# Patient Record
Sex: FEMALE | Race: WHITE | NOT HISPANIC OR LATINO | Employment: OTHER | URBAN - METROPOLITAN AREA
[De-identification: names, ages, dates, MRNs, and addresses within clinical notes are randomized per-mention and may not be internally consistent; named-entity substitution may affect disease eponyms.]

---

## 2017-11-27 ENCOUNTER — CONVERSION ENCOUNTER (OUTPATIENT)
Dept: MAMMOGRAPHY | Facility: HOSPITAL | Age: 42
End: 2017-11-27

## 2018-02-12 ENCOUNTER — OFFICE VISIT CONVERTED (OUTPATIENT)
Dept: FAMILY MEDICINE CLINIC | Facility: CLINIC | Age: 43
End: 2018-02-12
Attending: NURSE PRACTITIONER

## 2018-03-20 ENCOUNTER — OFFICE VISIT CONVERTED (OUTPATIENT)
Dept: FAMILY MEDICINE CLINIC | Facility: CLINIC | Age: 43
End: 2018-03-20
Attending: NURSE PRACTITIONER

## 2018-10-23 ENCOUNTER — OFFICE VISIT CONVERTED (OUTPATIENT)
Dept: FAMILY MEDICINE CLINIC | Facility: CLINIC | Age: 43
End: 2018-10-23
Attending: NURSE PRACTITIONER

## 2018-10-23 ENCOUNTER — CONVERSION ENCOUNTER (OUTPATIENT)
Dept: FAMILY MEDICINE CLINIC | Facility: CLINIC | Age: 43
End: 2018-10-23

## 2018-11-28 ENCOUNTER — CONVERSION ENCOUNTER (OUTPATIENT)
Dept: FAMILY MEDICINE CLINIC | Facility: CLINIC | Age: 43
End: 2018-11-28

## 2018-11-28 ENCOUNTER — OFFICE VISIT CONVERTED (OUTPATIENT)
Dept: FAMILY MEDICINE CLINIC | Facility: CLINIC | Age: 43
End: 2018-11-28
Attending: NURSE PRACTITIONER

## 2019-01-09 ENCOUNTER — CONVERSION ENCOUNTER (OUTPATIENT)
Dept: FAMILY MEDICINE CLINIC | Facility: CLINIC | Age: 44
End: 2019-01-09

## 2019-01-09 ENCOUNTER — OFFICE VISIT CONVERTED (OUTPATIENT)
Dept: FAMILY MEDICINE CLINIC | Facility: CLINIC | Age: 44
End: 2019-01-09
Attending: NURSE PRACTITIONER

## 2019-03-27 ENCOUNTER — OFFICE VISIT CONVERTED (OUTPATIENT)
Dept: FAMILY MEDICINE CLINIC | Facility: CLINIC | Age: 44
End: 2019-03-27
Attending: NURSE PRACTITIONER

## 2019-03-27 ENCOUNTER — CONVERSION ENCOUNTER (OUTPATIENT)
Dept: FAMILY MEDICINE CLINIC | Facility: CLINIC | Age: 44
End: 2019-03-27

## 2019-04-30 ENCOUNTER — OFFICE VISIT CONVERTED (OUTPATIENT)
Dept: FAMILY MEDICINE CLINIC | Facility: CLINIC | Age: 44
End: 2019-04-30
Attending: NURSE PRACTITIONER

## 2019-06-10 ENCOUNTER — OFFICE VISIT CONVERTED (OUTPATIENT)
Dept: FAMILY MEDICINE CLINIC | Facility: CLINIC | Age: 44
End: 2019-06-10
Attending: NURSE PRACTITIONER

## 2019-07-31 ENCOUNTER — OFFICE VISIT CONVERTED (OUTPATIENT)
Dept: FAMILY MEDICINE CLINIC | Facility: CLINIC | Age: 44
End: 2019-07-31
Attending: NURSE PRACTITIONER

## 2019-07-31 ENCOUNTER — HOSPITAL ENCOUNTER (OUTPATIENT)
Dept: ULTRASOUND IMAGING | Facility: HOSPITAL | Age: 44
Discharge: HOME | End: 2019-07-31
Attending: NURSE PRACTITIONER

## 2019-07-31 LAB
ALBUMIN SERPL-MCNC: 4.8 G/DL (ref 3.5–5)
ALBUMIN/GLOB SERPL: 1.5 {RATIO} (ref 1.4–2.6)
ALP SERPL-CCNC: 62 U/L (ref 42–98)
ALT SERPL-CCNC: 13 U/L (ref 10–40)
AMYLASE SERPL-CCNC: 52 U/L (ref 30–110)
ANION GAP SERPL CALC-SCNC: 17 MMOL/L (ref 8–19)
AST SERPL-CCNC: 18 U/L (ref 15–50)
BASOPHILS # BLD AUTO: 0.08 10*3/UL (ref 0–0.2)
BASOPHILS NFR BLD AUTO: 0.9 % (ref 0–3)
BILIRUB SERPL-MCNC: 0.43 MG/DL (ref 0.2–1.3)
BUN SERPL-MCNC: 8 MG/DL (ref 5–25)
BUN/CREAT SERPL: 9 {RATIO} (ref 6–20)
CALCIUM SERPL-MCNC: 9.7 MG/DL (ref 8.7–10.4)
CHLORIDE SERPL-SCNC: 104 MMOL/L (ref 99–111)
CONV ABS IMM GRAN: 0.03 10*3/UL (ref 0–0.2)
CONV CO2: 25 MMOL/L (ref 22–32)
CONV IMMATURE GRAN: 0.3 % (ref 0–1.8)
CONV TOTAL PROTEIN: 7.9 G/DL (ref 6.3–8.2)
CREAT UR-MCNC: 0.91 MG/DL (ref 0.5–0.9)
DEPRECATED RDW RBC AUTO: 47.1 FL (ref 36.4–46.3)
EOSINOPHIL # BLD AUTO: 0.32 10*3/UL (ref 0–0.7)
EOSINOPHIL # BLD AUTO: 3.5 % (ref 0–7)
ERYTHROCYTE [DISTWIDTH] IN BLOOD BY AUTOMATED COUNT: 14.2 % (ref 11.7–14.4)
GFR SERPLBLD BASED ON 1.73 SQ M-ARVRAT: >60 ML/MIN/{1.73_M2}
GLOBULIN UR ELPH-MCNC: 3.1 G/DL (ref 2–3.5)
GLUCOSE SERPL-MCNC: 83 MG/DL (ref 65–99)
HBA1C MFR BLD: 13.7 G/DL (ref 12–16)
HCT VFR BLD AUTO: 40.6 % (ref 37–47)
LIPASE SERPL-CCNC: 23 U/L (ref 5–51)
LYMPHOCYTES # BLD AUTO: 2.79 10*3/UL (ref 1–5)
MCH RBC QN AUTO: 30.4 PG (ref 27–31)
MCHC RBC AUTO-ENTMCNC: 33.7 G/DL (ref 33–37)
MCV RBC AUTO: 90.2 FL (ref 81–99)
MONOCYTES # BLD AUTO: 0.52 10*3/UL (ref 0.2–1.2)
MONOCYTES NFR BLD AUTO: 5.7 % (ref 3–10)
NEUTROPHILS # BLD AUTO: 5.43 10*3/UL (ref 2–8)
NEUTROPHILS NFR BLD AUTO: 59.2 % (ref 30–85)
NRBC CBCN: 0 % (ref 0–0.7)
OSMOLALITY SERPL CALC.SUM OF ELEC: 293 MOSM/KG (ref 273–304)
PLATELET # BLD AUTO: 355 10*3/UL (ref 130–400)
PMV BLD AUTO: 10.3 FL (ref 9.4–12.3)
POTASSIUM SERPL-SCNC: 3.4 MMOL/L (ref 3.5–5.3)
RBC # BLD AUTO: 4.5 10*6/UL (ref 4.2–5.4)
SODIUM SERPL-SCNC: 143 MMOL/L (ref 135–147)
VARIANT LYMPHS NFR BLD MANUAL: 30.4 % (ref 20–45)
WBC # BLD AUTO: 9.17 10*3/UL (ref 4.8–10.8)

## 2019-08-01 LAB
CONV HEPATITIS B SURFACE AG W CONFIRMATION RE: NEGATIVE
HAV IGM SERPL QL IA: NEGATIVE
HBV CORE IGM SERPL QL IA: NEGATIVE
HCV AB SER DONR QL: 0.2 S/CO RATIO (ref 0–0.9)

## 2019-09-18 ENCOUNTER — OFFICE VISIT CONVERTED (OUTPATIENT)
Dept: FAMILY MEDICINE CLINIC | Facility: CLINIC | Age: 44
End: 2019-09-18
Attending: NURSE PRACTITIONER

## 2019-10-25 ENCOUNTER — OFFICE VISIT CONVERTED (OUTPATIENT)
Dept: FAMILY MEDICINE CLINIC | Facility: CLINIC | Age: 44
End: 2019-10-25
Attending: NURSE PRACTITIONER

## 2019-11-27 ENCOUNTER — OFFICE VISIT CONVERTED (OUTPATIENT)
Dept: FAMILY MEDICINE CLINIC | Facility: CLINIC | Age: 44
End: 2019-11-27
Attending: NURSE PRACTITIONER

## 2019-12-04 ENCOUNTER — HOSPITAL ENCOUNTER (OUTPATIENT)
Dept: MAMMOGRAPHY | Facility: HOSPITAL | Age: 44
Discharge: HOME | End: 2019-12-04
Attending: OBSTETRICS & GYNECOLOGY

## 2020-01-10 ENCOUNTER — OFFICE VISIT CONVERTED (OUTPATIENT)
Dept: FAMILY MEDICINE CLINIC | Facility: CLINIC | Age: 45
End: 2020-01-10
Attending: NURSE PRACTITIONER

## 2020-01-16 ENCOUNTER — HOSPITAL ENCOUNTER (OUTPATIENT)
Dept: GENERAL RADIOLOGY | Facility: HOSPITAL | Age: 45
Discharge: HOME | End: 2020-01-16
Attending: NURSE PRACTITIONER

## 2020-03-18 ENCOUNTER — CONVERSION ENCOUNTER (OUTPATIENT)
Dept: FAMILY MEDICINE CLINIC | Facility: CLINIC | Age: 45
End: 2020-03-18

## 2020-03-18 ENCOUNTER — OFFICE VISIT CONVERTED (OUTPATIENT)
Dept: FAMILY MEDICINE CLINIC | Facility: CLINIC | Age: 45
End: 2020-03-18
Attending: NURSE PRACTITIONER

## 2020-03-19 ENCOUNTER — HOSPITAL ENCOUNTER (OUTPATIENT)
Dept: CT IMAGING | Facility: HOSPITAL | Age: 45
Discharge: HOME | End: 2020-03-19
Attending: NURSE PRACTITIONER

## 2020-04-24 ENCOUNTER — TELEMEDICINE CONVERTED (OUTPATIENT)
Dept: FAMILY MEDICINE CLINIC | Facility: CLINIC | Age: 45
End: 2020-04-24
Attending: NURSE PRACTITIONER

## 2020-09-16 ENCOUNTER — OFFICE VISIT CONVERTED (OUTPATIENT)
Dept: FAMILY MEDICINE CLINIC | Facility: CLINIC | Age: 45
End: 2020-09-16
Attending: NURSE PRACTITIONER

## 2020-11-05 ENCOUNTER — OFFICE VISIT CONVERTED (OUTPATIENT)
Dept: FAMILY MEDICINE CLINIC | Facility: CLINIC | Age: 45
End: 2020-11-05
Attending: NURSE PRACTITIONER

## 2020-12-07 ENCOUNTER — OFFICE VISIT CONVERTED (OUTPATIENT)
Dept: GASTROENTEROLOGY | Facility: CLINIC | Age: 45
End: 2020-12-07
Attending: NURSE PRACTITIONER

## 2020-12-14 ENCOUNTER — CONVERSION ENCOUNTER (OUTPATIENT)
Dept: FAMILY MEDICINE CLINIC | Facility: CLINIC | Age: 45
End: 2020-12-14

## 2020-12-14 ENCOUNTER — OFFICE VISIT CONVERTED (OUTPATIENT)
Dept: FAMILY MEDICINE CLINIC | Facility: CLINIC | Age: 45
End: 2020-12-14
Attending: NURSE PRACTITIONER

## 2021-01-15 ENCOUNTER — HOSPITAL ENCOUNTER (OUTPATIENT)
Dept: LAB | Facility: HOSPITAL | Age: 46
Discharge: HOME | End: 2021-01-15
Attending: NURSE PRACTITIONER

## 2021-01-15 ENCOUNTER — OFFICE VISIT CONVERTED (OUTPATIENT)
Dept: FAMILY MEDICINE CLINIC | Facility: CLINIC | Age: 46
End: 2021-01-15
Attending: NURSE PRACTITIONER

## 2021-01-15 ENCOUNTER — CONVERSION ENCOUNTER (OUTPATIENT)
Dept: FAMILY MEDICINE CLINIC | Facility: CLINIC | Age: 46
End: 2021-01-15

## 2021-01-15 LAB
ALBUMIN SERPL-MCNC: 4.4 G/DL (ref 3.5–5)
ALBUMIN/GLOB SERPL: 1.5 {RATIO} (ref 1.4–2.6)
ALP SERPL-CCNC: 60 U/L (ref 42–98)
ALT SERPL-CCNC: 16 U/L (ref 10–40)
ANION GAP SERPL CALC-SCNC: 16 MMOL/L (ref 8–19)
AST SERPL-CCNC: 21 U/L (ref 15–50)
BASOPHILS # BLD AUTO: 0.06 10*3/UL (ref 0–0.2)
BASOPHILS NFR BLD AUTO: 0.8 % (ref 0–3)
BILIRUB SERPL-MCNC: 0.33 MG/DL (ref 0.2–1.3)
BUN SERPL-MCNC: 11 MG/DL (ref 5–25)
BUN/CREAT SERPL: 10 {RATIO} (ref 6–20)
CALCIUM SERPL-MCNC: 9.6 MG/DL (ref 8.7–10.4)
CHLORIDE SERPL-SCNC: 104 MMOL/L (ref 99–111)
CHOLEST SERPL-MCNC: 164 MG/DL (ref 107–200)
CHOLEST/HDLC SERPL: 2.9 {RATIO} (ref 3–6)
CONV ABS IMM GRAN: 0.03 10*3/UL (ref 0–0.2)
CONV CO2: 23 MMOL/L (ref 22–32)
CONV IMMATURE GRAN: 0.4 % (ref 0–1.8)
CONV TOTAL PROTEIN: 7.3 G/DL (ref 6.3–8.2)
CREAT UR-MCNC: 1.05 MG/DL (ref 0.5–0.9)
DEPRECATED RDW RBC AUTO: 43.6 FL (ref 36.4–46.3)
EOSINOPHIL # BLD AUTO: 0.27 10*3/UL (ref 0–0.7)
EOSINOPHIL # BLD AUTO: 3.8 % (ref 0–7)
ERYTHROCYTE [DISTWIDTH] IN BLOOD BY AUTOMATED COUNT: 13.2 % (ref 11.7–14.4)
GFR SERPLBLD BASED ON 1.73 SQ M-ARVRAT: >60 ML/MIN/{1.73_M2}
GLOBULIN UR ELPH-MCNC: 2.9 G/DL (ref 2–3.5)
GLUCOSE SERPL-MCNC: 102 MG/DL (ref 65–99)
HCT VFR BLD AUTO: 40.4 % (ref 37–47)
HDLC SERPL-MCNC: 56 MG/DL (ref 40–60)
HGB BLD-MCNC: 13.3 G/DL (ref 12–16)
LDLC SERPL CALC-MCNC: 90 MG/DL (ref 70–100)
LYMPHOCYTES # BLD AUTO: 2.19 10*3/UL (ref 1–5)
LYMPHOCYTES NFR BLD AUTO: 30.4 % (ref 20–45)
MCH RBC QN AUTO: 29.7 PG (ref 27–31)
MCHC RBC AUTO-ENTMCNC: 32.9 G/DL (ref 33–37)
MCV RBC AUTO: 90.2 FL (ref 81–99)
MONOCYTES # BLD AUTO: 0.54 10*3/UL (ref 0.2–1.2)
MONOCYTES NFR BLD AUTO: 7.5 % (ref 3–10)
NEUTROPHILS # BLD AUTO: 4.11 10*3/UL (ref 2–8)
NEUTROPHILS NFR BLD AUTO: 57.1 % (ref 30–85)
NRBC CBCN: 0 % (ref 0–0.7)
OSMOLALITY SERPL CALC.SUM OF ELEC: 288 MOSM/KG (ref 273–304)
PLATELET # BLD AUTO: 379 10*3/UL (ref 130–400)
PMV BLD AUTO: 10.5 FL (ref 9.4–12.3)
POTASSIUM SERPL-SCNC: 3.5 MMOL/L (ref 3.5–5.3)
RBC # BLD AUTO: 4.48 10*6/UL (ref 4.2–5.4)
SODIUM SERPL-SCNC: 139 MMOL/L (ref 135–147)
TRIGL SERPL-MCNC: 90 MG/DL (ref 40–150)
VLDLC SERPL-MCNC: 18 MG/DL (ref 5–37)
WBC # BLD AUTO: 7.2 10*3/UL (ref 4.8–10.8)

## 2021-02-19 ENCOUNTER — TELEMEDICINE CONVERTED (OUTPATIENT)
Dept: FAMILY MEDICINE CLINIC | Facility: CLINIC | Age: 46
End: 2021-02-19
Attending: NURSE PRACTITIONER

## 2021-04-16 ENCOUNTER — CONVERSION ENCOUNTER (OUTPATIENT)
Dept: FAMILY MEDICINE CLINIC | Facility: CLINIC | Age: 46
End: 2021-04-16

## 2021-04-16 ENCOUNTER — OFFICE VISIT CONVERTED (OUTPATIENT)
Dept: FAMILY MEDICINE CLINIC | Facility: CLINIC | Age: 46
End: 2021-04-16
Attending: NURSE PRACTITIONER

## 2021-04-16 ENCOUNTER — HOSPITAL ENCOUNTER (OUTPATIENT)
Dept: FAMILY MEDICINE CLINIC | Facility: CLINIC | Age: 46
Discharge: HOME | End: 2021-04-16
Attending: NURSE PRACTITIONER

## 2021-04-16 ENCOUNTER — HOSPITAL ENCOUNTER (OUTPATIENT)
Dept: GENERAL RADIOLOGY | Facility: HOSPITAL | Age: 46
Discharge: HOME | End: 2021-04-16
Attending: NURSE PRACTITIONER

## 2021-04-16 LAB
BILIRUB UR QL STRIP: NEGATIVE
COLOR UR: YELLOW
CONV CLARITY OF URINE: NORMAL
CONV PROTEIN IN URINE BY AUTOMATED TEST STRIP: NEGATIVE
CONV UROBILINOGEN IN URINE BY AUTOMATED TEST STRIP: NORMAL
GLUCOSE UR QL: NEGATIVE
HGB UR QL STRIP: NORMAL
KETONES UR QL STRIP: NEGATIVE
LEUKOCYTE ESTERASE UR QL STRIP: NEGATIVE
NITRITE UR QL STRIP: NEGATIVE
PH UR STRIP.AUTO: 6 [PH]
SP GR UR: 1.02

## 2021-04-18 LAB — BACTERIA UR CULT: NORMAL

## 2021-05-10 NOTE — H&P
"   History and Physical      Patient Name: Angelika Shukla   Patient ID: [id number]   Sex: Female   YOB: 1975    Primary Care Provider: Jesse HOWELL   Referring Provider: Jesse HOWELL    Visit Date: December 7, 2020    Provider: LYNDA Madera   Location: [facility]   Location Address: [address] [place] [address]  [id number]   Location Phone: [phone]          Chief Complaint  · \"Abdominal pain\"      History Of Present Illness  The patient is a 45 year old /White female, who presents on referral from Jesse HOWELL, for a gastroenterology evaluation for abdominal pain.   The pain is improved with antibiotics and is worse with nothing. The pain is not reproducible with palpation over the area.   The patient admits to constipation. When she is constipated, the stools are hard and difficult to pass. The patient will have one bowel movement every 5 days and pain does not occur with their bowel movements.      The patient had an ER visit last week for abdominal pain.  CT abdomen/pelvis 12/01/2020 revealed acute diverticulitis in the distal colon, centered at the junction of the distal descending and proximal sigmoid colon.  No colonic obstruction, diverticular abscess, and pneumoperitoneum seen.  She was given a prescription of Cipro and Flagyl.  She reports that she does not feel she is recovering as well as she did earlier this year with a bout of diverticulitis.  This is her second episode of diverticulitis this year.  She reports that meat tends to trigger her diverticulitis.  She reports she had a colonoscopy in 2010 by Dr. Moon in which an ulcer in the rectum was discovered.  She was referred to Dr. Scherer, who treated her for Crohn's.  She was taking Apriso 4 tablets a day for 3 years.  She reports she is not taking any medications for inflammatory bowel disease since then.  She is taking Amitiza 24 mcg twice daily and Linzess 290 mcg " "daily for constipation.  With those medications, she will have a bowel movement 2 times per day.  Without those medications, she will have a bowel movement every 4 to 5 days.  She last saw Dr. Scherer approximately in April via telehealth.  She is taking probiotic and occasionally takes Benefiber.  There is no known family history of colon cancer in her family.  The patient complains of thrush, and the patient has white patches all over her tongue.    Labs 12/09/2020: Hemoglobin 13.8, hematocrit 40.8, platelets 364, ALT 15, AST 18, alk phos 61, total bili 0.24, creatinine 1, GFR greater than 60           Past Medical History  Allergies; Asthma; Bowel Disease; Broken Bones; Crohn's Disease; Diverticulitis; Labral tear of shoulder, degenerative, left, initial encounter; Medial meniscus tear, right, subsequent encounter; Migraines; Pain: Knee; Patellar instability of right knee; Screening Mammogram; Shoulder Impingement; Shoulder pain, left; Stomach Disorder; Tendonitis: Shoulder; Ulcer         Past Surgical History  Colonoscopy; Hysterectomy; Knee repair; Reconstruction of both breasts with insertion of breast implants; shoulder repair         Medication List  Amitiza 24 mcg oral capsule; buspirone 15 mg oral tablet; NEXIUM 40MG CAPS 40 Capsule; Novofine 32 32 gauge x 1/4\" miscellaneous needle; Paxil 20 mg oral tablet; Saxenda 3 mg/0.5 mL (18 mg/3 mL) subcutaneous pen injector; Suprep Bowel Prep Kit 17.5-3.13-1.6 gram oral recon soln; Topamax 50 mg oral tablet         Allergy List  All Mycins; erythromycin; Norco       Allergies Reconciled  Family Medical History  Breast Neoplasm, Malignant; Stroke; Heart Disease; Chronic Obstructive Pulmonary Disease         Social History  Alcohol (Never); Claustophobic (Unknown); .; lives with children; 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); Teacher; 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); Working         Immunizations  Name Date Admin   Influenza Refused   Influenza Refused   Influenza Refused " "  Influenza Refused         Review of Systems  · Constitutional  o Denies  o : chills, fever  · Eyes  o Denies  o : blurred vision, changes in vision  · Cardiovascular  o Denies  o : chest pain  · Respiratory  o Denies  o : shortness of breath  · Gastrointestinal  o Denies  o : nausea, vomiting  · Genitourinary  o Denies  o : dysuria, blood in urine  · Integument  o Denies  o : rash  · Neurologic  o Denies  o : tingling or numbness  · Musculoskeletal  o Denies  o : joint pain  · Endocrine  o Denies  o : weight gain, weight loss  · Psychiatric  o Denies  o : anxiety, depression      Vitals  Date Time BP Position Site L\R Cuff Size HR RR TEMP (F) WT  HT  BMI kg/m2 BSA m2 O2 Sat FR L/min FiO2 HC       12/07/2020 09:41 /83 Sitting    75 - R 16  187lbs 0oz 5'  5\" 31.12 1.97 99 %            Physical Examination  · Constitutional  o Appearance  o : Well-nourished, well developed, alert, in no acute distress, alert and oriented X 3.  · Eyes  o Vision  o :   § Visual Fields  § : move symmetrical in all directions  o Sclerae  o : sclerae anicteric  o Pupils and Irises  o : pupils equal and symetrical  · Neck  o Inspection/Palpation  o : Trachea is midline, no adenopathy  o Thyroid  o : Thyroid is not enlarged  · Respiratory  o Respiratory Effort  o : Breathing is unlabored.  o Inspection of Chest  o : normal appearance, no retractions  o Auscultation of Lungs  o : Chest is clear to auscultation bilaterally  · Cardiovascular  o Heart  o :   § Auscultation of Heart  § : no murmurs, rubs, or gallops  · Gastrointestinal  o Abdominal Examination  o : Abdomen is soft, nontender to palpation, with normal active bowel sounds, no appreciable hepatosplenomegaly.  · Genitourinary  o Digital Rectal Examination  o : Deferred  · Skin and Subcutaneous Tissue  o General Inspection  o : Skin is without focal lesions. Skin turgor is normal.  · Psychiatric  o Mood and Affect  o : Mood and affect are appropriate to " circumstances.          Assessment  · Constipation     564.00/K59.00  · Acute diverticulitis     562.11/K57.92  · Thrush, oral     112.0/B37.0      Plan  · Orders  o Flexible Colonoscopy -Possible risks/complications, benefits, and alternatives to surgical or invasive procedure have been explained to patient and/or legal gaurdian. -Patient has been evaluated and can tolerate anethesia and/or sedation. Risk, benefits, and alternatives to anethesia and/or sedation have been explained to patient and/or legal gaurdian. (95903) - 562.11/K57.92, 564.00/K59.00 - 12/07/2020  · Medications  o fluconazole 150 mg oral tablet   SIG: take 1 tablet (150 mg) by oral route once   DISP: (1) Tablet with 0 refills  Prescribed on 12/07/2020     o Medications have been Reconciled  o Transition of Care or Provider Policy  · Instructions  o 45-year-old female presenting today for the evaluation of acute diverticulitis. She recently had a flare of acute diverticulitis this month. This is her second flare of diverticulitis this year. She also reports that she has constipation and has to take Amitiza 24 mcg twice daily and Linzess 290 mcg daily. I have recommended that she do over-the-counter fiber supplement. She reports she also has a history of Crohn's for which she had previously taking Apriso. The patient does have thrush, so I sent in a prescription of Diflucan 150 mg x 1 dose. I have recommended that she undergo a colonoscopy. The patient is agreeable to the plan.  o Electronically Identified Patient Education Materials Provided Electronically            Electronically Signed by: LYNDA Madera -Author on December 7, 2020 10:36:31 AM

## 2021-05-12 NOTE — PROGRESS NOTES
Progress Note      Patient Name: Angelika Lemus   Patient ID: [id number]   Sex: Female   YOB: 1975    Primary Care Provider: Jesse HOWELL   Referring Provider: Jesse HOWELL    Visit Date: April 24, 2020    Provider: LYNDA Benavidez   Location: [facility]   Location Address: [address] [place] [address], [address]  [id number]   Location Phone: [phone]          Chief Complaint     The patient is being seen by video for an ER f/u diverticulitis flair       History Of Present Illness  Video Conferencing Visit  Angelika Lemus is a 45 year old /White female who is presenting for evaluation via video conferencing. Verbal consent obtained before beginning visit.   The following staff were present during this visit: Layton MONTIEL, LYNDA Benavidez   Angelika Lemus is a 45 year old /White female who presents for evaluation and treatment of:      Patient is doing much better today than she was Monday.  However she still is nauseated quite a bit but her pain is much much better.  And she is sleeping quite a bit more.  She does have a phone call into her gastroenterologist in [place].  However with the COVID he is not been in the office.  Does not plan to be in the office until next Tuesday.  Discussed with her that the Flagyl may be causing her to have the upset stomach.       Past Medical History  Disease Name Date Onset Notes   Allergies --  --    Asthma --  --    Bowel Disease --  --    Broken Bones --  --    Crohn's Disease --  --    Diverticulitis --  --    Labral tear of shoulder, degenerative, left, initial encounter 10/19/2015 --    Medial meniscus tear, right, subsequent encounter 05/17/2016 --    Migraines --  --    Pain: Knee --  --    Patellar instability of right knee 07/29/2016 --    Screening Mammogram 12/04/2019 11/27/17,    Shoulder Impingement 11/19/2014 --    Shoulder pain, left 09/16/2015 --    Stomach Disorder --  --     Tendonitis: Shoulder 09/24/2014 --    Ulcer --  --          Past Surgical History  Procedure Name Date Notes   Colonoscopy 2012 --    Hysterectomy --  --    Knee repair --  --    Reconstruction of both breasts with insertion of breast implants --  --    shoulder repair --  --          Medication List  Name Date Started Instructions   Amitiza 24 mcg oral capsule  take 1 capsule (24 mcg) by oral route 2 times per day with food and water for 30 days   buspirone 15 mg oral tablet  take 1 tablet by oral route 3 times a day   Cipro 500 mg oral tablet 03/18/2020 take 1 tablet (500 mg) by oral route 2 times per day for 10 days   cyclobenzaprine 10 mg oral tablet 08/02/2019 take 0.5-1 tablet by oral route 2 times a day for 30 days   estradiol 1 mg oral tablet 09/18/2019 take 1 tablet (1 mg) by oral route once daily for 30 days   Fioricet -40 mg oral capsule 11/27/2019 take 2 capsules by oral route every 4 hours as needed not to exceed 6 capsules per 24hrs for 30 days   Flagyl 500 mg oral tablet 03/18/2020 take 1 tablet (500 mg) by oral route 2 times per day for 10 days   Flomax 0.4 mg oral capsule 03/19/2020 take 1 capsule (0.4 mg) by oral route once daily 1/2 hour following the same meal each day for 14 days   gabapentin 300 mg oral capsule 01/10/2020 take 1 capsule by oral route daily for 30 days   ketorolac 10 mg oral tablet 03/18/2020 take 1 tablet (10 mg) by oral route every 6 hours as needed not to exceed 40 mg in 24hrs   Linzess 290 mcg oral capsule  take 1 capsule (290 mcg) by oral route once daily on an empty stomach at least 30 minutes before 1st meal of the day for 30 days   Lunesta 3 mg oral tablet  take 1 tablet (3 mg) by oral route once daily at bedtime for 30 days   Myrbetriq 50 mg oral tablet extended release 24 hr 04/08/2020 take 1 tablet (50 mg) by oral route once daily swallowing whole with water. Do not crush, chew and/or divide. for 30 days   Nexium 40 mg oral capsule,delayed release(DR/EC)  "09/18/2019 take 1 capsule (40 mg) by oral route once daily for 30 days   Novofine 32 32 gauge x 1/4\" miscellaneous needle 04/08/2020 use as directed for 30 days   Paxil 20 mg oral tablet  take 1 tablet (20 mg) by oral route once daily   Probiotic 100 billion cell oral capsule  take 1 capsule by oral route daily for 30 days   Saxenda 3 mg/0.5 mL (18 mg/3 mL) subcutaneous pen injector 04/08/2020 inject 3 mg by subcutaneous route once daily in the abdomen, thigh, or upper arm for 30 days   Topamax 50 mg oral tablet 09/18/2019 take 1 tablet (50 mg) by oral route 2 times per day for 30 days         Allergy List  Allergen Name Date Reaction Notes   All Mycins --  --  --    erythromycin --  --  --    [place] 4/2016 HIVES, ITCHING --          Family Medical History  Disease Name Relative/Age Notes   Breast Neoplasm, Malignant Grandmother (maternal)/   --    Stroke Father/  Grandfather (maternal)/  Grandmother (maternal)/   --    Heart Disease Grandmother (maternal)/   --    Chronic Obstructive Pulmonary Disease Mother/   --          Social History  Finding Status Start/Stop Quantity Notes   Alcohol Never --/-- --  --    Alcohol Use Never --/-- --  does not drink   Claustophobic Unknown --/-- --  yes   . 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 Never --/-- --  never smoker   Working --  --/-- --  --          Immunizations  NameDate Admin Mfg Trade Name Lot Number Route Inj VIS Given VIS Publication   InfluenzaRefused 03/18/2020 NE Not Entered  NE NE     Comments:    InfluenzaRefused 04/30/2019 NE Not Entered  NE NE     Comments:          Review of Systems  · Constitutional  o Denies  o : fever, fatigue, weight loss, weight gain  · Cardiovascular  o Denies  o : lower extremity edema, claudication, chest pressure, palpitations  · Respiratory  o Denies  o : shortness of breath, wheezing, cough, hemoptysis, dyspnea on " exertion  · Gastrointestinal  o Denies  o : nausea, vomiting, diarrhea, constipation, abdominal pain      Physical Examination  · Constitutional  o Appearance  o : well-nourished, well developed, alert, in no acute distress  · Eyes  o Conjunctivae  o : conjunctivae normal  o Sclerae  o : sclerae white  o Pupils and Irises  o : pupils equal, round, and reactive to light and accommodation bilaterally  o Eyelids/Ocular Adnexae  o : eyelid appearance normal, no exudates present, eye moisture level normal  · Respiratory  o Respiratory Effort  o : breathing unlabored  o Inspection of Chest  o : normal appearance, no retractions  · Musculoskeletal  o General  o : No joint swelling or deformity noted. Muscle tone, strength and development grossly normal.  · Skin and Subcutaneous Tissue  o General Inspection  o : no rashes or lesions present, no areas of discoloration on face and hands.  · Neurologic  o Mental Status Examination  o : judgement, insight intact, modd and affect appropriate  o Motor Examination  o : strength grossly intact in upper extremities          Assessment  · Diverticulitis     562.11/K57.92       Will hold morning dose of Flagyl on Sunday to see if her nausea and generalized ill feeling gets better.       Plan  · Orders  o ACO-39: Current medications updated and reviewed () - - 04/24/2020  o ACO-14: Influenza immunization was not administered for reasons documented () - - 04/24/2020  · Medications  o Medications have been Reconciled  o Transition of Care or Provider Policy  · Instructions  o Patient was educated/instructed on their diagnosis, treatment and medications prior to discharge from the clinic today.  o Minutes spent with patient including greater than 50% in Education/Counseling/Care Coordination.  o Time spent with the patient was minutes, more than 50% face to face.  · Disposition  o Call or Return if symptoms worsen or persist.            Electronically Signed by: Jesse Mukherjee  APRN -Author on April 24, 2020 03:12:28 PM

## 2021-05-13 NOTE — PROGRESS NOTES
Progress Note      Patient Name: Angelika Lemus   Patient ID: [id number]   Sex: Female   YOB: 1975    Primary Care Provider: Jesse HOWELL   Referring Provider: Jesse HOWELL    Visit Date: November 5, 2020    Provider: LYNDA Benavidez   Location: [facility]   Location Address: [address] [place] [address], [address] [place] [address]  [id number]   Location Phone: [phone]          Chief Complaint     The patient is here for a migraine.       History Of Present Illness  Angelika Lemus is a 45 year old /White female who presents for evaluation and treatment of:      For migraine.  The butalbital is not working any longer.  She has had this headache for 3 days.  But only has been taking her Topamax 50 mg a day versus a twice a day that she should be.       Past Medical History  Disease Name Date Onset Notes   Allergies --  --    Asthma --  --    Bowel Disease --  --    Broken Bones --  --    Crohn's Disease --  --    Diverticulitis --  --    Labral tear of shoulder, degenerative, left, initial encounter 10/19/2015 --    Medial meniscus tear, right, subsequent encounter 05/17/2016 --    Migraines --  --    Pain: Knee --  --    Patellar instability of right knee 07/29/2016 --    Screening Mammogram 12/04/2019 11/27/17,    Shoulder Impingement 11/19/2014 --    Shoulder pain, left 09/16/2015 --    Stomach Disorder --  --    Tendonitis: Shoulder 09/24/2014 --    Ulcer --  --          Past Surgical History  Procedure Name Date Notes   Colonoscopy 2012 --    Hysterectomy --  --    Knee repair --  --    Reconstruction of both breasts with insertion of breast implants --  --    shoulder repair --  --          Medication List  Name Date Started Instructions   Amitiza 24 mcg oral capsule  take 1 capsule (24 mcg) by oral route 2 times per day with food and water for 30 days   buspirone 15 mg oral tablet  take 1 tablet by oral route 3 times a day   Cipro 500 mg oral tablet  "03/18/2020 take 1 tablet (500 mg) by oral route 2 times per day for 10 days   cyclobenzaprine 10 mg oral tablet 08/02/2019 take 0.5-1 tablet by oral route 2 times a day for 30 days   estradiol 1 mg oral tablet 09/18/2019 take 1 tablet (1 mg) by oral route once daily for 30 days   Fioricet -40 mg oral capsule 11/27/2019 take 2 capsules by oral route every 4 hours as needed not to exceed 6 capsules per 24hrs for 30 days   Flagyl 500 mg oral tablet 03/18/2020 take 1 tablet (500 mg) by oral route 2 times per day for 10 days   Flomax 0.4 mg oral capsule 03/19/2020 take 1 capsule (0.4 mg) by oral route once daily 1/2 hour following the same meal each day for 14 days   gabapentin 300 mg oral capsule 01/10/2020 take 1 capsule by oral route daily for 30 days   Linzess 290 mcg oral capsule  take 1 capsule (290 mcg) by oral route once daily on an empty stomach at least 30 minutes before 1st meal of the day for 30 days   lisinopril 5 mg oral tablet 10/12/2020 take 1 tablet (5 mg) by oral route once daily for 30 days   Lunesta 3 mg oral tablet  take 1 tablet (3 mg) by oral route once daily at bedtime for 30 days   Medrol (Gadiel) 4 mg oral tablets,dose pack 09/16/2020 take by oral route as directed per package instructions   Myrbetriq 50 mg oral tablet extended release 24 hr 04/08/2020 take 1 tablet (50 mg) by oral route once daily swallowing whole with water. Do not crush, chew and/or divide. for 30 days   NEXIUM 40MG CAPS 40 Capsule 09/30/2020 TAKE ONE CAPSULE BY MOUTH ONCE DAILY   Novofine 32 32 gauge x 1/4\" miscellaneous needle 04/08/2020 use as directed for 30 days   Paxil 20 mg oral tablet  take 1 tablet (20 mg) by oral route once daily   Probiotic 100 billion cell oral capsule  take 1 capsule by oral route daily for 30 days   Saxenda 3 mg/0.5 mL (18 mg/3 mL) subcutaneous pen injector 04/08/2020 inject 3 mg by subcutaneous route once daily in the abdomen, thigh, or upper arm for 30 days   Topamax 50 mg oral tablet " "09/18/2019 take 1 tablet (50 mg) by oral route 2 times per day for 30 days   tramadol 50 mg oral tablet 09/16/2020 take 1 tablet (50 mg) by oral route every 4-6 hours as needed for 30 days         Allergy List  Allergen Name Date Reaction Notes   All Mycins --  --  --    erythromycin --  --  --    [place] 4/2016 HIVES, ITCHING --          Family Medical History  Disease Name Relative/Age Notes   Breast Neoplasm, Malignant Grandmother (maternal)/   --    Stroke Father/  Grandfather (maternal)/  Grandmother (maternal)/   --    Heart Disease Grandmother (maternal)/   --    Chronic Obstructive Pulmonary Disease Mother/   --          Social History  Finding Status Start/Stop Quantity Notes   Alcohol Never --/-- --  --    Alcohol Use Never --/-- --  does not drink   Claustophobic Unknown --/-- --  yes   . 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 Never --/-- --  never smoker   Working --  --/-- --  --          Immunizations  NameDate Admin Mfg Trade Name Lot Number Route Inj VIS Given VIS Publication   InfluenzaRefused 11/05/2020 NE Not Entered  NE NE     Comments:          Review of Systems  · Constitutional  o Denies  o : fever, fatigue, weight loss, weight gain  · HENT  o Admits  o : headaches  · Cardiovascular  o Denies  o : lower extremity edema, claudication, chest pressure, palpitations  · Respiratory  o Denies  o : shortness of breath, wheezing, cough, hemoptysis, dyspnea on exertion  · Gastrointestinal  o Denies  o : nausea, vomiting, diarrhea, constipation, abdominal pain      Vitals  Date Time BP Position Site L\R Cuff Size HR RR TEMP (F) WT  HT  BMI kg/m2 BSA m2 O2 Sat FR L/min FiO2 HC       11/05/2020 01:51 /70 Sitting    91 - R 16 97 193lbs 16oz 5'  5\" 32.28 2.01 98 %  21%          Physical Examination  · Constitutional  o Appearance  o : well-nourished, well developed, alert, in no acute " distress  · Eyes  o Conjunctivae  o : conjunctivae normal  o Sclerae  o : sclerae white  o Pupils and Irises  o : pupils equal, round, and reactive to light and accommodation bilaterally  o Corneas  o : tear film normal, no lesions present  o Eyelids/Ocular Adnexae  o : eyelid appearance normal, no exudates present, eye moisture level normal  · Respiratory  o Respiratory Effort  o : breathing unlabored  o Inspection of Chest  o : normal appearance, no retractions  o Auscultation of Lungs  o : normal breath sounds throughout  · Cardiovascular  o Heart  o :   § Auscultation of Heart  § : regular rate and rhythm without murmur, PMI normal  o Peripheral Vascular System  o :   § Extremities  § : no cyanosis, clubbing or edema; less than 2 second refill noted  · Musculoskeletal  o General  o : No joint swelling or deformity noted. Muscle tone, strength and development grossly normal.  · Skin and Subcutaneous Tissue  o General Inspection  o : no rashes or lesions present, no areas of discoloration  · Neurologic  o Mental Status Examination  o : judgement, insight intact, modd and affect appropriate  o Motor Examination  o : strength grossly intact in all four extremities  o Gait and Station  o : normal gait, able to stand without difficulty          Assessment  · Headache     784.0/R51       We will try Zyrtec and see if that helps.  Also will send in Imitrex and stop the Fioricet and see if that helps her headaches.       Plan  · Orders  o ACO-39: Current medications updated and reviewed (1159F, ) - - 11/05/2020  o ACO-14: Influenza immunization was not administered for reasons documented [facility] () - - 11/05/2020  · Medications  o Nurtec ODT 75 mg oral tablet,disintegrating   SIG: place 1 tablet (75 mg) under the tongue and allow to dissolve by sublingual route once as needed for migraine; max 1 dose/24 hrs for 30 days   DISP: (8) Tablet with 0 refills  Prescribed on 11/05/2020     o phentermine 37.5 mg oral tablet    SIG: take 1 tablet (37.5 mg) by oral route once daily before breakfast for 30 days   DISP: (30) Tablet with 0 refills  Prescribed on 11/05/2020     o Fioricet -40 mg oral capsule   SIG: take 2 capsules by oral route every 4 hours as needed not to exceed 6 capsules per 24hrs for 30 days   DISP: (60) capsules with 0 refills  Discontinued on 11/05/2020     o Medications have been Reconciled  o Transition of Care or Provider Policy  · Instructions  o Patient was educated/instructed on their diagnosis, treatment and medications prior to discharge from the clinic today.            Electronically Signed by: LYNDA Benavidez -Author on November 5, 2020 02:50:15 PM
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
## 2021-05-13 NOTE — PROGRESS NOTES
Progress Note      Patient Name: Angelika Shukla   Patient ID: [id number]   Sex: Female   YOB: 1975    Primary Care Provider: Jesse HOWELL   Referring Provider: Jesse HOWELL    Visit Date: December 14, 2020    Provider: LYNDA Benavidez   Location: [facility]   Location Address: [address] [place] [address], [address] [place] [address]  [id number]   Location Phone: [phone]          Chief Complaint     The patient is here for a f/u of migraines.       History Of Present Illness  Angelika Shukla is a 45 year old /White female who presents for evaluation and treatment of:      Follow-up migraines.  Zyrtec is working really well for her.  And if she still has a lingering headache after that 1 Fioricet will help.  She is only having about 6 headaches a month now.  So Topamax is also helping with prevention.    Had an episode of diverticulitis.  ER.  Is now doing better with that.    BCG she is lost 10 pounds since last months.  And was to continue on her phentermine.       Past Medical History  Disease Name Date Onset Notes   Allergies --  --    Asthma --  --    Bowel Disease --  --    Broken Bones --  --    Crohn's Disease --  --    Diverticulitis --  --    Labral tear of shoulder, degenerative, left, initial encounter 10/19/2015 --    Medial meniscus tear, right, subsequent encounter 05/17/2016 --    Migraines --  --    Pain: Knee --  --    Patellar instability of right knee 07/29/2016 --    Screening Mammogram 12/04/2019 11/27/17,    Shoulder Impingement 11/19/2014 --    Shoulder pain, left 09/16/2015 --    Stomach Disorder --  --    Tendonitis: Shoulder 09/24/2014 --    Ulcer --  --          Past Surgical History  Procedure Name Date Notes   Colonoscopy 2012 --    Hysterectomy --  --    Knee repair --  --    Reconstruction of both breasts with insertion of breast implants --  --    shoulder repair --  --          Medication List  Name Date Started Instructions  "  Amitiza 24 mcg oral capsule  take 1 capsule (24 mcg) by oral route 2 times per day with food and water for 30 days   buspirone 15 mg oral tablet  take 1 tablet by oral route 3 times a day   butalbital-acetaminophen-caff -40 mg oral tablet  take 1 - 2 tablets by oral route every 4 hours as needed not to exceed 6 tablets per 24hrs for 30 days   fluconazole 150 mg oral tablet 12/07/2020 take 1 tablet (150 mg) by oral route once   NEXIUM 40MG CAPS 40 Capsule 09/30/2020 TAKE ONE CAPSULE BY MOUTH ONCE DAILY   Novofine 32 32 gauge x 1/4\" miscellaneous needle 04/08/2020 use as directed for 30 days   Nurtec ODT 75 mg oral tablet,disintegrating 12/14/2020 take 1 tablet place on top of tongue, allow to dissolve then swallow once as needed for migraine; max 1 dose/24 hrs   Paxil 20 mg oral tablet  take 1 tablet (20 mg) by oral route once daily   Saxenda 3 mg/0.5 mL (18 mg/3 mL) subcutaneous pen injector 04/08/2020 inject 3 mg by subcutaneous route once daily in the abdomen, thigh, or upper arm for 30 days   Suprep Bowel Prep Kit 17.5-3.13-1.6 gram oral recon soln 12/07/2020 take as directed for 1 day   Topamax 50 mg oral tablet 12/14/2020 take 1 tablet (50 mg) by oral route 2 times per day for 30 days         Allergy List  Allergen Name Date Reaction Notes   All Mycins --  --  --    erythromycin --  --  --    [place] 4/2016 HIVES, ITCHING --        Allergies Reconciled  Family Medical History  Disease Name Relative/Age Notes   Breast Neoplasm, Malignant Grandmother (maternal)/   --    Stroke Father/  Grandfather (maternal)/  Grandmother (maternal)/   --    Heart Disease Grandmother (maternal)/   --    Chronic Obstructive Pulmonary Disease Mother/   --          Social History  Finding Status Start/Stop Quantity Notes   Alcohol Never --/-- --  --    Claustophobic Unknown --/-- --  yes   . 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 --  --/-- --  --  " "  Tobacco Never --/-- --  never smoker   Working --  --/-- --  --          Immunizations  NameDate Admin Mfg Trade Name Lot Number Route Inj VIS Given VIS Publication   InfluenzaRefused 11/05/2020 NE Not Entered  NE NE     Comments:          Review of Systems  · Constitutional  o Denies  o : fever, fatigue, weight loss, weight gain  · Cardiovascular  o Denies  o : lower extremity edema, claudication, chest pressure, palpitations  · Respiratory  o Denies  o : shortness of breath, wheezing, cough, hemoptysis, dyspnea on exertion  · Gastrointestinal  o Denies  o : nausea, vomiting, diarrhea, constipation, abdominal pain      Vitals  Date Time BP Position Site L\R Cuff Size HR RR TEMP (F) WT  HT  BMI kg/m2 BSA m2 O2 Sat FR L/min FiO2 HC       12/14/2020 10:05 /80 Sitting    97 - R 16 97 184lbs 0oz 5'  5\" 30.62 1.96 98 %  21%          Physical Examination  · Constitutional  o Appearance  o : well-nourished, well developed, alert, in no acute distress  · Eyes  o Conjunctivae  o : conjunctivae normal  o Sclerae  o : sclerae white  o Pupils and Irises  o : pupils equal, round, and reactive to light and accommodation bilaterally  o Corneas  o : tear film normal, no lesions present  o Eyelids/Ocular Adnexae  o : eyelid appearance normal, no exudates present, eye moisture level normal  · Ears, Nose, Mouth and Throat  o Ears  o : external ear auricle normal, otic canal normal, TM with no reddness, effusion, retraction  o Nose  o : external normal, nasal mucosa normal, turbinates normal  o Oral Cavity  o : tongue no lesion, oral mucosa normal  o Throat  o : no erythemia, exudate or lesions  · Neck  o Inspection/Palpation  o : normal appearance, no masses or tenderness, trachea midline, no enlarged cervical or supraclavicular lymphnodes palpated  o Thyroid  o : gland size normal, nontender, no nodules or masses present on palpation, thyroid motion normal during swallowing  · Respiratory  o Respiratory Effort  o : breathing " unlabored  o Inspection of Chest  o : normal appearance, no retractions  o Auscultation of Lungs  o : normal breath sounds throughout  · Cardiovascular  o Heart  o :   § Auscultation of Heart  § : regular rate and rhythm without murmur, PMI normal  o Peripheral Vascular System  o :   § Carotid Arteries  § : normal pulses bilaterally, no bruits present  § Pedal Pulses  § : pulses 2 bilaterally  § Extremities  § : no cyanosis, clubbing or edema; less than 2 second refill noted  · Musculoskeletal  o General  o : No joint swelling or deformity noted. Muscle tone, strength and development grossly normal.  · Skin and Subcutaneous Tissue  o General Inspection  o : no rashes or lesions present, no areas of discoloration  · Neurologic  o Mental Status Examination  o : judgement, insight intact, modd and affect appropriate  o Motor Examination  o : strength grossly intact in all four extremities  o Gait and Station  o : normal gait, able to stand without difficulty          Results  · In-Office Procedures  o Lab procedure  § IOP - Urine Drug Screen In-House [facility] (88031)   § Amphetamines Ur Ql: Positive   § Barbiturates Ur Ql: Negative   § Buprenorphine+Nor Ur Ql Scn: Negative   § Benzodiaz Ur Ql: Negative   § Cocaine Ur Ql: Negative   § Methadone Ur Ql: Negative   § Methamphet Ur Ql: Negative   § MDMA Ur Ql Scn: Negative   § Opiates Ur Ql: Negative   § Oxycodone Ur Ql: Negative   § PCP Ur Ql: Negative   § THC Ur Ql: Negative   § Temp in Range?: Within/Acceptable   § Control Seen?: Yes       Assessment  · Screening for lipid disorders     V77.91/Z13.220  · Anxiety disorder     300.00/F41.9  · Headache     784.0/R51  · Obesity     278.00/E66.9  · Other long term (current) drug therapy     V58.69/Z79.899  · Diverticulitis     562.11/K57.92  · BMI 35.0-35.9,adult     V85.35/Z68.35      Plan  · Orders  o Lipid Panel [facility] (13457) - V77.91/Z13.220 - 01/14/2021  o CBC with Auto Diff [facility] (26875) - V58.69/Z79.899 - 01/14/2021  o CMP [facility]  (93737) - V58.69/Z79.899 - 01/14/2021  o ACO-39: Current medications updated and reviewed (1159F, ) - - 12/14/2020  o ACO-14: Influenza immunization was not administered for reasons documented [facility] () - - 12/14/2020  · Medications  o phentermine 37.5 mg oral tablet   SIG: take 1 tablet (37.5 mg) by oral route once daily before breakfast for 30 days   DISP: (30) Tablet with 0 refills  Prescribed on 12/14/2020     o Topamax 50 mg oral tablet   SIG: take 1 tablet (50 mg) by oral route 2 times per day for 30 days   DISP: (60) Tablet with 6 refills  Refilled on 12/14/2020     o Medications have been Reconciled  o Transition of Care or Provider Policy  · Instructions  o Patient was educated/instructed on their diagnosis, treatment and medications prior to discharge from the clinic today.  o Flu vaccine declined.            Electronically Signed by: LYNDA Benavidez -Author on December 16, 2020 07:36:34 AM

## 2021-05-14 VITALS
BODY MASS INDEX: 30.16 KG/M2 | TEMPERATURE: 96.6 F | RESPIRATION RATE: 16 BRPM | OXYGEN SATURATION: 98 % | HEIGHT: 65 IN | WEIGHT: 181 LBS | DIASTOLIC BLOOD PRESSURE: 72 MMHG | SYSTOLIC BLOOD PRESSURE: 124 MMHG | HEART RATE: 84 BPM

## 2021-05-14 VITALS
BODY MASS INDEX: 30.66 KG/M2 | WEIGHT: 184 LBS | HEART RATE: 97 BPM | OXYGEN SATURATION: 98 % | DIASTOLIC BLOOD PRESSURE: 80 MMHG | RESPIRATION RATE: 16 BRPM | HEIGHT: 65 IN | SYSTOLIC BLOOD PRESSURE: 110 MMHG | TEMPERATURE: 97 F

## 2021-05-14 VITALS
OXYGEN SATURATION: 100 % | RESPIRATION RATE: 16 BRPM | WEIGHT: 187 LBS | DIASTOLIC BLOOD PRESSURE: 90 MMHG | HEIGHT: 65 IN | HEART RATE: 71 BPM | TEMPERATURE: 97.3 F | BODY MASS INDEX: 31.16 KG/M2 | SYSTOLIC BLOOD PRESSURE: 140 MMHG

## 2021-05-14 VITALS
SYSTOLIC BLOOD PRESSURE: 121 MMHG | BODY MASS INDEX: 30.88 KG/M2 | HEIGHT: 65 IN | TEMPERATURE: 97.4 F | HEART RATE: 70 BPM | WEIGHT: 185.31 LBS | DIASTOLIC BLOOD PRESSURE: 71 MMHG

## 2021-05-14 VITALS
OXYGEN SATURATION: 99 % | WEIGHT: 187 LBS | BODY MASS INDEX: 31.16 KG/M2 | DIASTOLIC BLOOD PRESSURE: 83 MMHG | RESPIRATION RATE: 16 BRPM | HEIGHT: 65 IN | SYSTOLIC BLOOD PRESSURE: 147 MMHG | HEART RATE: 75 BPM

## 2021-05-14 VITALS
SYSTOLIC BLOOD PRESSURE: 124 MMHG | OXYGEN SATURATION: 98 % | HEIGHT: 65 IN | DIASTOLIC BLOOD PRESSURE: 70 MMHG | BODY MASS INDEX: 32.32 KG/M2 | WEIGHT: 194 LBS | RESPIRATION RATE: 16 BRPM | TEMPERATURE: 97 F | HEART RATE: 91 BPM

## 2021-05-14 NOTE — PROGRESS NOTES
"   Progress Note      Patient Name: Angelika Shukla   Patient ID: [id number]   Sex: Female   YOB: 1975    Primary Care Provider: Jesse HOWELL   Referring Provider: Jesse HOWELL    Visit Date: April 16, 2021    Provider: LYNDA Benavidez   Location: [facility]   Location Address: [address] [place] [address], [address] [place] [address]  [id number]   Location Phone: [phone]          Chief Complaint  · chrohns disease flare up with bloating and pain  · pain with urination      History Of Present Illness  Angelika Shukla is a 46 year old /White female who presents for evaluation and treatment of:      Patient with pain with urination having some lower abdominal pain and a flare of Crohn's.  She states that she already had her cocktail of antibiotics at home.  But she will need nystatin to help with the thrush that she will get for me on the antibiotics.      Also had a cough and upper respiratory drainage for a month.   came home and was sick last about 3 days and he got better but she is just never gotten better.    Headaches: She needs a refill of her Topamax.       Past Medical History  Allergies; Asthma; Bowel Disease; Broken Bones; Crohn's Disease; Diverticulitis; Labral tear of shoulder, degenerative, left, initial encounter; Medial meniscus tear, right, subsequent encounter; Migraines; Pain: Knee; Patellar instability of right knee; Screening Mammogram; Shoulder Impingement; Shoulder pain, left; Stomach Disorder; Tendonitis: Shoulder; Ulcer         Past Surgical History  Colonoscopy; Hysterectomy; Knee repair; Reconstruction of both breasts with insertion of breast implants; shoulder repair         Medication List  Amitiza 24 mcg oral capsule; buspirone 15 mg oral tablet; butalbital-acetaminophen-caff -40 mg oral tablet; Imitrex 100 mg oral tablet; NEXIUM 40MG CAPS 40 Capsule; Novofine 32 32 gauge x 1/4\" miscellaneous needle; Paxil 20 mg oral " "tablet; phentermine 37.5 mg oral tablet; Suprep Bowel Prep Kit 17.5-3.13-1.6 gram oral recon soln; Topamax 50 mg oral tablet         Allergy List  All Mycins; erythromycin; Norco       Allergies Reconciled  Family Medical History  Breast Neoplasm, Malignant; Stroke; Heart Disease; Chronic Obstructive Pulmonary Disease         Social History  Alcohol (Never); Claustophobic (Unknown); .; lives with children; 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); Teacher; 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); Working         Immunizations  Name Date Admin   Influenza Refused   Influenza Refused   Influenza Refused   Influenza Refused         Review of Systems  · Constitutional  o Denies  o : fatigue, night sweats  · Eyes  o Denies  o : double vision, blurred vision  · HENT  o Denies  o : vertigo, recent head injury  · Breasts  o Denies  o : abnormal changes in breast size, additional breast symptoms except as noted in the HPI  · Cardiovascular  o Denies  o : chest pain, irregular heart beats  · Respiratory  o Denies  o : shortness of breath, productive cough  · Gastrointestinal  o Denies  o : nausea, vomiting  · Genitourinary  o Denies  o : dysuria, urinary retention  · Integument  o Denies  o : hair growth change, new skin lesions  · Neurologic  o Denies  o : altered mental status, seizures  · Musculoskeletal  o Denies  o : joint swelling, limitation of motion  · Endocrine  o Denies  o : cold intolerance, heat intolerance  · Heme-Lymph  o Denies  o : petechiae, lymph node enlargement or tenderness  · Allergic-Immunologic  o Denies  o : frequent illnesses      Vitals  Date Time BP Position Site L\R Cuff Size HR RR TEMP (F) WT  HT  BMI kg/m2 BSA m2 O2 Sat FR L/min FiO2        04/16/2021 11:21 /71 Sitting    70 - R  97.4 185lbs 5oz 5'  5\" 30.84 1.96             Physical Examination  · Constitutional  o Appearance  o : well-nourished, well developed, alert, in no acute distress  · Eyes  o Conjunctivae  o : conjunctivae normal  o Sclerae  o : sclerae " white  o Pupils and Irises  o : pupils equal, round, and reactive to light and accommodation bilaterally  o Corneas  o : tear film normal, no lesions present  o Eyelids/Ocular Adnexae  o : eyelid appearance normal, no exudates present, eye moisture level normal  · Ears, Nose, Mouth and Throat  o Ears  o : external ear auricle normal, otic canal normal, TM with no reddness, effusion, retraction  o Nose  o : external normal, nasal mucosa normal, turbinates normal  o Oral Cavity  o : tongue no lesion, oral mucosa normal  o Throat  o : no erythemia, exudate or lesions  · Neck  o Inspection/Palpation  o : normal appearance, no masses or tenderness, trachea midline, no enlarged cervical or supraclavicular lymphnodes palpated  o Thyroid  o : gland size normal, nontender, no nodules or masses present on palpation, thyroid motion normal during swallowing  · Respiratory  o Respiratory Effort  o : breathing unlabored  o Inspection of Chest  o : normal appearance, no retractions  o Auscultation of Lungs  o : normal breath sounds throughout  · Cardiovascular  o Heart  o :   § Auscultation of Heart  § : regular rate and rhythm without murmur, PMI normal  o Peripheral Vascular System  o :   § Extremities  § : no cyanosis, clubbing or edema; less than 2 second refill noted  · Gastrointestinal  o Abdominal Examination  o : diffuse abdominal tenderness to palpation present, normal bowel sounds  · Musculoskeletal  o General  o : No joint swelling or deformity noted. Muscle tone, strength and development grossly normal.  · Skin and Subcutaneous Tissue  o General Inspection  o : no rashes or lesions present, no areas of discoloration  · Neurologic  o Mental Status Examination  o : judgement, insight intact, modd and affect appropriate  o Motor Examination  o : strength grossly intact in all four extremities  o Gait and Station  o : normal gait, able to stand without difficulty          Results  · In-Office Procedures  o Lab  procedure  § IOP - Urinalysis without Microscopy (Clinitek) [facility] ([id number])   § Color Ur: Yellow   § Clarity Ur: Slightly cloudy   § Glucose Ur Ql Strip: Negative   § Bilirub Ur Ql Strip: Negative   § Ketones Ur Ql Strip: Negative   § Sp Gr Ur Qn: 1.025   § Hgb Ur Ql Strip: Trace-Intact   § pH Ur-LsCnc: 6.0   § Prot Ur Ql Strip: Negative   § Urobilinogen Ur Strip-mCnc: 0.2 E.U./dL   § Nitrite Ur Ql Strip: Negative   § WBC Est Ur Ql Strip: Negative       Assessment  · Cough     786.2/R05  · Hematuria     599.70/R31.9      Plan  · Orders  o Chest xray 2 views [facility] ([id number]) - 786.2/R05 - 04/16/2021  o Urine culture ([id number], [id number]) - 599.70/R31.9 - 04/16/2021  o ACO-14: Influenza immunization was not administered for reasons documented [facility] () - - 04/16/2021  o ACO-39: Current medications updated and reviewed (, 1159F) - - 04/16/2021  · Medications  o nystatin 100,000 unit/mL oral suspension   SIG: take 5 milliliters (500,000 unit) by oral route 4 times per day for 25 days   DISP: (500) Milliliter with 0 refills  Prescribed on 04/16/2021     o fluconazole 150 mg oral tablet   SIG: take 1 tablet (150 mg) by oral route once   DISP: (1) Tablet with 0 refills  Discontinued on 04/16/2021     o phentermine 37.5 mg oral tablet   SIG: take 1 tablet (37.5 mg) by oral route once daily before breakfast for 30 days   DISP: (30) Tablet with 0 refills  Discontinued on 04/16/2021     o Medications have been Reconciled  o Transition of Care or Provider Policy  · Instructions  o Patient was educated/instructed on their diagnosis, treatment and medications prior to discharge from the clinic today.  o Minutes spent with patient including greater than 50% in Education/Counseling/Care Coordination.  o Time spent with the patient was minutes, more than 50% face to face.  · Disposition  o Call or Return if symptoms worsen or persist.            Electronically Signed by: LYNDA Benavidez -Author on April 16, 2021 12:56:14 PM

## 2021-05-14 NOTE — PROGRESS NOTES
Progress Note      Patient Name: Angelika Shukla   Patient ID: [id number]   Sex: Female   YOB: 1975    Primary Care Provider: Jesse HOWELL   Referring Provider: Jesse HOWELL    Visit Date: January 15, 2021    Provider: LYNDA Benavidez   [place] [facility] [place] Address[place] [address] [place] [address], [address] [place] [address]  [id number]   Location Phone: [phone]          Chief Complaint     The patient is here for a one month f/u of weight management.       History Of Present Illness  Angelika Shukla is a 45 year old /White female who presents for evaluation and treatment of:      weight management.  Has lost 3 pounds in the last month.  Not having chest pain or shortness of breath.  Understands risk of primary pulmonary hypertension.    States has given up soft drinks and sweet tea.  Had eaten a lot through [employer].       Past Medical History  Disease Name Date Onset Notes   Allergies --  --    Asthma --  --    Bowel Disease --  --    Broken Bones --  --    Crohn's Disease --  --    Diverticulitis --  --    Labral tear of shoulder, degenerative, left, initial encounter 10/19/2015 --    Medial meniscus tear, right, subsequent encounter 05/17/2016 --    Migraines --  --    Pain: Knee --  --    Patellar instability of right knee 07/29/2016 --    Screening Mammogram 12/04/2019 11/27/17,    Shoulder Impingement 11/19/2014 --    Shoulder pain, left 09/16/2015 --    Stomach Disorder --  --    Tendonitis: Shoulder 09/24/2014 --    Ulcer --  --          Past Surgical History  Procedure Name Date Notes   Colonoscopy 2012 --    Hysterectomy --  --    Knee repair --  --    Reconstruction of both breasts with insertion of breast implants --  --    shoulder repair --  --          Medication List  Name Date Started Instructions   Amitiza 24 mcg oral capsule  take 1 capsule (24 mcg) by oral route 2 times per day with food and water for 30 days   buspirone 15 mg  "oral tablet  take 1 tablet by oral route 3 times a day   butalbital-acetaminophen-caff -40 mg oral tablet  take 1 - 2 tablets by oral route every 4 hours as needed not to exceed 6 tablets per 24hrs for 30 days   fluconazole 150 mg oral tablet 12/07/2020 take 1 tablet (150 mg) by oral route once   NEXIUM 40MG CAPS 40 Capsule 09/30/2020 TAKE ONE CAPSULE BY MOUTH ONCE DAILY   Novofine 32 32 gauge x 1/4\" miscellaneous needle 04/08/2020 use as directed for 30 days   Nurtec ODT 75 mg oral tablet,disintegrating 12/14/2020 take 1 tablet place on top of tongue, allow to dissolve then swallow once as needed for migraine; max 1 dose/24 hrs   Paxil 20 mg oral tablet  take 1 tablet (20 mg) by oral route once daily   phentermine 37.5 mg oral tablet 01/15/2021 take 1 tablet (37.5 mg) by oral route once daily before breakfast for 30 days   Suprep Bowel Prep Kit 17.5-3.13-1.6 gram oral recon soln 12/07/2020 take as directed for 1 day   Topamax 50 mg oral tablet 12/14/2020 take 1 tablet (50 mg) by oral route 2 times per day for 30 days         Allergy List  Allergen Name Date Reaction Notes   All Mycins --  --  --    erythromycin --  --  --    [place] 4/2016 HIVES, ITCHING --        Allergies Reconciled  Family Medical History  Disease Name Relative/Age Notes   Breast Neoplasm, Malignant Grandmother (maternal)/   --    Stroke Father/  Grandfather (maternal)/  Grandmother (maternal)/   --    Heart Disease Grandmother (maternal)/   --    Chronic Obstructive Pulmonary Disease Mother/   --          Social History  Finding Status Start/Stop Quantity Notes   Alcohol Never --/-- --  --    Claustophobic Unknown --/-- --  yes   . 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 Never --/-- --  never smoker   Working --  --/-- --  --          Immunizations  NameDate Admin Mfg Trade Name Lot Number Route Inj VIS Given VIS Publication " "  InfluenzaRefused 11/05/2020 NE Not Entered  NE NE     Comments:          Review of Systems  · Constitutional  o Admits  o : weight loss  o Denies  o : fever, fatigue, weight gain  · Cardiovascular  o Denies  o : lower extremity edema, claudication, chest pressure, palpitations  · Respiratory  o Denies  o : shortness of breath, wheezing, cough, hemoptysis, dyspnea on exertion  · Gastrointestinal  o Denies  o : nausea, vomiting, diarrhea, constipation, abdominal pain      Vitals  Date Time BP Position Site L\R Cuff Size HR RR TEMP (F) WT  HT  BMI kg/m2 BSA m2 O2 Sat FR L/min FiO2 HC       01/15/2021 10:10 /72 Sitting    84 - R 16 96.6 181lbs 0oz 5'  5\" 30.12 1.94 98 %  21%          Physical Examination  · Constitutional  o Appearance  o : well-nourished, well developed, alert, in no acute distress  · Eyes  o Conjunctivae  o : conjunctivae normal  o Sclerae  o : sclerae white  o Pupils and Irises  o : pupils equal, round, and reactive to light and accommodation bilaterally  o Corneas  o : tear film normal, no lesions present  o Eyelids/Ocular Adnexae  o : eyelid appearance normal, no exudates present, eye moisture level normal  · Respiratory  o Respiratory Effort  o : breathing unlabored  o Inspection of Chest  o : normal appearance, no retractions  o Auscultation of Lungs  o : normal breath sounds throughout  · Cardiovascular  o Heart  o :   § Auscultation of Heart  § : regular rate and rhythm without murmur, PMI normal  o Peripheral Vascular System  o :   § Extremities  § : no cyanosis, clubbing or edema; less than 2 second refill noted  · Musculoskeletal  o General  o : No joint swelling or deformity noted. Muscle tone, strength and development grossly normal.  · Skin and Subcutaneous Tissue  o General Inspection  o : no rashes or lesions present, no areas of discoloration  · Neurologic  o Mental Status Examination  o : judgement, insight intact, modd and affect appropriate  o Motor Examination  o : strength " grossly intact in all four extremities  o Gait and Station  o : normal gait, able to stand without difficulty          Assessment  · Obesity     278.00/E66.9  · BMI 30.0-30.9,adult     V85.30/Z68.30      Plan  · Orders  o ACO-14: Influenza immunization was not administered for reasons documented [facility] () - - 01/15/2021  o ACO-39: Current medications updated and reviewed (1158F, ) - - 01/15/2021  · Medications  o phentermine 37.5 mg oral tablet   SIG: take 1 tablet (37.5 mg) by oral route once daily before breakfast for 30 days   DISP: (30) Tablet with 0 refills  Refilled on 01/15/2021     o Medications have been Reconciled  o Transition of Care or Provider Policy  · Instructions  o Patient was educated/instructed on their diagnosis, treatment and medications prior to discharge from the clinic today.  o Minutes spent with patient including greater than 50% in Education/Counseling/Care Coordination.  o Time spent with the patient was minutes, more than 50% face to face.  · Disposition  o F/U in 1 month            Electronically Signed by: LYNDA Benavidez -Author on January 15, 2021 11:34:17 AM

## 2021-05-14 NOTE — PROGRESS NOTES
Progress Note      Patient Name: Angelika Shukla   Patient ID: [id number]   Sex: Female   YOB: 1975    Primary Care Provider: Jesse HOWELL   Referring Provider: Jesse HOWELL    Visit Date: February 19, 2021    Provider: LYNDA Benavidez   Location: [facility]   Location Address: [address] [place] [address], [address] [place] [address]  [id number]   Location Phone: [phone]          Chief Complaint  · weight management      History Of Present Illness  Angelika Shukla is a 45 year old /White female who presents for evaluation and treatment of:   Video Conferencing Visit  Angelika Shukla is a 45 year old /White female who is presenting for evaluation via video conferencing via Zoom. Verbal consent obtained before beginning visit.   The following staff were present during this visit: Layton MONTIEL, Jesse HOWELL      Patient with 1 month follow-up.  She weighed on her sister scale that was 1-76 yesterday.  Having issues with her phentermine she is not having chest pain or shortness of air.  Would like to continue it.  Difficult getting out due to the snow and the weather.    Headaches: Patient is having issues with insurance wanting her to try something generic.  Nurtec worked well.  But will need to try Imitrex and the Maxalt first before can get that one approved long-term       Past Medical History  Disease Name Date Onset Notes   Allergies --  --    Asthma --  --    Bowel Disease --  --    Broken Bones --  --    Crohn's Disease --  --    Diverticulitis --  --    Labral tear of shoulder, degenerative, left, initial encounter 10/19/2015 --    Medial meniscus tear, right, subsequent encounter 05/17/2016 --    Migraines --  --    Pain: Knee --  --    Patellar instability of right knee 07/29/2016 --    Screening Mammogram 12/04/2019 11/27/17,    Shoulder Impingement 11/19/2014 --    Shoulder pain, left 09/16/2015 --    Stomach Disorder --  --   "  Tendonitis: Shoulder 09/24/2014 --    Ulcer --  --          Past Surgical History  Procedure Name Date Notes   Colonoscopy 2012 --    Hysterectomy --  --    Knee repair --  --    Reconstruction of both breasts with insertion of breast implants --  --    shoulder repair --  --          Medication List  Name Date Started Instructions   Amitiza 24 mcg oral capsule  take 1 capsule (24 mcg) by oral route 2 times per day with food and water for 30 days   buspirone 15 mg oral tablet  take 1 tablet by oral route 3 times a day   butalbital-acetaminophen-caff -40 mg oral tablet  take 1 - 2 tablets by oral route every 4 hours as needed not to exceed 6 tablets per 24hrs for 30 days   fluconazole 150 mg oral tablet 12/07/2020 take 1 tablet (150 mg) by oral route once   NEXIUM 40MG CAPS 40 Capsule 09/30/2020 TAKE ONE CAPSULE BY MOUTH ONCE DAILY   Novofine 32 32 gauge x 1/4\" miscellaneous needle 04/08/2020 use as directed for 30 days   Nurtec ODT 75 mg oral tablet,disintegrating 12/14/2020 take 1 tablet place on top of tongue, allow to dissolve then swallow once as needed for migraine; max 1 dose/24 hrs   Paxil 20 mg oral tablet  take 1 tablet (20 mg) by oral route once daily   phentermine 37.5 mg oral tablet 01/15/2021 take 1 tablet (37.5 mg) by oral route once daily before breakfast for 30 days   Suprep Bowel Prep Kit 17.5-3.13-1.6 gram oral recon soln 12/07/2020 take as directed for 1 day   Topamax 50 mg oral tablet 12/14/2020 take 1 tablet (50 mg) by oral route 2 times per day for 30 days         Allergy List  Allergen Name Date Reaction Notes   All Mycins --  --  --    erythromycin --  --  --    [place] 4/2016 HIVES, ITCHING --          Family Medical History  Disease Name Relative/Age Notes   Breast Neoplasm, Malignant Grandmother (maternal)/   --    Stroke Father/  Grandfather (maternal)/  Grandmother (maternal)/   --    Heart Disease Grandmother (maternal)/   --    Chronic Obstructive Pulmonary Disease Mother/   --  "         Social History  Finding Status Start/Stop Quantity Notes   Alcohol Never --/-- --  --    Claustophobic Unknown --/-- --  yes   . 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 Never --/-- --  never smoker   Working --  --/-- --  --          Immunizations  NameDate Admin Mfg Trade Name Lot Number Route Inj VIS Given VIS Publication   InfluenzaRefused 11/05/2020 NE Not Entered  NE NE     Comments:          Review of Systems  · Constitutional  o Admits  o : weight loss  o Denies  o : fever, fatigue, weight gain  · HENT  o Admits  o : headaches  · Cardiovascular  o Denies  o : lower extremity edema, claudication, chest pressure, palpitations  · Respiratory  o Denies  o : shortness of breath, wheezing, cough, hemoptysis, dyspnea on exertion  · Gastrointestinal  o Denies  o : nausea, vomiting, diarrhea, constipation, abdominal pain      Physical Examination  · Constitutional  o Appearance  o : well-nourished, well developed, alert, in no acute distress  · Eyes  o Conjunctivae  o : conjunctivae normal  o Sclerae  o : sclerae white  o Pupils and Irises  o : pupils equal, round, and reactive to light and accommodation bilaterally  o Corneas  o : tear film normal, no lesions present  o Eyelids/Ocular Adnexae  o : eyelid appearance normal, no exudates present, eye moisture level normal  · Respiratory  o Respiratory Effort  o : breathing unlabored  o Inspection of Chest  o : normal appearance, no retractions  · Musculoskeletal  o General  o : No joint swelling or deformity noted. Muscle tone, strength and development grossly normal.  · Skin and Subcutaneous Tissue  o General Inspection  o : no rashes or lesions present, no areas of discoloration to face  · Neurologic  o Mental Status Examination  o : judgement, insight intact, modd and affect appropriate  o Gait and Station  o : normal gait, able to stand without  difficulty          Assessment  · Visit for screening mammogram     V76.12/Z12.31  · Headache     784.0/R51  · Obesity     278.00/E66.9  · BMI 29.0-29.9,adult     V85.25/Z68.29      Plan  · Orders  o Screening Mammography; Bilateral 3D (92456, , 41230) - V76.12/Z12.31 - 02/19/2021  o [facility] Report ([facility]) - - 02/19/2021  o ACO-14: Influenza immunization was not administered for reasons documented [facility] () - - 02/19/2021  o ACO-39: Current medications updated and reviewed (, 1159F) - - 02/19/2021  · Medications  o Imitrex 100 mg oral tablet   SIG: take 1 tablet by oral route 1X repeatin 2 hours if needed no more than 2 in 24   DISP: (9) Tablet with 5 refills  Prescribed on 02/19/2021     o phentermine 37.5 mg oral tablet   SIG: take 1 tablet (37.5 mg) by oral route once daily before breakfast for 30 days   DISP: (30) Tablet with 0 refills  Refilled on 02/19/2021     o Medications have been Reconciled  o Transition of Care or Provider Policy  · Instructions  o Patient was educated/instructed on their diagnosis, treatment and medications prior to discharge from the clinic today.  o Minutes spent with patient including greater than 50% in Education/Counseling/Care Coordination.  o Time spent with the patient was minutes, more than 50% face to face.  · Disposition  o F/U in 1 month            Electronically Signed by: LYNDA Benavidez -Author on February 19, 2021 11:34:49 AM

## 2021-05-15 VITALS
HEART RATE: 66 BPM | RESPIRATION RATE: 16 BRPM | HEIGHT: 65 IN | DIASTOLIC BLOOD PRESSURE: 75 MMHG | WEIGHT: 168.31 LBS | SYSTOLIC BLOOD PRESSURE: 118 MMHG | OXYGEN SATURATION: 99 % | TEMPERATURE: 97 F | BODY MASS INDEX: 28.04 KG/M2

## 2021-05-15 VITALS
DIASTOLIC BLOOD PRESSURE: 80 MMHG | HEART RATE: 67 BPM | OXYGEN SATURATION: 98 % | RESPIRATION RATE: 16 BRPM | BODY MASS INDEX: 29.49 KG/M2 | WEIGHT: 177 LBS | TEMPERATURE: 97.5 F | HEIGHT: 65 IN | SYSTOLIC BLOOD PRESSURE: 124 MMHG

## 2021-05-15 VITALS
TEMPERATURE: 97.1 F | HEIGHT: 65 IN | WEIGHT: 167 LBS | SYSTOLIC BLOOD PRESSURE: 125 MMHG | OXYGEN SATURATION: 100 % | DIASTOLIC BLOOD PRESSURE: 72 MMHG | RESPIRATION RATE: 16 BRPM | HEART RATE: 81 BPM | BODY MASS INDEX: 27.82 KG/M2

## 2021-05-15 VITALS
OXYGEN SATURATION: 98 % | HEIGHT: 65 IN | SYSTOLIC BLOOD PRESSURE: 124 MMHG | HEART RATE: 75 BPM | RESPIRATION RATE: 16 BRPM | TEMPERATURE: 97.1 F | BODY MASS INDEX: 28.49 KG/M2 | DIASTOLIC BLOOD PRESSURE: 88 MMHG | WEIGHT: 171 LBS

## 2021-05-15 VITALS
TEMPERATURE: 97.8 F | OXYGEN SATURATION: 98 % | HEIGHT: 65 IN | SYSTOLIC BLOOD PRESSURE: 135 MMHG | HEART RATE: 79 BPM | WEIGHT: 165 LBS | BODY MASS INDEX: 27.49 KG/M2 | DIASTOLIC BLOOD PRESSURE: 84 MMHG | RESPIRATION RATE: 16 BRPM

## 2021-05-15 VITALS
HEIGHT: 65 IN | SYSTOLIC BLOOD PRESSURE: 122 MMHG | BODY MASS INDEX: 29.32 KG/M2 | RESPIRATION RATE: 16 BRPM | DIASTOLIC BLOOD PRESSURE: 81 MMHG | TEMPERATURE: 97.7 F | WEIGHT: 176 LBS | OXYGEN SATURATION: 99 % | HEART RATE: 68 BPM

## 2021-05-15 VITALS
BODY MASS INDEX: 29.16 KG/M2 | HEIGHT: 65 IN | TEMPERATURE: 97.6 F | SYSTOLIC BLOOD PRESSURE: 125 MMHG | DIASTOLIC BLOOD PRESSURE: 89 MMHG | WEIGHT: 175 LBS | HEART RATE: 58 BPM | RESPIRATION RATE: 18 BRPM | OXYGEN SATURATION: 100 %

## 2021-05-15 VITALS
BODY MASS INDEX: 28.66 KG/M2 | SYSTOLIC BLOOD PRESSURE: 133 MMHG | WEIGHT: 172 LBS | OXYGEN SATURATION: 100 % | HEIGHT: 65 IN | HEART RATE: 82 BPM | TEMPERATURE: 97.2 F | DIASTOLIC BLOOD PRESSURE: 89 MMHG | RESPIRATION RATE: 16 BRPM

## 2021-05-15 VITALS
SYSTOLIC BLOOD PRESSURE: 119 MMHG | OXYGEN SATURATION: 100 % | BODY MASS INDEX: 30.66 KG/M2 | DIASTOLIC BLOOD PRESSURE: 70 MMHG | HEIGHT: 65 IN | RESPIRATION RATE: 16 BRPM | WEIGHT: 184 LBS | TEMPERATURE: 96.5 F | HEART RATE: 96 BPM

## 2021-05-16 VITALS
HEART RATE: 84 BPM | BODY MASS INDEX: 30.51 KG/M2 | TEMPERATURE: 97.4 F | OXYGEN SATURATION: 96 % | WEIGHT: 183.12 LBS | HEIGHT: 65 IN | RESPIRATION RATE: 16 BRPM | DIASTOLIC BLOOD PRESSURE: 72 MMHG | SYSTOLIC BLOOD PRESSURE: 112 MMHG

## 2021-05-16 VITALS
TEMPERATURE: 97.6 F | BODY MASS INDEX: 30.16 KG/M2 | OXYGEN SATURATION: 100 % | DIASTOLIC BLOOD PRESSURE: 82 MMHG | WEIGHT: 181 LBS | HEIGHT: 65 IN | RESPIRATION RATE: 16 BRPM | HEART RATE: 84 BPM | SYSTOLIC BLOOD PRESSURE: 123 MMHG

## 2021-05-16 VITALS
HEART RATE: 81 BPM | OXYGEN SATURATION: 98 % | WEIGHT: 183 LBS | SYSTOLIC BLOOD PRESSURE: 118 MMHG | TEMPERATURE: 96.8 F | RESPIRATION RATE: 16 BRPM | HEIGHT: 65 IN | BODY MASS INDEX: 30.49 KG/M2 | DIASTOLIC BLOOD PRESSURE: 80 MMHG

## 2021-05-16 VITALS
WEIGHT: 190.37 LBS | SYSTOLIC BLOOD PRESSURE: 119 MMHG | BODY MASS INDEX: 31.72 KG/M2 | TEMPERATURE: 97.6 F | RESPIRATION RATE: 16 BRPM | HEART RATE: 88 BPM | HEIGHT: 65 IN | DIASTOLIC BLOOD PRESSURE: 80 MMHG | OXYGEN SATURATION: 98 %

## 2021-05-16 VITALS
OXYGEN SATURATION: 99 % | DIASTOLIC BLOOD PRESSURE: 83 MMHG | BODY MASS INDEX: 30.82 KG/M2 | WEIGHT: 185 LBS | TEMPERATURE: 96.8 F | SYSTOLIC BLOOD PRESSURE: 134 MMHG | HEART RATE: 74 BPM | HEIGHT: 65 IN | RESPIRATION RATE: 16 BRPM

## 2021-05-22 ENCOUNTER — TRANSCRIBE ORDERS (OUTPATIENT)
Dept: ADMINISTRATIVE | Facility: HOSPITAL | Age: 46
End: 2021-05-22

## 2021-05-22 DIAGNOSIS — Z12.39 SCREENING BREAST EXAMINATION: Primary | ICD-10-CM

## 2021-06-11 ENCOUNTER — APPOINTMENT (OUTPATIENT)
Dept: MAMMOGRAPHY | Facility: HOSPITAL | Age: 46
End: 2021-06-11

## 2021-06-18 ENCOUNTER — APPOINTMENT (OUTPATIENT)
Dept: MAMMOGRAPHY | Facility: HOSPITAL | Age: 46
End: 2021-06-18

## 2021-07-06 ENCOUNTER — HOSPITAL ENCOUNTER (OUTPATIENT)
Dept: MAMMOGRAPHY | Facility: HOSPITAL | Age: 46
Discharge: HOME | End: 2021-07-06
Admitting: NURSE PRACTITIONER

## 2021-07-06 DIAGNOSIS — Z12.39 SCREENING BREAST EXAMINATION: ICD-10-CM

## 2021-07-06 DIAGNOSIS — Z12.39 ENCOUNTER FOR SCREENING FOR MALIGNANT NEOPLASM OF BREAST, UNSPECIFIED SCREENING MODALITY: Primary | ICD-10-CM

## 2021-07-06 PROCEDURE — 77067 SCR MAMMO BI INCL CAD: CPT

## 2021-07-06 PROCEDURE — 77067 SCR MAMMO BI INCL CAD: CPT | Performed by: RADIOLOGY

## 2021-07-06 PROCEDURE — 77063 BREAST TOMOSYNTHESIS BI: CPT

## 2021-07-06 PROCEDURE — 77063 BREAST TOMOSYNTHESIS BI: CPT | Performed by: RADIOLOGY

## 2021-07-09 ENCOUNTER — HOSPITAL ENCOUNTER (OUTPATIENT)
Dept: GENERAL RADIOLOGY | Facility: HOSPITAL | Age: 46
Discharge: HOME | End: 2021-07-09

## 2021-07-09 ENCOUNTER — OFFICE VISIT (OUTPATIENT)
Dept: FAMILY MEDICINE CLINIC | Facility: CLINIC | Age: 46
End: 2021-07-09

## 2021-07-09 VITALS
TEMPERATURE: 96.8 F | SYSTOLIC BLOOD PRESSURE: 113 MMHG | BODY MASS INDEX: 30.91 KG/M2 | DIASTOLIC BLOOD PRESSURE: 70 MMHG | WEIGHT: 192.3 LBS | OXYGEN SATURATION: 98 % | HEIGHT: 66 IN | HEART RATE: 65 BPM | RESPIRATION RATE: 18 BRPM

## 2021-07-09 DIAGNOSIS — N39.0 FREQUENT UTI: ICD-10-CM

## 2021-07-09 DIAGNOSIS — M79.671 FOOT PAIN, RIGHT: ICD-10-CM

## 2021-07-09 DIAGNOSIS — M79.671 FOOT PAIN, RIGHT: Primary | ICD-10-CM

## 2021-07-09 DIAGNOSIS — E66.01 MORBID (SEVERE) OBESITY DUE TO EXCESS CALORIES (HCC): ICD-10-CM

## 2021-07-09 DIAGNOSIS — M25.521 RIGHT ELBOW PAIN: ICD-10-CM

## 2021-07-09 DIAGNOSIS — F41.9 ANXIETY: ICD-10-CM

## 2021-07-09 DIAGNOSIS — G43.809 OTHER MIGRAINE WITHOUT STATUS MIGRAINOSUS, NOT INTRACTABLE: ICD-10-CM

## 2021-07-09 PROBLEM — T14.8XXA BROKEN BONES: Status: ACTIVE | Noted: 2021-07-09

## 2021-07-09 PROBLEM — K31.9 STOMACH DISORDER: Status: ACTIVE | Noted: 2021-07-09

## 2021-07-09 PROBLEM — IMO0002 ULCERATIVE LESION: Status: ACTIVE | Noted: 2021-07-09

## 2021-07-09 PROBLEM — K57.92 DIVERTICULITIS: Status: ACTIVE | Noted: 2021-07-09

## 2021-07-09 PROBLEM — J45.909 ASTHMA: Status: ACTIVE | Noted: 2021-07-09

## 2021-07-09 PROBLEM — K63.9 BOWEL DISEASE: Status: ACTIVE | Noted: 2021-07-09

## 2021-07-09 PROBLEM — M25.661 STIFFNESS OF RIGHT KNEE: Status: ACTIVE | Noted: 2018-04-10

## 2021-07-09 PROBLEM — K50.90 CROHN'S DISEASE: Status: ACTIVE | Noted: 2021-07-09

## 2021-07-09 PROBLEM — G43.909 MIGRAINES: Status: ACTIVE | Noted: 2021-07-09

## 2021-07-09 PROBLEM — M25.569 PAIN IN JOINT, LOWER LEG: Status: ACTIVE | Noted: 2021-07-09

## 2021-07-09 PROCEDURE — 73070 X-RAY EXAM OF ELBOW: CPT

## 2021-07-09 PROCEDURE — 99213 OFFICE O/P EST LOW 20 MIN: CPT | Performed by: NURSE PRACTITIONER

## 2021-07-09 PROCEDURE — 73630 X-RAY EXAM OF FOOT: CPT

## 2021-07-09 RX ORDER — ESZOPICLONE 3 MG/1
TABLET, FILM COATED ORAL
COMMUNITY
End: 2022-05-26

## 2021-07-09 RX ORDER — RIMEGEPANT SULFATE 75 MG/75MG
75 TABLET, ORALLY DISINTEGRATING ORAL DAILY
Qty: 30 TABLET | Refills: 5 | Status: SHIPPED | OUTPATIENT
Start: 2021-07-09 | End: 2021-07-12 | Stop reason: SDUPTHER

## 2021-07-09 RX ORDER — RIZATRIPTAN BENZOATE 10 MG/1
10 TABLET ORAL ONCE AS NEEDED
COMMUNITY
End: 2021-07-09

## 2021-07-09 RX ORDER — PAROXETINE HYDROCHLORIDE 20 MG/1
20 TABLET, FILM COATED ORAL EVERY MORNING
COMMUNITY
End: 2021-08-13 | Stop reason: SDUPTHER

## 2021-07-09 RX ORDER — ESOMEPRAZOLE MAGNESIUM 40 MG/1
40 CAPSULE, DELAYED RELEASE ORAL DAILY
COMMUNITY
End: 2021-12-02

## 2021-07-09 RX ORDER — TOPIRAMATE 50 MG/1
TABLET, FILM COATED ORAL
COMMUNITY
End: 2021-11-05

## 2021-07-09 RX ORDER — ESTRADIOL 0.1 MG/G
2 CREAM VAGINAL DAILY
Qty: 42.5 G | Refills: 12 | Status: SHIPPED | OUTPATIENT
Start: 2021-07-09 | End: 2022-01-06

## 2021-07-09 RX ORDER — PHENTERMINE HYDROCHLORIDE 37.5 MG/1
37.5 CAPSULE ORAL EVERY MORNING
Qty: 30 CAPSULE | Refills: 0 | Status: SHIPPED | OUTPATIENT
Start: 2021-07-09 | End: 2021-08-13

## 2021-07-09 RX ORDER — BACILLUS COAGULANS/INULIN 1B-250 MG
CAPSULE ORAL
COMMUNITY

## 2021-07-09 RX ORDER — AMITRIPTYLINE HYDROCHLORIDE 100 MG/1
TABLET, FILM COATED ORAL
COMMUNITY
End: 2022-01-06

## 2021-07-09 RX ORDER — BUTALBITAL, ACETAMINOPHEN AND CAFFEINE 50; 325; 40 MG/1; MG/1; MG/1
TABLET ORAL
COMMUNITY
End: 2021-11-17 | Stop reason: SDUPTHER

## 2021-07-09 RX ORDER — LUBIPROSTONE 24 UG/1
CAPSULE ORAL
COMMUNITY

## 2021-07-09 RX ORDER — BUSPIRONE HYDROCHLORIDE 15 MG/1
TABLET ORAL
COMMUNITY
End: 2022-04-04

## 2021-07-09 NOTE — PROGRESS NOTES
Chief Complaint  No chief complaint on file.    Subjective        Angelika Shukla presents to [facility]  Anxiety:  Notes that she still deals with anxiety in large crowds.    Right foot pain: has been walking at the [facility].  Hurts with walking  And had a fracture of left foot in 1991.  Right elbow is also painful    Obesity:  Has gained 20 pounds in the last 6 months.          Past History:    Medical History: has a past medical history of Allergies, Anxiety (2014), Asthma, Bowel disease, Broken bones, Crohn disease (CMS/[facility]), Diverticulitis, Diverticulosis (2010), Inflammatory bowel disease (2010), Irritable bowel syndrome (2010), Knee pain, Labral tear of shoulder, left, initial encounter (10/19/2015), Medial meniscus tear (05/17/2016), Migraines, Other abnormal glucose (ulcer), Patellar instability of right knee (07/29/2016), Peptic ulceration (2005), Shoulder impingement (11/19/2014), Shoulder pain, left (09/16/2015), Shoulder tendonitis (09/24/2014), and Stomach disorder.     Surgical History: has a past surgical history that includes Colonoscopy (2012); Hysterectomy; Knee arthroscopy, medial patello femoral ligament repair; Breast reconstruction (Bilateral); and Shoulder AC Joint Repair.     Family History: family history includes Breast cancer in her maternal grandmother; COPD in her mother; Heart disease in her maternal grandmother; Stroke in her father, maternal grandfather, and maternal grandmother.     Social History: reports that she has never smoked. She has never used smokeless tobacco. She reports that she does not drink alcohol and does not use drugs.    Allergies: Hydrocodone, Erythromycin, and Hydrocodone-acetaminophen          Current Outpatient Medications:   •  amitriptyline (ELAVIL) 100 MG tablet, amitriptyline 100 mg oral tablet take 0.5 tablet by oral route daily for 30 days   Suspended, Disp: , Rfl:   •  Bacillus Coagulans-Inulin (Probiotic)  1-250 BILLION-MG capsule, Probiotic 100 billion cell oral capsule take 1 capsule by oral route daily for 30 days   Suspended, Disp: , Rfl:   •  busPIRone (BUSPAR) 15 MG tablet, buspirone 15 mg oral tablet take 1 tablet by oral route 3 times a day   Active, Disp: , Rfl:   •  butalbital-acetaminophen-caffeine (FIORICET, ESGIC) -40 MG per tablet, butalbital-acetaminophen-caff -40 mg oral tablet take 1 - 2 tablets by oral route every 4 hours as needed not to exceed 6 tablets per 24hrs for 30 days   Active, Disp: , Rfl:   •  esomeprazole (nexIUM) 40 MG capsule, Take 40 mg by mouth Daily., Disp: , Rfl:   •  eszopiclone (Lunesta) 3 MG tablet, Lunesta 3 mg oral tablet take 1 tablet (3 mg) by oral route once daily at bedtime for 30 days   Suspended, Disp: , Rfl:   •  linaclotide (Linzess) 290 MCG capsule capsule, Linzess 290 mcg oral capsule take 1 capsule (290 mcg) by oral route once daily on an empty stomach at least 30 minutes before 1st meal of the day for 30 days   Suspended, Disp: , Rfl:   •  lubiprostone (Amitiza) 24 MCG capsule, Amitiza 24 mcg oral capsule take 1 capsule (24 mcg) by oral route 2 times per day with food and water for 30 days   Active, Disp: , Rfl:   •  Mirabegron ER (MYRBETRIQ) 50 MG tablet sustained-release 24 hour 24 hr tablet, Take 50 mg by mouth Daily., Disp: , Rfl:   •  PARoxetine (PAXIL) 20 MG tablet, Take 20 mg by mouth Every Morning., Disp: , Rfl:   •  topiramate (Topamax) 50 MG tablet, Topamax 50 mg oral tablet take 1 tablet (50 mg) by oral route 2 times per day   Suspended, Disp: , Rfl:   •  estradiol (ESTRACE VAGINAL) 0.1 MG/GM vaginal cream, Insert 2 g into the vagina Daily., Disp: 42.5 g, Rfl: 12  •  phentermine 37.5 MG capsule, Take 1 capsule by mouth Every Morning., Disp: 30 capsule, Rfl: 0  •  Rimegepant Sulfate (Nurtec) 75 MG tablet dispersible tablet, Take 1 tablet by mouth Daily As Needed (as needed for headache)., Disp: 8 tablet, Rfl: 5    Medications Discontinued  "During This Encounter   Medication Reason   • rizatriptan (MAXALT) 10 MG tablet *Therapy completed         Review of Systems   Constitutional: Negative for fever.   Respiratory: Negative for cough and shortness of breath.    Cardiovascular: Negative for chest pain, palpitations and leg swelling.   Neurological: Negative for dizziness, weakness, numbness and headache.        Objective         Vitals:    07/09/21 1025   BP: 113/70   BP Location: Left arm   Patient Position: Sitting   Cuff Size: Adult   Pulse: 65   Resp: 18   Temp: 96.8 °F (36 °C)   TempSrc: Infrared   SpO2: 98%   Weight: 87.2 kg (192 lb 4.8 oz)   Height: 167.6 cm (66\")     Body mass index is 31.04 kg/m².         Physical Exam  Vitals reviewed.   Constitutional:       Appearance: Normal appearance. She is well-developed.   HENT:      Head: Normocephalic and atraumatic.      Mouth/Throat:      Pharynx: No oropharyngeal exudate.   Eyes:      Conjunctiva/sclera: Conjunctivae normal.      Pupils: Pupils are equal, round, and reactive to light.   Cardiovascular:      Rate and Rhythm: Normal rate and regular rhythm.      Heart sounds: No murmur heard.   No friction rub. No gallop.    Pulmonary:      Effort: Pulmonary effort is normal.      Breath sounds: Normal breath sounds. No wheezing or rhonchi.   Skin:     General: Skin is warm and dry.   Neurological:      Mental Status: She is alert and oriented to person, place, and time.   Psychiatric:         Mood and Affect: Mood and affect normal.         Behavior: Behavior normal.         Thought Content: Thought content normal.         Judgment: Judgment normal.             Result Review :               Assessment and Plan     Diagnoses and all orders for this visit:    1. Foot pain, right (Primary)  -     XR Foot 3+ View Right; Future    2. Anxiety    3. Other migraine without status migrainosus, not intractable  -     Discontinue: Rimegepant Sulfate (Nurtec) 75 MG tablet dispersible tablet; Take 1 tablet by " mouth Daily for 30 days.  Dispense: 30 tablet; Refill: 5    4. Frequent UTI  Comments:  will try estradiol cream to see if helps diminish frequency of Uti.  Orders:  -     estradiol (ESTRACE VAGINAL) 0.1 MG/GM vaginal cream; Insert 2 g into the vagina Daily.  Dispense: 42.5 g; Refill: 12    5. Right elbow pain  -     XR Elbow 2 View Right; Future    6. Morbid (severe) obesity due to excess calories (CMS/[facility])  Comments:  REstart phentermine after 2 months and see if has success   Orders:  -     phentermine 37.5 MG capsule; Take 1 capsule by mouth Every Morning.  Dispense: 30 capsule; Refill: 0    7. BMI 31.0-31.9,adult              Follow Up     Return in about 1 month (around 8/9/2021).    Patient was given instructions and counseling regarding her condition or for health maintenance advice. Please see specific information pulled into the AVS if appropriate.

## 2021-07-12 DIAGNOSIS — G43.809 OTHER MIGRAINE WITHOUT STATUS MIGRAINOSUS, NOT INTRACTABLE: ICD-10-CM

## 2021-07-12 RX ORDER — RIMEGEPANT SULFATE 75 MG/75MG
75 TABLET, ORALLY DISINTEGRATING ORAL DAILY PRN
Qty: 8 TABLET | Refills: 5 | Status: SHIPPED | OUTPATIENT
Start: 2021-07-12 | End: 2022-02-07

## 2021-08-13 ENCOUNTER — OFFICE VISIT (OUTPATIENT)
Dept: FAMILY MEDICINE CLINIC | Facility: CLINIC | Age: 46
End: 2021-08-13

## 2021-08-13 VITALS
HEART RATE: 66 BPM | HEIGHT: 65 IN | TEMPERATURE: 97.4 F | DIASTOLIC BLOOD PRESSURE: 60 MMHG | OXYGEN SATURATION: 98 % | SYSTOLIC BLOOD PRESSURE: 120 MMHG | BODY MASS INDEX: 30.66 KG/M2 | WEIGHT: 184 LBS | RESPIRATION RATE: 16 BRPM

## 2021-08-13 DIAGNOSIS — E66.1 CLASS 1 DRUG-INDUCED OBESITY WITH BODY MASS INDEX (BMI) OF 30.0 TO 30.9 IN ADULT, UNSPECIFIED WHETHER SERIOUS COMORBIDITY PRESENT: Primary | ICD-10-CM

## 2021-08-13 PROCEDURE — 99213 OFFICE O/P EST LOW 20 MIN: CPT | Performed by: NURSE PRACTITIONER

## 2021-08-13 RX ORDER — PHENTERMINE HYDROCHLORIDE 37.5 MG/1
37.5 TABLET ORAL EVERY MORNING
Qty: 30 TABLET | Refills: 0 | Status: SHIPPED | OUTPATIENT
Start: 2021-08-13 | End: 2021-09-17 | Stop reason: SDUPTHER

## 2021-08-13 RX ORDER — PHENTERMINE HYDROCHLORIDE 37.5 MG/1
37.5 TABLET ORAL EVERY MORNING
COMMUNITY
Start: 2021-07-09 | End: 2021-08-13 | Stop reason: SDUPTHER

## 2021-08-13 RX ORDER — PAROXETINE HYDROCHLORIDE 40 MG/1
40 TABLET, FILM COATED ORAL NIGHTLY
COMMUNITY
Start: 2021-08-02 | End: 2022-06-24

## 2021-08-13 RX ORDER — PROPRANOLOL HYDROCHLORIDE 10 MG/1
10 TABLET ORAL 3 TIMES DAILY PRN
COMMUNITY
Start: 2021-08-02

## 2021-08-13 RX ORDER — MIRTAZAPINE 15 MG/1
15 TABLET, FILM COATED ORAL
COMMUNITY
Start: 2021-07-16 | End: 2022-05-26

## 2021-08-13 RX ORDER — TRAZODONE HYDROCHLORIDE 100 MG/1
100 TABLET ORAL
COMMUNITY
Start: 2021-06-30 | End: 2021-08-13

## 2021-08-13 NOTE — PROGRESS NOTES
Chief Complaint  Obesity (1 m f/u)    Subjective        Angelika Shukla presents to [facility]  Obesity.  She has lost 8 pounds in the last month.  Denies chest pain and shortness of breath.        Past History:    Medical History: has a past medical history of Allergies, Anxiety (2014), Asthma, Bowel disease, Broken bones, Crohn disease (CMS/HCC), Diverticulitis, Diverticulosis (2010), Inflammatory bowel disease (2010), Irritable bowel syndrome (2010), Knee pain, Labral tear of shoulder, left, initial encounter (10/19/2015), Medial meniscus tear (05/17/2016), Migraines, Other abnormal glucose (ulcer), Patellar instability of right knee (07/29/2016), Peptic ulceration (2005), Shoulder impingement (11/19/2014), Shoulder pain, left (09/16/2015), Shoulder tendonitis (09/24/2014), and Stomach disorder.     Surgical History: has a past surgical history that includes Colonoscopy (2012); Hysterectomy; Knee arthroscopy, medial patello femoral ligament repair; Breast reconstruction (Bilateral); and Shoulder AC Joint Repair.     Family History: family history includes Breast cancer in her maternal grandmother; COPD in her mother; Heart disease in her maternal grandmother; Stroke in her father, maternal grandfather, and maternal grandmother.     Social History: reports that she has never smoked. She has never used smokeless tobacco. She reports that she does not drink alcohol and does not use drugs.    Allergies: Hydrocodone, Erythromycin, and Hydrocodone-acetaminophen          Current Outpatient Medications:   •  amitriptyline (ELAVIL) 100 MG tablet, amitriptyline 100 mg oral tablet take 0.5 tablet by oral route daily for 30 days   Suspended, Disp: , Rfl:   •  Bacillus Coagulans-Inulin (Probiotic) 1-250 BILLION-MG capsule, Probiotic 100 billion cell oral capsule take 1 capsule by oral route daily for 30 days   Suspended, Disp: , Rfl:   •  busPIRone (BUSPAR) 15 MG tablet, buspirone 15 mg  oral tablet take 1 tablet by oral route 3 times a day   Active, Disp: , Rfl:   •  butalbital-acetaminophen-caffeine (FIORICET, ESGIC) -40 MG per tablet, butalbital-acetaminophen-caff -40 mg oral tablet take 1 - 2 tablets by oral route every 4 hours as needed not to exceed 6 tablets per 24hrs for 30 days   Active, Disp: , Rfl:   •  esomeprazole (nexIUM) 40 MG capsule, Take 40 mg by mouth Daily., Disp: , Rfl:   •  estradiol (ESTRACE VAGINAL) 0.1 MG/GM vaginal cream, Insert 2 g into the vagina Daily., Disp: 42.5 g, Rfl: 12  •  eszopiclone (Lunesta) 3 MG tablet, Lunesta 3 mg oral tablet take 1 tablet (3 mg) by oral route once daily at bedtime for 30 days   Suspended, Disp: , Rfl:   •  linaclotide (Linzess) 290 MCG capsule capsule, Linzess 290 mcg oral capsule take 1 capsule (290 mcg) by oral route once daily on an empty stomach at least 30 minutes before 1st meal of the day for 30 days   Suspended, Disp: , Rfl:   •  lubiprostone (Amitiza) 24 MCG capsule, Amitiza 24 mcg oral capsule take 1 capsule (24 mcg) by oral route 2 times per day with food and water for 30 days   Active, Disp: , Rfl:   •  Mirabegron ER (MYRBETRIQ) 50 MG tablet sustained-release 24 hour 24 hr tablet, Take 50 mg by mouth Daily., Disp: , Rfl:   •  mirtazapine (REMERON) 15 MG tablet, Take 15 mg by mouth every night at bedtime., Disp: , Rfl:   •  PARoxetine (PAXIL) 40 MG tablet, Take 40 mg by mouth Every Night., Disp: , Rfl:   •  phentermine (ADIPEX-P) 37.5 MG tablet, Take 37.5 mg by mouth Every Morning., Disp: , Rfl:   •  propranolol (INDERAL) 10 MG tablet, Take 10 mg by mouth 3 (Three) Times a Day As Needed. for anxiety, Disp: , Rfl:   •  Rimegepant Sulfate (Nurtec) 75 MG tablet dispersible tablet, Take 1 tablet by mouth Daily As Needed (as needed for headache)., Disp: 8 tablet, Rfl: 5  •  topiramate (Topamax) 50 MG tablet, Topamax 50 mg oral tablet take 1 tablet (50 mg) by oral route 2 times per day   Suspended, Disp: , Rfl:  "    Medications Discontinued During This Encounter   Medication Reason   • PARoxetine (PAXIL) 20 MG tablet Duplicate order   • traZODone (DESYREL) 100 MG tablet *Therapy completed   • phentermine 37.5 MG capsule *Therapy completed         Review of Systems   Constitutional: Negative for fever.   Respiratory: Negative for cough and shortness of breath.    Cardiovascular: Negative for chest pain, palpitations and leg swelling.   Neurological: Negative for dizziness, weakness, numbness and headache.        Objective         Vitals:    08/13/21 1003   BP: 120/60   BP Location: Right arm   Patient Position: Sitting   Cuff Size: Adult   Pulse: 66   Resp: 16   Temp: 97.4 °F (36.3 °C)   TempSrc: Infrared   SpO2: 98%   Weight: 83.5 kg (184 lb)   Height: 165.1 cm (65\")     Body mass index is 30.62 kg/m².         Physical Exam  Vitals reviewed.   Constitutional:       Appearance: Normal appearance. She is well-developed.   HENT:      Head: Normocephalic and atraumatic.      Mouth/Throat:      Pharynx: No oropharyngeal exudate.   Eyes:      Conjunctiva/sclera: Conjunctivae normal.      Pupils: Pupils are equal, round, and reactive to light.   Cardiovascular:      Rate and Rhythm: Normal rate and regular rhythm.      Heart sounds: No murmur heard.   No friction rub. No gallop.    Pulmonary:      Effort: Pulmonary effort is normal.      Breath sounds: Normal breath sounds. No wheezing or rhonchi.   Skin:     General: Skin is warm and dry.   Neurological:      Mental Status: She is alert and oriented to person, place, and time.   Psychiatric:         Mood and Affect: Mood and affect normal.         Behavior: Behavior normal.         Thought Content: Thought content normal.         Judgment: Judgment normal.             Result Review :               Assessment and Plan     Diagnoses and all orders for this visit:    1. Class 1 drug-induced obesity with body mass index (BMI) of 30.0 to 30.9 in adult, unspecified whether serious " comorbidity present (Primary)  Comments:  continue current dose of phentermine  Orders:  -     phentermine (ADIPEX-P) 37.5 MG tablet; Take 1 tablet by mouth Every Morning.  Dispense: 30 tablet; Refill: 0              Follow Up     Return in about 1 month (around 9/13/2021).    Patient was given instructions and counseling regarding her condition or for health maintenance advice. Please see specific information pulled into the AVS if appropriate.

## 2021-08-13 NOTE — PATIENT INSTRUCTIONS

## 2021-09-17 ENCOUNTER — OFFICE VISIT (OUTPATIENT)
Dept: FAMILY MEDICINE CLINIC | Facility: CLINIC | Age: 46
End: 2021-09-17

## 2021-09-17 VITALS
RESPIRATION RATE: 16 BRPM | DIASTOLIC BLOOD PRESSURE: 73 MMHG | TEMPERATURE: 96.3 F | OXYGEN SATURATION: 96 % | BODY MASS INDEX: 29.99 KG/M2 | SYSTOLIC BLOOD PRESSURE: 107 MMHG | HEIGHT: 65 IN | WEIGHT: 180 LBS | HEART RATE: 82 BPM

## 2021-09-17 DIAGNOSIS — E66.3 OVERWEIGHT (BMI 25.0-29.9): Primary | ICD-10-CM

## 2021-09-17 DIAGNOSIS — G47.00 INSOMNIA, UNSPECIFIED TYPE: ICD-10-CM

## 2021-09-17 PROCEDURE — 99213 OFFICE O/P EST LOW 20 MIN: CPT | Performed by: NURSE PRACTITIONER

## 2021-09-17 RX ORDER — PHENTERMINE HYDROCHLORIDE 37.5 MG/1
37.5 TABLET ORAL EVERY MORNING
Qty: 30 TABLET | Refills: 0 | Status: SHIPPED | OUTPATIENT
Start: 2021-09-17 | End: 2021-10-07 | Stop reason: SDUPTHER

## 2021-09-17 NOTE — PROGRESS NOTES
Chief Complaint  Obesity (f/u)    Subjective        Angelika Shukla presents to [facility]  Obesity and she has lost 4 pounds in the last month.  Denies chest pain or shortness of breath.    Still has some insomnia and anxiety but Is dealing with currently.          Past History:    Medical History: has a past medical history of Allergies, Anxiety (2014), Asthma, Bowel disease, Broken bones, Crohn disease (CMS/[facility]), Diverticulitis, Diverticulosis (2010), Inflammatory bowel disease (2010), Irritable bowel syndrome (2010), Knee pain, Labral tear of shoulder, left, initial encounter (10/19/2015), Medial meniscus tear (05/17/2016), Migraines, Other abnormal glucose (ulcer), Patellar instability of right knee (07/29/2016), Peptic ulceration (2005), Shoulder impingement (11/19/2014), Shoulder pain, left (09/16/2015), Shoulder tendonitis (09/24/2014), and Stomach disorder.     Surgical History: has a past surgical history that includes Colonoscopy (2012); Hysterectomy; Knee arthroscopy, medial patello femoral ligament repair; Breast reconstruction (Bilateral); and Shoulder AC Joint Repair.     Family History: family history includes Breast cancer in her maternal grandmother; COPD in her mother; Heart disease in her maternal grandmother; Stroke in her father, maternal grandfather, and maternal grandmother.     Social History: reports that she has never smoked. She has never used smokeless tobacco. She reports that she does not drink alcohol and does not use drugs.    Allergies: Hydrocodone, Erythromycin, and Hydrocodone-acetaminophen          Current Outpatient Medications:   •  amitriptyline (ELAVIL) 100 MG tablet, amitriptyline 100 mg oral tablet take 0.5 tablet by oral route daily for 30 days   Suspended, Disp: , Rfl:   •  Bacillus Coagulans-Inulin (Probiotic) 1-250 BILLION-MG capsule, Probiotic 100 billion cell oral capsule take 1 capsule by oral route daily for 30 days    Suspended, Disp: , Rfl:   •  busPIRone (BUSPAR) 15 MG tablet, buspirone 15 mg oral tablet take 1 tablet by oral route 3 times a day   Active, Disp: , Rfl:   •  butalbital-acetaminophen-caffeine (FIORICET, ESGIC) -40 MG per tablet, butalbital-acetaminophen-caff -40 mg oral tablet take 1 - 2 tablets by oral route every 4 hours as needed not to exceed 6 tablets per 24hrs for 30 days   Active, Disp: , Rfl:   •  esomeprazole (nexIUM) 40 MG capsule, Take 40 mg by mouth Daily., Disp: , Rfl:   •  estradiol (ESTRACE VAGINAL) 0.1 MG/GM vaginal cream, Insert 2 g into the vagina Daily., Disp: 42.5 g, Rfl: 12  •  eszopiclone (Lunesta) 3 MG tablet, Lunesta 3 mg oral tablet take 1 tablet (3 mg) by oral route once daily at bedtime for 30 days   Suspended, Disp: , Rfl:   •  linaclotide (Linzess) 290 MCG capsule capsule, Linzess 290 mcg oral capsule take 1 capsule (290 mcg) by oral route once daily on an empty stomach at least 30 minutes before 1st meal of the day for 30 days   Suspended, Disp: , Rfl:   •  lubiprostone (Amitiza) 24 MCG capsule, Amitiza 24 mcg oral capsule take 1 capsule (24 mcg) by oral route 2 times per day with food and water for 30 days   Active, Disp: , Rfl:   •  Mirabegron ER (MYRBETRIQ) 50 MG tablet sustained-release 24 hour 24 hr tablet, Take 50 mg by mouth Daily., Disp: , Rfl:   •  mirtazapine (REMERON) 15 MG tablet, Take 15 mg by mouth every night at bedtime., Disp: , Rfl:   •  PARoxetine (PAXIL) 40 MG tablet, Take 40 mg by mouth Every Night., Disp: , Rfl:   •  phentermine (ADIPEX-P) 37.5 MG tablet, Take 1 tablet by mouth Every Morning., Disp: 30 tablet, Rfl: 0  •  propranolol (INDERAL) 10 MG tablet, Take 10 mg by mouth 3 (Three) Times a Day As Needed. for anxiety, Disp: , Rfl:   •  Rimegepant Sulfate (Nurtec) 75 MG tablet dispersible tablet, Take 1 tablet by mouth Daily As Needed (as needed for headache)., Disp: 8 tablet, Rfl: 5  •  topiramate (Topamax) 50 MG tablet, Topamax 50 mg oral tablet  "take 1 tablet (50 mg) by oral route 2 times per day   Suspended, Disp: , Rfl:     Medications Discontinued During This Encounter   Medication Reason   • phentermine (ADIPEX-P) 37.5 MG tablet Reorder         Review of Systems   Constitutional: Negative for fever.   Respiratory: Negative for cough and shortness of breath.    Cardiovascular: Negative for chest pain, palpitations and leg swelling.   Neurological: Negative for dizziness, weakness, numbness and headache.        Objective         Vitals:    09/17/21 1115   BP: 107/73   BP Location: Left arm   Patient Position: Sitting   Cuff Size: Adult   Pulse: 82   Resp: 16   Temp: 96.3 °F (35.7 °C)   TempSrc: Infrared   SpO2: 96%   Weight: 81.6 kg (180 lb)   Height: 165.1 cm (65\")     Body mass index is 29.95 kg/m².         Physical Exam  Vitals reviewed.   Constitutional:       Appearance: Normal appearance. She is well-developed.   HENT:      Head: Normocephalic and atraumatic.      Right Ear: External ear normal.      Left Ear: External ear normal.      Mouth/Throat:      Pharynx: No oropharyngeal exudate.   Eyes:      Conjunctiva/sclera: Conjunctivae normal.      Pupils: Pupils are equal, round, and reactive to light.   Cardiovascular:      Rate and Rhythm: Normal rate and regular rhythm.      Heart sounds: No murmur heard.   No friction rub. No gallop.    Pulmonary:      Effort: Pulmonary effort is normal.      Breath sounds: Normal breath sounds. No wheezing or rhonchi.   Abdominal:      General: Bowel sounds are normal. There is no distension.      Palpations: Abdomen is soft.      Tenderness: There is no abdominal tenderness.   Skin:     General: Skin is warm and dry.   Neurological:      Mental Status: She is alert and oriented to person, place, and time.      Cranial Nerves: No cranial nerve deficit.   Psychiatric:         Mood and Affect: Mood and affect normal.         Behavior: Behavior normal.         Thought Content: Thought content normal.         " Judgment: Judgment normal.             Result Review :               Assessment and Plan     Diagnoses and all orders for this visit:    1. Overweight (BMI 25.0-29.9) (Primary)  -     phentermine (ADIPEX-P) 37.5 MG tablet; Take 1 tablet by mouth Every Morning.  Dispense: 30 tablet; Refill: 0    2. Insomnia, unspecified type              Follow Up     Return in about 1 month (around 10/17/2021) for Next scheduled follow up.    Patient was given instructions and counseling regarding her condition or for health maintenance advice. Please see specific information pulled into the AVS if appropriate.

## 2021-10-07 ENCOUNTER — OFFICE VISIT (OUTPATIENT)
Dept: FAMILY MEDICINE CLINIC | Facility: CLINIC | Age: 46
End: 2021-10-07

## 2021-10-07 VITALS
HEIGHT: 66 IN | HEART RATE: 82 BPM | BODY MASS INDEX: 28.75 KG/M2 | DIASTOLIC BLOOD PRESSURE: 78 MMHG | SYSTOLIC BLOOD PRESSURE: 126 MMHG | TEMPERATURE: 96.6 F | WEIGHT: 178.9 LBS | RESPIRATION RATE: 16 BRPM | OXYGEN SATURATION: 98 %

## 2021-10-07 DIAGNOSIS — E66.3 OVERWEIGHT (BMI 25.0-29.9): ICD-10-CM

## 2021-10-07 PROCEDURE — 99213 OFFICE O/P EST LOW 20 MIN: CPT | Performed by: NURSE PRACTITIONER

## 2021-10-07 RX ORDER — PHENTERMINE HYDROCHLORIDE 37.5 MG/1
37.5 TABLET ORAL EVERY MORNING
Qty: 30 TABLET | Refills: 0 | Status: SHIPPED | OUTPATIENT
Start: 2021-10-07 | End: 2022-01-06

## 2021-10-07 NOTE — PROGRESS NOTES
Chief Complaint  Obesity (F/U)    Subjective        Angelika Shukla presents to [facility]  Here for one month follow up with weight loss.  Has only lost 2 pounds.  Was stressed and ate more than she needs to.  Denies chest pain or shortness of breath.        Past History:    Medical History: has a past medical history of Allergies, Anxiety (2014), Asthma, Bowel disease, Broken bones, Crohn disease (HCC), Diverticulitis, Diverticulosis (2010), Inflammatory bowel disease (2010), Irritable bowel syndrome (2010), Knee pain, Labral tear of shoulder, left, initial encounter (10/19/2015), Medial meniscus tear (05/17/2016), Migraines, Other abnormal glucose (ulcer), Patellar instability of right knee (07/29/2016), Peptic ulceration (2005), Shoulder impingement (11/19/2014), Shoulder pain, left (09/16/2015), Shoulder tendonitis (09/24/2014), and Stomach disorder.     Surgical History: has a past surgical history that includes Colonoscopy (2012); Hysterectomy; Knee arthroscopy, medial patello femoral ligament repair; Breast reconstruction (Bilateral); and Shoulder AC Joint Repair.     Family History: family history includes Breast cancer in her maternal grandmother; COPD in her mother; Heart disease in her maternal grandmother; Stroke in her father, maternal grandfather, and maternal grandmother.     Social History: reports that she has never smoked. She has never used smokeless tobacco. She reports that she does not drink alcohol and does not use drugs.    Allergies: Hydrocodone, Erythromycin, and Hydrocodone-acetaminophen          Current Outpatient Medications:   •  amitriptyline (ELAVIL) 100 MG tablet, amitriptyline 100 mg oral tablet take 0.5 tablet by oral route daily for 30 days   Suspended, Disp: , Rfl:   •  Bacillus Coagulans-Inulin (Probiotic) 1-250 BILLION-MG capsule, Probiotic 100 billion cell oral capsule take 1 capsule by oral route daily for 30 days   Suspended, Disp: ,  Rfl:   •  busPIRone (BUSPAR) 15 MG tablet, buspirone 15 mg oral tablet take 1 tablet by oral route 3 times a day   Active, Disp: , Rfl:   •  butalbital-acetaminophen-caffeine (FIORICET, ESGIC) -40 MG per tablet, butalbital-acetaminophen-caff -40 mg oral tablet take 1 - 2 tablets by oral route every 4 hours as needed not to exceed 6 tablets per 24hrs for 30 days   Active, Disp: , Rfl:   •  esomeprazole (nexIUM) 40 MG capsule, Take 40 mg by mouth Daily., Disp: , Rfl:   •  estradiol (ESTRACE VAGINAL) 0.1 MG/GM vaginal cream, Insert 2 g into the vagina Daily., Disp: 42.5 g, Rfl: 12  •  eszopiclone (Lunesta) 3 MG tablet, Lunesta 3 mg oral tablet take 1 tablet (3 mg) by oral route once daily at bedtime for 30 days   Suspended, Disp: , Rfl:   •  linaclotide (Linzess) 290 MCG capsule capsule, Linzess 290 mcg oral capsule take 1 capsule (290 mcg) by oral route once daily on an empty stomach at least 30 minutes before 1st meal of the day for 30 days   Suspended, Disp: , Rfl:   •  lubiprostone (Amitiza) 24 MCG capsule, Amitiza 24 mcg oral capsule take 1 capsule (24 mcg) by oral route 2 times per day with food and water for 30 days   Active, Disp: , Rfl:   •  Mirabegron ER (MYRBETRIQ) 50 MG tablet sustained-release 24 hour 24 hr tablet, Take 50 mg by mouth Daily., Disp: , Rfl:   •  mirtazapine (REMERON) 15 MG tablet, Take 15 mg by mouth every night at bedtime., Disp: , Rfl:   •  PARoxetine (PAXIL) 40 MG tablet, Take 40 mg by mouth Every Night., Disp: , Rfl:   •  phentermine (ADIPEX-P) 37.5 MG tablet, Take 1 tablet by mouth Every Morning., Disp: 30 tablet, Rfl: 0  •  propranolol (INDERAL) 10 MG tablet, Take 10 mg by mouth 3 (Three) Times a Day As Needed. for anxiety, Disp: , Rfl:   •  Rimegepant Sulfate (Nurtec) 75 MG tablet dispersible tablet, Take 1 tablet by mouth Daily As Needed (as needed for headache)., Disp: 8 tablet, Rfl: 5  •  topiramate (Topamax) 50 MG tablet, Topamax 50 mg oral tablet take 1 tablet (50 mg)  "by oral route 2 times per day   Suspended, Disp: , Rfl:     There are no discontinued medications.      Review of Systems   Constitutional: Negative for fever.   Respiratory: Negative for cough and shortness of breath.    Cardiovascular: Negative for chest pain, palpitations and leg swelling.   Neurological: Negative for dizziness, weakness, numbness and headache.        Objective         Vitals:    10/07/21 0937   BP: 126/78   BP Location: Left arm   Patient Position: Sitting   Cuff Size: Adult   Pulse: 82   Resp: 16   Temp: 96.6 °F (35.9 °C)   TempSrc: Infrared   SpO2: 98%   Weight: 81.1 kg (178 lb 14.4 oz)   Height: 167.6 cm (66\")     Body mass index is 28.88 kg/m².         Physical Exam  Vitals reviewed.   Constitutional:       Appearance: Normal appearance. She is well-developed.   HENT:      Head: Normocephalic and atraumatic.      Mouth/Throat:      Pharynx: No oropharyngeal exudate.   Eyes:      Conjunctiva/sclera: Conjunctivae normal.      Pupils: Pupils are equal, round, and reactive to light.   Cardiovascular:      Rate and Rhythm: Normal rate and regular rhythm.      Heart sounds: No murmur heard.   No friction rub. No gallop.    Pulmonary:      Effort: Pulmonary effort is normal.      Breath sounds: Normal breath sounds. No wheezing or rhonchi.   Skin:     General: Skin is warm and dry.   Neurological:      Mental Status: She is alert and oriented to person, place, and time.   Psychiatric:         Mood and Affect: Mood and affect normal.         Behavior: Behavior normal.         Thought Content: Thought content normal.         Judgment: Judgment normal.             Result Review :               Assessment and Plan     Diagnoses and all orders for this visit:    1. Overweight (BMI 25.0-29.9)  -     phentermine (ADIPEX-P) 37.5 MG tablet; Take 1 tablet by mouth Every Morning.  Dispense: 30 tablet; Refill: 0              Follow Up     Return in about 1 month (around 11/7/2021) for Next scheduled follow " up.    Patient was given instructions and counseling regarding her condition or for health maintenance advice. Please see specific information pulled into the AVS if appropriate.

## 2021-11-05 RX ORDER — TOPIRAMATE 50 MG/1
TABLET, FILM COATED ORAL
Qty: 60 TABLET | Refills: 6 | Status: SHIPPED | OUTPATIENT
Start: 2021-11-05 | End: 2022-12-09

## 2021-11-17 ENCOUNTER — OUTSIDE FACILITY SERVICE (OUTPATIENT)
Dept: FAMILY MEDICINE CLINIC | Facility: CLINIC | Age: 46
End: 2021-11-17

## 2021-11-17 ENCOUNTER — OFFICE VISIT (OUTPATIENT)
Dept: FAMILY MEDICINE CLINIC | Facility: CLINIC | Age: 46
End: 2021-11-17

## 2021-11-17 VITALS
RESPIRATION RATE: 16 BRPM | HEART RATE: 99 BPM | HEIGHT: 66 IN | BODY MASS INDEX: 28.49 KG/M2 | WEIGHT: 177.3 LBS | DIASTOLIC BLOOD PRESSURE: 82 MMHG | SYSTOLIC BLOOD PRESSURE: 124 MMHG | TEMPERATURE: 98.4 F | OXYGEN SATURATION: 98 %

## 2021-11-17 DIAGNOSIS — G43.009 MIGRAINE WITHOUT AURA AND WITHOUT STATUS MIGRAINOSUS, NOT INTRACTABLE: ICD-10-CM

## 2021-11-17 DIAGNOSIS — E66.3 OVERWEIGHT (BMI 25.0-29.9): Primary | ICD-10-CM

## 2021-11-17 PROCEDURE — 99213 OFFICE O/P EST LOW 20 MIN: CPT | Performed by: NURSE PRACTITIONER

## 2021-11-17 RX ORDER — BUTALBITAL, ACETAMINOPHEN AND CAFFEINE 50; 325; 40 MG/1; MG/1; MG/1
1 TABLET ORAL EVERY 6 HOURS PRN
Qty: 60 TABLET | Refills: 0 | Status: SHIPPED | OUTPATIENT
Start: 2021-11-17 | End: 2022-02-10 | Stop reason: SDUPTHER

## 2021-11-17 RX ORDER — BUTALBITAL, ACETAMINOPHEN AND CAFFEINE 50; 325; 40 MG/1; MG/1; MG/1
1 TABLET ORAL EVERY 6 HOURS PRN
Qty: 60 TABLET | Refills: 0 | Status: SHIPPED | OUTPATIENT
Start: 2021-11-17 | End: 2021-11-17

## 2021-11-17 NOTE — PROGRESS NOTES
Chief Complaint  Obesity (f/u)    Subjective        Angelika Shukla presents to [facility]  Obesity:  2nd month has not made her 4 pound goal.  Will send in Wegovy but patient may stay off a month to see how she does.      Headaches:  Her insurance requiring other trials of medication and won't pay for Nurtec.  She has tried Imitrex, maxalt, zomig in the past.  She is currently taking topamax. Nurtec has been working for her without interruption in daily work.        Past History:    Medical History: has a past medical history of Allergies, Anxiety (2014), Asthma, Bowel disease, Broken bones, Crohn disease (HCC), Diverticulitis, Diverticulosis (2010), Inflammatory bowel disease (2010), Irritable bowel syndrome (2010), Knee pain, Labral tear of shoulder, left, initial encounter (10/19/2015), Medial meniscus tear (05/17/2016), Migraines, Other abnormal glucose (ulcer), Patellar instability of right knee (07/29/2016), Peptic ulceration (2005), Shoulder impingement (11/19/2014), Shoulder pain, left (09/16/2015), Shoulder tendonitis (09/24/2014), and Stomach disorder.     Surgical History: has a past surgical history that includes Colonoscopy (2012); Hysterectomy; Knee arthroscopy, medial patello femoral ligament repair; Breast reconstruction (Bilateral); and Shoulder AC Joint Repair.     Family History: family history includes Breast cancer in her maternal grandmother; COPD in her mother; Heart disease in her maternal grandmother; Stroke in her father, maternal grandfather, and maternal grandmother.     Social History: reports that she has never smoked. She has never used smokeless tobacco. She reports that she does not drink alcohol and does not use drugs.    Allergies: Hydrocodone, Erythromycin, and Hydrocodone-acetaminophen          Current Outpatient Medications:   •  amitriptyline (ELAVIL) 100 MG tablet, amitriptyline 100 mg oral tablet take 0.5 tablet by oral route daily for  30 days   Suspended, Disp: , Rfl:   •  Bacillus Coagulans-Inulin (Probiotic) 1-250 BILLION-MG capsule, Probiotic 100 billion cell oral capsule take 1 capsule by oral route daily for 30 days   Suspended, Disp: , Rfl:   •  busPIRone (BUSPAR) 15 MG tablet, buspirone 15 mg oral tablet take 1 tablet by oral route 3 times a day   Active, Disp: , Rfl:   •  butalbital-acetaminophen-caffeine (FIORICET, ESGIC) -40 MG per tablet, butalbital-acetaminophen-caff -40 mg oral tablet take 1 - 2 tablets by oral route every 4 hours as needed not to exceed 6 tablets per 24hrs for 30 days   Active, Disp: , Rfl:   •  esomeprazole (nexIUM) 40 MG capsule, Take 40 mg by mouth Daily., Disp: , Rfl:   •  eszopiclone (Lunesta) 3 MG tablet, Lunesta 3 mg oral tablet take 1 tablet (3 mg) by oral route once daily at bedtime for 30 days   Suspended, Disp: , Rfl:   •  linaclotide (Linzess) 290 MCG capsule capsule, Linzess 290 mcg oral capsule take 1 capsule (290 mcg) by oral route once daily on an empty stomach at least 30 minutes before 1st meal of the day for 30 days   Suspended, Disp: , Rfl:   •  lubiprostone (Amitiza) 24 MCG capsule, Amitiza 24 mcg oral capsule take 1 capsule (24 mcg) by oral route 2 times per day with food and water for 30 days   Active, Disp: , Rfl:   •  Mirabegron ER (MYRBETRIQ) 50 MG tablet sustained-release 24 hour 24 hr tablet, Take 50 mg by mouth Daily., Disp: , Rfl:   •  PARoxetine (PAXIL) 40 MG tablet, Take 40 mg by mouth Every Night., Disp: , Rfl:   •  phentermine (ADIPEX-P) 37.5 MG tablet, Take 1 tablet by mouth Every Morning., Disp: 30 tablet, Rfl: 0  •  propranolol (INDERAL) 10 MG tablet, Take 10 mg by mouth 3 (Three) Times a Day As Needed. for anxiety, Disp: , Rfl:   •  Rimegepant Sulfate (Nurtec) 75 MG tablet dispersible tablet, Take 1 tablet by mouth Daily As Needed (as needed for headache)., Disp: 8 tablet, Rfl: 5  •  topiramate (TOPAMAX) 50 MG tablet, TAKE 1 TABLET BY MOUTH TWICE A DAY, Disp: 60  "tablet, Rfl: 6  •  estradiol (ESTRACE VAGINAL) 0.1 MG/GM vaginal cream, Insert 2 g into the vagina Daily., Disp: 42.5 g, Rfl: 12  •  mirtazapine (REMERON) 15 MG tablet, Take 15 mg by mouth every night at bedtime., Disp: , Rfl:     There are no discontinued medications.      Review of Systems   Constitutional: Negative for fever.   Respiratory: Negative for cough and shortness of breath.    Cardiovascular: Negative for chest pain, palpitations and leg swelling.   Neurological: Negative for dizziness, weakness, numbness and headache.        Objective         Vitals:    11/17/21 1316   BP: 124/82   BP Location: Right arm   Patient Position: Sitting   Cuff Size: Large Adult   Pulse: 99   Resp: 16   Temp: 98.4 °F (36.9 °C)   TempSrc: Infrared   SpO2: 98%   Weight: 80.4 kg (177 lb 4.8 oz)   Height: 167.6 cm (66\")     Body mass index is 28.62 kg/m².         Physical Exam  Vitals reviewed.   Constitutional:       Appearance: Normal appearance. She is well-developed.   HENT:      Head: Normocephalic and atraumatic.      Mouth/Throat:      Pharynx: No oropharyngeal exudate.   Eyes:      Conjunctiva/sclera: Conjunctivae normal.      Pupils: Pupils are equal, round, and reactive to light.   Cardiovascular:      Rate and Rhythm: Normal rate and regular rhythm.      Heart sounds: No murmur heard.  No friction rub. No gallop.    Pulmonary:      Effort: Pulmonary effort is normal.      Breath sounds: Normal breath sounds. No wheezing or rhonchi.   Skin:     General: Skin is warm and dry.   Neurological:      Mental Status: She is alert and oriented to person, place, and time.   Psychiatric:         Mood and Affect: Mood and affect normal.         Behavior: Behavior normal.         Thought Content: Thought content normal.         Judgment: Judgment normal.             Result Review :               Assessment and Plan     Diagnoses and all orders for this visit:    1. Overweight (BMI 25.0-29.9) (Primary)  Comments:  patients original " BMI 31 in July of 2021.    2. Migraine without aura and without status migrainosus, not intractable  Comments:  continue nurtec and will appeal to get for patient if able.              Follow Up     Return in about 1 month (around 12/17/2021).    Patient was given instructions and counseling regarding her condition or for health maintenance advice. Please see specific information pulled into the AVS if appropriate.

## 2021-12-01 ENCOUNTER — PATIENT MESSAGE (OUTPATIENT)
Dept: FAMILY MEDICINE CLINIC | Facility: CLINIC | Age: 46
End: 2021-12-01

## 2021-12-01 DIAGNOSIS — R30.0 DYSURIA: Primary | ICD-10-CM

## 2021-12-01 RX ORDER — NITROFURANTOIN 25; 75 MG/1; MG/1
100 CAPSULE ORAL 2 TIMES DAILY
Qty: 14 CAPSULE | Refills: 0 | Status: SHIPPED | OUTPATIENT
Start: 2021-12-01 | End: 2021-12-02 | Stop reason: SDUPTHER

## 2021-12-01 NOTE — TELEPHONE ENCOUNTER
From: Angelika Shukla  To: LYNDA Benavidez  Sent: 12/1/2021 1:20 PM EST  Subject: Kidney/uti infection     Jesse,  I have a kidney/uti infection. I am hurting and burning when I pee. I'm peeing just a little at a time but feel like I need to go all the time. It started last night and I took a phenazopyridine 100 MG that I had. It helped where I could go without hurting. Can you call me in a antibiotic and some more of these? I know you said you were going to be out some in December. I tried to get an appointment but they said there was none available until January.

## 2021-12-02 ENCOUNTER — LAB (OUTPATIENT)
Dept: LAB | Facility: HOSPITAL | Age: 46
End: 2021-12-02

## 2021-12-02 DIAGNOSIS — R30.0 DYSURIA: ICD-10-CM

## 2021-12-02 LAB
BILIRUB UR QL STRIP: NEGATIVE
CLARITY UR: CLEAR
COLOR UR: YELLOW
GLUCOSE UR STRIP-MCNC: NEGATIVE MG/DL
HGB UR QL STRIP.AUTO: NEGATIVE
KETONES UR QL STRIP: NEGATIVE
LEUKOCYTE ESTERASE UR QL STRIP.AUTO: NEGATIVE
NITRITE UR QL STRIP: NEGATIVE
PH UR STRIP.AUTO: 6.5 [PH] (ref 5–8)
PROT UR QL STRIP: NEGATIVE
SP GR UR STRIP: 1.02 (ref 1–1.03)
UROBILINOGEN UR QL STRIP: NORMAL

## 2021-12-02 PROCEDURE — 81003 URINALYSIS AUTO W/O SCOPE: CPT

## 2021-12-02 RX ORDER — NITROFURANTOIN 25; 75 MG/1; MG/1
100 CAPSULE ORAL 2 TIMES DAILY
Qty: 14 CAPSULE | Refills: 0 | Status: SHIPPED | OUTPATIENT
Start: 2021-12-02 | End: 2022-01-06

## 2021-12-02 RX ORDER — PHENAZOPYRIDINE HYDROCHLORIDE 100 MG/1
100 TABLET, FILM COATED ORAL 3 TIMES DAILY PRN
Qty: 10 TABLET | Refills: 0 | Status: SHIPPED | OUTPATIENT
Start: 2021-12-02 | End: 2022-01-06 | Stop reason: SDUPTHER

## 2021-12-02 NOTE — TELEPHONE ENCOUNTER
Please advise medication   Last visit:1/6/2022  Next visit:11/17/2021    Ellis: Nexium 40mg last refilled on 4/26/2021 30 tabs with 5 refills

## 2022-01-06 ENCOUNTER — OFFICE VISIT (OUTPATIENT)
Dept: FAMILY MEDICINE CLINIC | Facility: CLINIC | Age: 47
End: 2022-01-06

## 2022-01-06 VITALS
HEART RATE: 74 BPM | HEIGHT: 66 IN | BODY MASS INDEX: 30.22 KG/M2 | OXYGEN SATURATION: 98 % | TEMPERATURE: 98 F | SYSTOLIC BLOOD PRESSURE: 110 MMHG | WEIGHT: 188 LBS | DIASTOLIC BLOOD PRESSURE: 75 MMHG

## 2022-01-06 DIAGNOSIS — R10.9 LEFT FLANK PAIN: Primary | ICD-10-CM

## 2022-01-06 DIAGNOSIS — E66.01 MORBID (SEVERE) OBESITY DUE TO EXCESS CALORIES: ICD-10-CM

## 2022-01-06 DIAGNOSIS — R30.0 DYSURIA: ICD-10-CM

## 2022-01-06 DIAGNOSIS — N28.1 RENAL CYST, ACQUIRED, RIGHT: ICD-10-CM

## 2022-01-06 PROBLEM — K57.90 DIVERTICULAR DISEASE: Status: ACTIVE | Noted: 2017-07-13

## 2022-01-06 PROCEDURE — 99213 OFFICE O/P EST LOW 20 MIN: CPT | Performed by: NURSE PRACTITIONER

## 2022-01-06 RX ORDER — LORAZEPAM 0.5 MG/1
TABLET ORAL
COMMUNITY
Start: 2021-12-03 | End: 2022-01-06

## 2022-01-06 RX ORDER — DICYCLOMINE HCL 20 MG
TABLET ORAL
COMMUNITY

## 2022-01-06 RX ORDER — METRONIDAZOLE 500 MG/1
TABLET ORAL
COMMUNITY
Start: 2022-01-05 | End: 2022-01-06

## 2022-01-06 RX ORDER — AMOXICILLIN AND CLAVULANATE POTASSIUM 875; 125 MG/1; MG/1
1 TABLET, FILM COATED ORAL 2 TIMES DAILY
COMMUNITY
Start: 2021-12-21 | End: 2022-01-06

## 2022-01-06 RX ORDER — POLYETHYLENE GLYCOL-3350 AND ELECTROLYTES WITH FLAVOR PACK 240; 5.84; 2.98; 6.72; 22.72 G/278.26G; G/278.26G; G/278.26G; G/278.26G; G/278.26G
POWDER, FOR SOLUTION ORAL
COMMUNITY
Start: 2022-01-05 | End: 2022-01-06

## 2022-01-06 RX ORDER — CIPROFLOXACIN 500 MG/1
TABLET, FILM COATED ORAL
COMMUNITY
Start: 2022-01-05 | End: 2022-01-06

## 2022-01-06 RX ORDER — LORAZEPAM 0.5 MG/1
TABLET ORAL
COMMUNITY
Start: 2021-12-03

## 2022-01-06 RX ORDER — METRONIDAZOLE 500 MG/1
500 TABLET ORAL 3 TIMES DAILY
COMMUNITY
Start: 2022-01-05 | End: 2022-01-19

## 2022-01-06 RX ORDER — METHENAMINE, SODIUM PHOSPHATE, MONOBASIC, ANHYDROUS, PHENYL SALICYLATE, METHYLENE BLUE AND HYOSCYAMINE SULFATE 118; 40.8; 36; 10; .12 MG/1; MG/1; MG/1; MG/1; MG/1
CAPSULE ORAL
COMMUNITY
Start: 2021-12-21

## 2022-01-06 RX ORDER — CIPROFLOXACIN 500 MG/1
500 TABLET, FILM COATED ORAL
COMMUNITY
Start: 2022-01-05 | End: 2022-01-19

## 2022-01-06 RX ORDER — PHENAZOPYRIDINE HYDROCHLORIDE 100 MG/1
100 TABLET, FILM COATED ORAL 3 TIMES DAILY PRN
Qty: 10 TABLET | Refills: 0 | Status: SHIPPED | OUTPATIENT
Start: 2022-01-06 | End: 2022-04-04

## 2022-01-06 NOTE — PATIENT INSTRUCTIONS
Interstitial Cystitis    Interstitial cystitis is inflammation of the bladder. This condition is also known as painful bladder syndrome. This may cause pain in the bladder area as well as a frequent and urgent need to urinate. The bladder is an organ that stores urine after the urine is made in the kidneys.  The severity of interstitial cystitis can vary from person to person. You may have flare-ups, and then your symptoms may go away for a while. For many people, it becomes a long-term (chronic) problem.  What are the causes?  The cause of this condition is not known.  What increases the risk?  The following factors may make you more likely to develop this condition:  · You are female.  · You have fibromyalgia.  · You have irritable bowel syndrome (IBS).  · You have endometriosis.  This condition may be aggravated by:  · Stress.  · Smoking.  · Spicy foods.  What are the signs or symptoms?  Symptoms of interstitial cystitis vary, and they can change over time. Symptoms may include:  · Discomfort or pain in the bladder area, which is in the lower abdomen. Pain can range from mild to severe. The pain may change in intensity as the bladder fills with urine or as it empties.  · Pain in the pelvic area, between the hip bones.  · An urgent need to urinate.  · Frequent urination.  · Pain during urination.  · Pain during sex.  · Blood in the urine.  · Fatigue.  For women, symptoms often get worse during menstruation.  How is this diagnosed?  This condition is diagnosed based on your symptoms, your medical history, and a physical exam. You may have tests to rule out other conditions, such as:  · Urine tests.  · Cystoscopy. For this test, a tool similar to a very thin telescope is used to look into your bladder.  · Biopsy. This involves taking a sample of tissue from the bladder to be examined under a microscope.  How is this treated?  There is no cure for this condition, but treatment can help you control your symptoms. Work  closely with your health care provider to find the most effective treatments for you. Treatment options may include:  · Medicines to relieve pain and reduce how often you feel the need to urinate. This treatment may include:  ? A procedure where a small amount of medicine that eases irritation is put inside your bladder through a catheter (bladder instillation).  · Lifestyle changes, such as changing your diet or taking steps to control stress.  · Physical therapy. This may include:  ? Exercises to help relax the pelvic floor muscles.  ? Massage to relax tight muscles (myofascial release).  · Learning ways to control when you urinate (bladder training).  · Using a device that provides electrical stimulation to your nerves, which can relieve pain (neuromodulation therapy). The device is placed on your back, where it blocks the nerves that cause you to feel pain in your bladder area.  · A procedure that stretches your bladder by filling it with air or fluid (hydrodistention).  · Surgery. This is rare. It is only done for extreme cases, if other treatments do not help.  Follow these instructions at home:  Lifestyle  · Learn and practice relaxation techniques, such as deep breathing and muscle relaxation.  · Get care for your body and mental well-being, such as:  ? Cognitive behavioral therapy (CBT). This therapy changes the way you think or act in response to the fatigue. This may help improve how you feel.  ? Seeing a mental health therapist to evaluate and treat depression, if necessary.  · Work with your health care provider on other ways to manage pain. Acupuncture may be helpful.  · Avoid drinking alcohol.  · Do not use any products that contain nicotine or tobacco, such as cigarettes, e-cigarettes, and chewing tobacco. If you need help quitting, ask your health care provider.  Eating and drinking  · Make dietary changes as recommended by your health care provider. You may need to avoid:  ? Spicy foods.  ? Foods  that contain a lot of potassium.  · Limit your intake of drinks that make you need to urinate. These include:  ? Caffeinated drinks like soda, coffee, and tea.  ? Alcohol.  Bladder training    · Use bladder training techniques as directed. Techniques may include:  ? Urinating at scheduled times.  ? Training yourself to delay urination.  · Keep a bladder diary.  ? Write down the times that you urinate and any symptoms that you have. This can help you find out which foods, liquids, or activities make your symptoms worse.  ? Use your bladder diary to schedule bathroom trips. If you are away from home, plan to be near a bathroom at each of your scheduled times.  · Make sure that you urinate just before you leave the house and just before you go to bed.    General instructions  · Take over-the-counter and prescription medicines only as told by your health care provider.  · You can try a warm or cool compress over your bladder for comfort.  · Avoid wearing tight clothing.  · Do exercises to relax your pelvic floor muscles as told by your physical therapist.  · Keep all follow-up visits as told by your health care provider. This is important.  Where to find more information  To find more information or a support group near you, visit:  · [employer]: urologyhealth.org  · Interstitial Cystitis Association: ichelp.org  Contact a health care provider if you have:  · Symptoms that do not get better with treatment.  · Pain or discomfort that gets worse.  · More frequent urges to urinate.  · A fever.  Get help right away if:  · You have no control over when you urinate.  Summary  · Interstitial cystitis is inflammation of the bladder.  · This condition may cause pain in the bladder area as well as a frequent and urgent need to urinate.  · You may have flare-ups of the condition, and then it may go away for a while. For many people, it becomes a long-term (chronic) problem.  · There is no cure for interstitial  cystitis, but treatment methods are available to control your symptoms.  This information is not intended to replace advice given to you by your health care provider. Make sure you discuss any questions you have with your health care provider.  Document Revised: 02/03/2021 Document Reviewed: 11/12/2018  [employer]vier Patient Education © 2021 [employer]

## 2022-01-06 NOTE — PROGRESS NOTES
Chief Complaint  Obesity (1 month f/u )    Subjective        Angelika Shukla presents to [facility]  Obesity:  Here for 1 month follow up.  Never got her Saxenda filled and has had an acute illness and helped after got steroid       Concerned about kidney stone in kidney due to intermittent pain.        Past History:    Medical History: has a past medical history of Allergies, Anxiety (2014), Asthma, Bowel disease, Broken bones, Crohn disease (HCC), Diverticulitis, Diverticulosis (2010), Inflammatory bowel disease (2010), Irritable bowel syndrome (2010), Knee pain, Labral tear of shoulder, left, initial encounter (10/19/2015), Medial meniscus tear (05/17/2016), Migraines, Other abnormal glucose (ulcer), Patellar instability of right knee (07/29/2016), Peptic ulceration (2005), Shoulder impingement (11/19/2014), Shoulder pain, left (09/16/2015), Shoulder tendonitis (09/24/2014), and Stomach disorder.     Surgical History: has a past surgical history that includes Colonoscopy (2012); Hysterectomy; Knee arthroscopy, medial patello femoral ligament repair; Breast reconstruction (Bilateral); and Shoulder AC Joint Repair.     Family History: family history includes Breast cancer in her maternal grandmother; COPD in her mother; Heart disease in her maternal grandmother; Stroke in her father, maternal grandfather, and maternal grandmother.     Social History: reports that she has never smoked. She has never used smokeless tobacco. She reports that she does not drink alcohol and does not use drugs.    Allergies: Hydrocodone, Cephalosporins, Erythromycin, Hydrocodone-acetaminophen, and Sulfa antibiotics          Current Outpatient Medications:   •  Bacillus Coagulans-Inulin (Probiotic) 1-250 BILLION-MG capsule, Probiotic 100 billion cell oral capsule take 1 capsule by oral route daily for 30 days   Suspended, Disp: , Rfl:   •  busPIRone (BUSPAR) 15 MG tablet, buspirone 15 mg oral tablet take  1 tablet by oral route 3 times a day   Active, Disp: , Rfl:   •  butalbital-acetaminophen-caffeine (FIORICET, ESGIC) -40 MG per tablet, Take 1 tablet by mouth Every 6 (Six) Hours As Needed for Headache., Disp: 60 tablet, Rfl: 0  •  ciprofloxacin (CIPRO) 500 MG tablet, Take 500 mg by mouth., Disp: , Rfl:   •  dicyclomine (BENTYL) 20 MG tablet, Take  by mouth., Disp: , Rfl:   •  eszopiclone (Lunesta) 3 MG tablet, Lunesta 3 mg oral tablet take 1 tablet (3 mg) by oral route once daily at bedtime for 30 days   Suspended, Disp: , Rfl:   •  linaclotide (Linzess) 290 MCG capsule capsule, Linzess 290 mcg oral capsule take 1 capsule (290 mcg) by oral route once daily on an empty stomach at least 30 minutes before 1st meal of the day for 30 days   Suspended, Disp: , Rfl:   •  LORazepam (ATIVAN) 0.5 MG tablet, TAKE ONE TABLET BY MOUTH TWO TIMES A DAY AS NEEDED FOR SEVERE ANXIETY, Disp: , Rfl:   •  lubiprostone (Amitiza) 24 MCG capsule, Amitiza 24 mcg oral capsule take 1 capsule (24 mcg) by oral route 2 times per day with food and water for 30 days   Active, Disp: , Rfl:   •  Meth-Hyo-M Bl-Na Phos-Ph Sal (Uro-MP) 118 MG capsule, TAKE ONE CAPSULE BY MOUTH FOUR TIMES A DAY AS NEEDED FOR 3 DAYS, Disp: , Rfl:   •  metroNIDAZOLE (FLAGYL) 500 MG tablet, Take 500 mg by mouth 3 (Three) Times a Day., Disp: , Rfl:   •  Mirabegron ER (MYRBETRIQ) 50 MG tablet sustained-release 24 hour 24 hr tablet, Take 50 mg by mouth Daily., Disp: , Rfl:   •  mirtazapine (REMERON) 15 MG tablet, Take 15 mg by mouth every night at bedtime., Disp: , Rfl:   •  nexIUM 40 MG capsule, TAKE ONE CAPSULE BY MOUTH ONCE DAILY, Disp: 30 capsule, Rfl: 6  •  PARoxetine (PAXIL) 40 MG tablet, Take 40 mg by mouth Every Night., Disp: , Rfl:   •  phenazopyridine (Pyridium) 100 MG tablet, Take 1 tablet by mouth 3 (Three) Times a Day As Needed for Bladder Spasms., Disp: 10 tablet, Rfl: 0  •  propranolol (INDERAL) 10 MG tablet, Take 10 mg by mouth 3 (Three) Times a Day As  "Needed. for anxiety, Disp: , Rfl:   •  Rimegepant Sulfate (Nurtec) 75 MG tablet dispersible tablet, Take 1 tablet by mouth Daily As Needed (as needed for headache)., Disp: 8 tablet, Rfl: 5  •  topiramate (TOPAMAX) 50 MG tablet, TAKE 1 TABLET BY MOUTH TWICE A DAY, Disp: 60 tablet, Rfl: 6  •  Semaglutide-Weight Management 0.25 MG/0.5ML solution auto-injector, Inject 0.25 mg under the skin into the appropriate area as directed 1 (One) Time Per Week., Disp: 2 mL, Rfl: 2    Medications Discontinued During This Encounter   Medication Reason   • amitriptyline (ELAVIL) 100 MG tablet *Therapy completed   • amoxicillin-clavulanate (AUGMENTIN) 875-125 MG per tablet *Therapy completed   • ciprofloxacin (CIPRO) 500 MG tablet *Therapy completed   • estradiol (ESTRACE VAGINAL) 0.1 MG/GM vaginal cream *Therapy completed   • GaviLyte-C 240 g solution *Therapy completed   • LORazepam (ATIVAN) 0.5 MG tablet *Therapy completed   • metroNIDAZOLE (FLAGYL) 500 MG tablet *Therapy completed   • nitrofurantoin, macrocrystal-monohydrate, (Macrobid) 100 MG capsule *Therapy completed   • phentermine (ADIPEX-P) 37.5 MG tablet *Therapy completed   • phenazopyridine (Pyridium) 100 MG tablet Reorder         Review of Systems   Constitutional: Negative for fever.   Respiratory: Negative for cough and shortness of breath.    Cardiovascular: Negative for chest pain, palpitations and leg swelling.   Neurological: Negative for dizziness, weakness, numbness and headache.        Objective         Vitals:    01/06/22 0926   BP: 110/75   BP Location: Right arm   Patient Position: Sitting   Cuff Size: Adult   Pulse: 74   Temp: 98 °F (36.7 °C)   TempSrc: Temporal   SpO2: 98%   Weight: 85.3 kg (188 lb)   Height: 167.6 cm (65.98\")     Body mass index is 30.36 kg/m².         Physical Exam  Vitals reviewed.   Constitutional:       Appearance: Normal appearance. She is well-developed.   HENT:      Head: Normocephalic and atraumatic.      Mouth/Throat:      " Pharynx: No oropharyngeal exudate.   Eyes:      Conjunctiva/sclera: Conjunctivae normal.      Pupils: Pupils are equal, round, and reactive to light.   Cardiovascular:      Rate and Rhythm: Normal rate and regular rhythm.      Heart sounds: No murmur heard.  No friction rub. No gallop.    Pulmonary:      Effort: Pulmonary effort is normal.      Breath sounds: Normal breath sounds. No wheezing or rhonchi.   Skin:     General: Skin is warm and dry.   Neurological:      Mental Status: She is alert and oriented to person, place, and time.   Psychiatric:         Mood and Affect: Mood and affect normal.         Behavior: Behavior normal.         Thought Content: Thought content normal.         Judgment: Judgment normal.             Result Review :               Assessment and Plan     Diagnoses and all orders for this visit:    1. Left flank pain (Primary)  -     US Renal Bilateral; Future    2. Renal cyst, acquired, right  -     US Renal Bilateral; Future    3. Dysuria  -     phenazopyridine (Pyridium) 100 MG tablet; Take 1 tablet by mouth 3 (Three) Times a Day As Needed for Bladder Spasms.  Dispense: 10 tablet; Refill: 0  -     Cancel: Urinalysis With Culture If Indicated -; Future  -     Urinalysis With Culture If Indicated -; Future    4. Morbid (severe) obesity due to excess calories (HCC)              Follow Up     Return in about 1 month (around 2/6/2022).    Patient was given instructions and counseling regarding her condition or for health maintenance advice. Please see specific information pulled into the AVS if appropriate.

## 2022-01-10 ENCOUNTER — TELEPHONE (OUTPATIENT)
Dept: FAMILY MEDICINE CLINIC | Facility: CLINIC | Age: 47
End: 2022-01-10

## 2022-02-07 DIAGNOSIS — G43.809 OTHER MIGRAINE WITHOUT STATUS MIGRAINOSUS, NOT INTRACTABLE: ICD-10-CM

## 2022-02-07 RX ORDER — RIMEGEPANT SULFATE 75 MG/75MG
TABLET, ORALLY DISINTEGRATING ORAL
Qty: 8 TABLET | Refills: 5 | Status: SHIPPED | OUTPATIENT
Start: 2022-02-07

## 2022-02-10 ENCOUNTER — OFFICE VISIT (OUTPATIENT)
Dept: FAMILY MEDICINE CLINIC | Facility: CLINIC | Age: 47
End: 2022-02-10

## 2022-02-10 VITALS
TEMPERATURE: 97.9 F | DIASTOLIC BLOOD PRESSURE: 89 MMHG | HEART RATE: 67 BPM | OXYGEN SATURATION: 99 % | WEIGHT: 186.2 LBS | SYSTOLIC BLOOD PRESSURE: 121 MMHG | HEIGHT: 66 IN | BODY MASS INDEX: 29.92 KG/M2

## 2022-02-10 DIAGNOSIS — G43.009 MIGRAINE WITHOUT AURA AND WITHOUT STATUS MIGRAINOSUS, NOT INTRACTABLE: ICD-10-CM

## 2022-02-10 DIAGNOSIS — E66.1 CLASS 1 DRUG-INDUCED OBESITY WITH BODY MASS INDEX (BMI) OF 30.0 TO 30.9 IN ADULT, UNSPECIFIED WHETHER SERIOUS COMORBIDITY PRESENT: Primary | ICD-10-CM

## 2022-02-10 PROCEDURE — 99214 OFFICE O/P EST MOD 30 MIN: CPT | Performed by: NURSE PRACTITIONER

## 2022-02-10 RX ORDER — BUTALBITAL, ACETAMINOPHEN AND CAFFEINE 50; 325; 40 MG/1; MG/1; MG/1
1 TABLET ORAL EVERY 6 HOURS PRN
Qty: 60 TABLET | Refills: 1 | Status: SHIPPED | OUTPATIENT
Start: 2022-02-10 | End: 2023-01-13 | Stop reason: SDUPTHER

## 2022-02-10 RX ORDER — BUSPIRONE HYDROCHLORIDE 30 MG/1
TABLET ORAL
COMMUNITY
Start: 2022-02-09

## 2022-02-10 RX ORDER — MELOXICAM 15 MG/1
15 TABLET ORAL DAILY
COMMUNITY
Start: 2022-01-24

## 2022-02-10 NOTE — PROGRESS NOTES
Chief Complaint  Weight Loss (medication follow up )    Subjective        Angelika Shukla presents to [facility]  Obesity:  Here for 1 month follow up.  Never got her Saxenda filled and has had an acute illness and helped after got steroid.    Has had headaches with wegovy for the first 3 days but takes her nurtec and butalbital and helps.  Notes that she is not needing to increase her preventative medication.  She is already taking 50mg of Topamax twice a day.                Past History:    Medical History: has a past medical history of Allergies, Anxiety (2014), Asthma, Bowel disease, Broken bones, Crohn disease (HCC), Diverticulitis, Diverticulosis (2010), Inflammatory bowel disease (2010), Irritable bowel syndrome (2010), Knee pain, Labral tear of shoulder, left, initial encounter (10/19/2015), Medial meniscus tear (05/17/2016), Migraines, Other abnormal glucose (ulcer), Patellar instability of right knee (07/29/2016), Peptic ulceration (2005), Shoulder impingement (11/19/2014), Shoulder pain, left (09/16/2015), Shoulder tendonitis (09/24/2014), and Stomach disorder.     Surgical History: has a past surgical history that includes Colonoscopy (2012); Hysterectomy; Knee arthroscopy, medial patello femoral ligament repair; Breast reconstruction (Bilateral); and Shoulder AC Joint Repair.     Family History: family history includes Breast cancer in her maternal grandmother; COPD in her mother; Heart disease in her maternal grandmother; Stroke in her father, maternal grandfather, and maternal grandmother.     Social History: reports that she has never smoked. She has never used smokeless tobacco. She reports that she does not drink alcohol and does not use drugs.    Allergies: Hydrocodone, Cephalosporins, Erythromycin, Hydrocodone-acetaminophen, and Sulfa antibiotics          Current Outpatient Medications:   •  Bacillus Coagulans-Inulin (Probiotic) 1-250 BILLION-MG capsule,  Probiotic 100 billion cell oral capsule take 1 capsule by oral route daily for 30 days   Suspended, Disp: , Rfl:   •  busPIRone (BUSPAR) 30 MG tablet, TAKE 1 TABLET BY MOUTH TWICE A DAY AS DIRECTED, Disp: , Rfl:   •  butalbital-acetaminophen-caffeine (FIORICET, ESGIC) -40 MG per tablet, Take 1 tablet by mouth Every 6 (Six) Hours As Needed for Headache., Disp: 60 tablet, Rfl: 1  •  dicyclomine (BENTYL) 20 MG tablet, Take  by mouth., Disp: , Rfl:   •  eszopiclone (Lunesta) 3 MG tablet, Lunesta 3 mg oral tablet take 1 tablet (3 mg) by oral route once daily at bedtime for 30 days   Suspended, Disp: , Rfl:   •  linaclotide (Linzess) 290 MCG capsule capsule, Linzess 290 mcg oral capsule take 1 capsule (290 mcg) by oral route once daily on an empty stomach at least 30 minutes before 1st meal of the day for 30 days   Suspended, Disp: , Rfl:   •  LORazepam (ATIVAN) 0.5 MG tablet, TAKE ONE TABLET BY MOUTH TWO TIMES A DAY AS NEEDED FOR SEVERE ANXIETY, Disp: , Rfl:   •  lubiprostone (Amitiza) 24 MCG capsule, Amitiza 24 mcg oral capsule take 1 capsule (24 mcg) by oral route 2 times per day with food and water for 30 days   Active, Disp: , Rfl:   •  meloxicam (MOBIC) 15 MG tablet, Take 15 mg by mouth Daily., Disp: , Rfl:   •  Meth-Hyo-M Bl-Na Phos-Ph Sal (Uro-MP) 118 MG capsule, TAKE ONE CAPSULE BY MOUTH FOUR TIMES A DAY AS NEEDED FOR 3 DAYS, Disp: , Rfl:   •  Mirabegron ER (MYRBETRIQ) 50 MG tablet sustained-release 24 hour 24 hr tablet, Take 50 mg by mouth Daily., Disp: , Rfl:   •  mirtazapine (REMERON) 15 MG tablet, Take 15 mg by mouth every night at bedtime., Disp: , Rfl:   •  nexIUM 40 MG capsule, TAKE ONE CAPSULE BY MOUTH ONCE DAILY, Disp: 30 capsule, Rfl: 6  •  Nurtec 75 MG dispersible tablet, PLACE 1 TABLET ON TONGUE, ALLOW TO DISSOLVE THEN SWALLOW ONCE AS NEEDED FOR MIGRAINE; MAX 1 DOSE/24 HRS, Disp: 8 tablet, Rfl: 5  •  PARoxetine (PAXIL) 40 MG tablet, Take 40 mg by mouth Every Night., Disp: , Rfl:   •   "phenazopyridine (Pyridium) 100 MG tablet, Take 1 tablet by mouth 3 (Three) Times a Day As Needed for Bladder Spasms., Disp: 10 tablet, Rfl: 0  •  propranolol (INDERAL) 10 MG tablet, Take 10 mg by mouth 3 (Three) Times a Day As Needed. for anxiety, Disp: , Rfl:   •  Semaglutide-Weight Management 0.25 MG/0.5ML solution auto-injector, Inject 0.25 mg under the skin into the appropriate area as directed 1 (One) Time Per Week., Disp: 2 mL, Rfl: 2  •  topiramate (TOPAMAX) 50 MG tablet, TAKE 1 TABLET BY MOUTH TWICE A DAY, Disp: 60 tablet, Rfl: 6  •  busPIRone (BUSPAR) 15 MG tablet, buspirone 15 mg oral tablet take 1 tablet by oral route 3 times a day   Active, Disp: , Rfl:     Medications Discontinued During This Encounter   Medication Reason   • butalbital-acetaminophen-caffeine (FIORICET, ESGIC) -40 MG per tablet Reorder         Review of Systems   Constitutional: Negative for fever.   Respiratory: Negative for cough and shortness of breath.    Cardiovascular: Negative for chest pain, palpitations and leg swelling.   Neurological: Negative for dizziness, weakness, numbness and headache.        Objective         Vitals:    02/10/22 0958   BP: 121/89   BP Location: Right arm   Patient Position: Sitting   Cuff Size: Adult   Pulse: 67   Temp: 97.9 °F (36.6 °C)   TempSrc: Temporal   SpO2: 99%   Weight: 84.5 kg (186 lb 3.2 oz)   Height: 167.6 cm (65.98\")     Body mass index is 30.07 kg/m².         Physical Exam  Vitals reviewed.   Constitutional:       Appearance: Normal appearance. She is well-developed.   HENT:      Head: Normocephalic and atraumatic.      Mouth/Throat:      Pharynx: No oropharyngeal exudate.   Eyes:      Conjunctiva/sclera: Conjunctivae normal.      Pupils: Pupils are equal, round, and reactive to light.   Cardiovascular:      Rate and Rhythm: Normal rate and regular rhythm.      Heart sounds: No murmur heard.  No friction rub. No gallop.    Pulmonary:      Effort: Pulmonary effort is normal.      " Breath sounds: Normal breath sounds. No wheezing or rhonchi.   Skin:     General: Skin is warm and dry.   Neurological:      Mental Status: She is alert and oriented to person, place, and time.   Psychiatric:         Mood and Affect: Mood and affect normal.         Behavior: Behavior normal.         Thought Content: Thought content normal.         Judgment: Judgment normal.             Result Review :               Assessment and Plan     Diagnoses and all orders for this visit:    1. Class 1 drug-induced obesity with body mass index (BMI) of 30.0 to 30.9 in adult, unspecified whether serious comorbidity present (Primary)  Comments:  continue Wegovy at current dose.    2. Migraine without aura and without status migrainosus, not intractable  Comments:  continue nurtec and will appeal to get for patient if able.  Orders:  -     butalbital-acetaminophen-caffeine (FIORICET, ESGIC) -40 MG per tablet; Take 1 tablet by mouth Every 6 (Six) Hours As Needed for Headache.  Dispense: 60 tablet; Refill: 1              Follow Up     Return in about 2 months (around 4/10/2022).    Patient was given instructions and counseling regarding her condition or for health maintenance advice. Please see specific information pulled into the AVS if appropriate.

## 2022-03-31 ENCOUNTER — PATIENT MESSAGE (OUTPATIENT)
Dept: FAMILY MEDICINE CLINIC | Facility: CLINIC | Age: 47
End: 2022-03-31

## 2022-03-31 DIAGNOSIS — L65.9 HAIR LOSS: Primary | ICD-10-CM

## 2022-04-01 NOTE — TELEPHONE ENCOUNTER
From: Angelika Shukla  To: LYNDA Benavidez  Sent: 3/31/2022 5:54 PM EDT  Subject: Bloodwork     Jesse   Okay with my hair falling out do we need to do bloodwork before I come to see you on Monday.

## 2022-04-04 ENCOUNTER — LAB (OUTPATIENT)
Dept: LAB | Facility: HOSPITAL | Age: 47
End: 2022-04-04

## 2022-04-04 ENCOUNTER — OFFICE VISIT (OUTPATIENT)
Dept: FAMILY MEDICINE CLINIC | Facility: CLINIC | Age: 47
End: 2022-04-04

## 2022-04-04 VITALS
SYSTOLIC BLOOD PRESSURE: 118 MMHG | HEART RATE: 88 BPM | HEIGHT: 65 IN | TEMPERATURE: 97.3 F | WEIGHT: 187.6 LBS | BODY MASS INDEX: 31.25 KG/M2 | DIASTOLIC BLOOD PRESSURE: 72 MMHG | OXYGEN SATURATION: 99 %

## 2022-04-04 DIAGNOSIS — L65.9 HAIR LOSS: Primary | ICD-10-CM

## 2022-04-04 DIAGNOSIS — E66.1 CLASS 1 DRUG-INDUCED OBESITY WITH BODY MASS INDEX (BMI) OF 30.0 TO 30.9 IN ADULT, UNSPECIFIED WHETHER SERIOUS COMORBIDITY PRESENT: ICD-10-CM

## 2022-04-04 DIAGNOSIS — K90.0 CELIAC DISEASE: ICD-10-CM

## 2022-04-04 DIAGNOSIS — L65.9 HAIR LOSS: ICD-10-CM

## 2022-04-04 LAB
25(OH)D3 SERPL-MCNC: 31.4 NG/ML (ref 30–100)
ALBUMIN SERPL-MCNC: 4.5 G/DL (ref 3.5–5.2)
ALBUMIN/GLOB SERPL: 1.8 G/DL
ALP SERPL-CCNC: 69 U/L (ref 39–117)
ALT SERPL W P-5'-P-CCNC: 15 U/L (ref 1–33)
ANION GAP SERPL CALCULATED.3IONS-SCNC: 9.3 MMOL/L (ref 5–15)
AST SERPL-CCNC: 16 U/L (ref 1–32)
BASOPHILS # BLD AUTO: 0.06 10*3/MM3 (ref 0–0.2)
BASOPHILS NFR BLD AUTO: 0.7 % (ref 0–1.5)
BILIRUB SERPL-MCNC: 0.2 MG/DL (ref 0–1.2)
BUN SERPL-MCNC: 17 MG/DL (ref 6–20)
BUN/CREAT SERPL: 19.5 (ref 7–25)
CALCIUM SPEC-SCNC: 9.5 MG/DL (ref 8.6–10.5)
CHLORIDE SERPL-SCNC: 106 MMOL/L (ref 98–107)
CO2 SERPL-SCNC: 24.7 MMOL/L (ref 22–29)
CREAT SERPL-MCNC: 0.87 MG/DL (ref 0.57–1)
DEPRECATED RDW RBC AUTO: 43.5 FL (ref 37–54)
EGFRCR SERPLBLD CKD-EPI 2021: 81.8 ML/MIN/1.73
EOSINOPHIL # BLD AUTO: 0.39 10*3/MM3 (ref 0–0.4)
EOSINOPHIL NFR BLD AUTO: 4.8 % (ref 0.3–6.2)
ERYTHROCYTE [DISTWIDTH] IN BLOOD BY AUTOMATED COUNT: 13.3 % (ref 12.3–15.4)
FERRITIN SERPL-MCNC: 51.7 NG/ML (ref 13–150)
GLOBULIN UR ELPH-MCNC: 2.5 GM/DL
GLUCOSE SERPL-MCNC: 93 MG/DL (ref 65–99)
HCT VFR BLD AUTO: 38.7 % (ref 34–46.6)
HGB BLD-MCNC: 13 G/DL (ref 12–15.9)
IMM GRANULOCYTES # BLD AUTO: 0.03 10*3/MM3 (ref 0–0.05)
IMM GRANULOCYTES NFR BLD AUTO: 0.4 % (ref 0–0.5)
LYMPHOCYTES # BLD AUTO: 2.21 10*3/MM3 (ref 0.7–3.1)
LYMPHOCYTES NFR BLD AUTO: 27.3 % (ref 19.6–45.3)
MCH RBC QN AUTO: 30.4 PG (ref 26.6–33)
MCHC RBC AUTO-ENTMCNC: 33.6 G/DL (ref 31.5–35.7)
MCV RBC AUTO: 90.4 FL (ref 79–97)
MONOCYTES # BLD AUTO: 0.53 10*3/MM3 (ref 0.1–0.9)
MONOCYTES NFR BLD AUTO: 6.5 % (ref 5–12)
NEUTROPHILS NFR BLD AUTO: 4.88 10*3/MM3 (ref 1.7–7)
NEUTROPHILS NFR BLD AUTO: 60.3 % (ref 42.7–76)
NRBC BLD AUTO-RTO: 0 /100 WBC (ref 0–0.2)
PLATELET # BLD AUTO: 317 10*3/MM3 (ref 140–450)
PMV BLD AUTO: 10.9 FL (ref 6–12)
POTASSIUM SERPL-SCNC: 4.2 MMOL/L (ref 3.5–5.2)
PROT SERPL-MCNC: 7 G/DL (ref 6–8.5)
RBC # BLD AUTO: 4.28 10*6/MM3 (ref 3.77–5.28)
SODIUM SERPL-SCNC: 140 MMOL/L (ref 136–145)
TSH SERPL DL<=0.05 MIU/L-ACNC: 2.62 UIU/ML (ref 0.27–4.2)
VIT B12 BLD-MCNC: 293 PG/ML (ref 211–946)
WBC NRBC COR # BLD: 8.1 10*3/MM3 (ref 3.4–10.8)

## 2022-04-04 PROCEDURE — 82607 VITAMIN B-12: CPT

## 2022-04-04 PROCEDURE — 36415 COLL VENOUS BLD VENIPUNCTURE: CPT

## 2022-04-04 PROCEDURE — 99213 OFFICE O/P EST LOW 20 MIN: CPT | Performed by: NURSE PRACTITIONER

## 2022-04-04 PROCEDURE — 80050 GENERAL HEALTH PANEL: CPT

## 2022-04-04 PROCEDURE — 82728 ASSAY OF FERRITIN: CPT

## 2022-04-04 PROCEDURE — 82306 VITAMIN D 25 HYDROXY: CPT

## 2022-04-04 NOTE — PROGRESS NOTES
2 month followChief Complaint  Follow-up (2 month follow up)    Subjective        Angelika Shukla presents to [facility]  2 month follow up.      Wegovy.  States has had an increase in hair loss and headache for 3 days after taking.  States right eye having some blurred vision on right field of vision and is getting better.  Has gained 12 pounds.    Having abdominal bloating but recently diagnosed with Celiac disease.        Past History:    Medical History: has a past medical history of Allergies, Anxiety (2014), Asthma, Bowel disease, Broken bones, Crohn disease (HCC), Diverticulitis, Diverticulosis (2010), Inflammatory bowel disease (2010), Irritable bowel syndrome (2010), Knee pain, Labral tear of shoulder, left, initial encounter (10/19/2015), Medial meniscus tear (05/17/2016), Migraines, Other abnormal glucose (ulcer), Patellar instability of right knee (07/29/2016), Peptic ulceration (2005), Shoulder impingement (11/19/2014), Shoulder pain, left (09/16/2015), Shoulder tendonitis (09/24/2014), and Stomach disorder.     Surgical History: has a past surgical history that includes Colonoscopy (2012); Hysterectomy; Knee arthroscopy, medial patello femoral ligament repair; Breast reconstruction (Bilateral); and Shoulder AC Joint Repair.     Family History: family history includes Breast cancer in her maternal grandmother; COPD in her mother; Heart disease in her maternal grandmother; Stroke in her father, maternal grandfather, and maternal grandmother.     Social History: reports that she has never smoked. She has never used smokeless tobacco. She reports that she does not drink alcohol and does not use drugs.    Allergies: Hydrocodone, Cephalosporins, Erythromycin, Hydrocodone-acetaminophen, Semaglutide(0.25 or 0.5mg-dos), and Sulfa antibiotics          Current Outpatient Medications:   •  Bacillus Coagulans-Inulin (Probiotic) 1-250 BILLION-MG capsule, Probiotic 100 billion cell  oral capsule take 1 capsule by oral route daily for 30 days   Suspended, Disp: , Rfl:   •  busPIRone (BUSPAR) 30 MG tablet, TAKE 1 TABLET BY MOUTH TWICE A DAY AS DIRECTED, Disp: , Rfl:   •  butalbital-acetaminophen-caffeine (FIORICET, ESGIC) -40 MG per tablet, Take 1 tablet by mouth Every 6 (Six) Hours As Needed for Headache., Disp: 60 tablet, Rfl: 1  •  dicyclomine (BENTYL) 20 MG tablet, Take  by mouth., Disp: , Rfl:   •  eszopiclone (LUNESTA) 3 MG tablet, Lunesta 3 mg oral tablet take 1 tablet (3 mg) by oral route once daily at bedtime for 30 days   Suspended, Disp: , Rfl:   •  linaclotide (LINZESS) 290 MCG capsule capsule, Linzess 290 mcg oral capsule take 1 capsule (290 mcg) by oral route once daily on an empty stomach at least 30 minutes before 1st meal of the day for 30 days   Suspended, Disp: , Rfl:   •  LORazepam (ATIVAN) 0.5 MG tablet, TAKE ONE TABLET BY MOUTH TWO TIMES A DAY AS NEEDED FOR SEVERE ANXIETY, Disp: , Rfl:   •  lubiprostone (AMITIZA) 24 MCG capsule, Amitiza 24 mcg oral capsule take 1 capsule (24 mcg) by oral route 2 times per day with food and water for 30 days   Active, Disp: , Rfl:   •  meloxicam (MOBIC) 15 MG tablet, Take 15 mg by mouth Daily., Disp: , Rfl:   •  Meth-Hyo-M Bl-Na Phos-Ph Sal (Uro-MP) 118 MG capsule, TAKE ONE CAPSULE BY MOUTH FOUR TIMES A DAY AS NEEDED FOR 3 DAYS, Disp: , Rfl:   •  Mirabegron ER (MYRBETRIQ) 50 MG tablet sustained-release 24 hour 24 hr tablet, Take 50 mg by mouth Daily., Disp: , Rfl:   •  mirtazapine (REMERON) 15 MG tablet, Take 15 mg by mouth every night at bedtime., Disp: , Rfl:   •  nexIUM 40 MG capsule, TAKE ONE CAPSULE BY MOUTH ONCE DAILY, Disp: 30 capsule, Rfl: 6  •  Nurtec 75 MG dispersible tablet, PLACE 1 TABLET ON TONGUE, ALLOW TO DISSOLVE THEN SWALLOW ONCE AS NEEDED FOR MIGRAINE; MAX 1 DOSE/24 HRS, Disp: 8 tablet, Rfl: 5  •  PARoxetine (PAXIL) 40 MG tablet, Take 40 mg by mouth Every Night., Disp: , Rfl:   •  propranolol (INDERAL) 10 MG tablet,  "Take 10 mg by mouth 3 (Three) Times a Day As Needed. for anxiety, Disp: , Rfl:   •  topiramate (TOPAMAX) 50 MG tablet, TAKE 1 TABLET BY MOUTH TWICE A DAY, Disp: 60 tablet, Rfl: 6    Medications Discontinued During This Encounter   Medication Reason   • busPIRone (BUSPAR) 15 MG tablet *Therapy completed   • phenazopyridine (Pyridium) 100 MG tablet *Therapy completed   • Semaglutide-Weight Management 0.25 MG/0.5ML solution auto-injector *Therapy completed         Review of Systems   Constitutional: Negative for fever.   Respiratory: Negative for cough and shortness of breath.    Cardiovascular: Negative for chest pain, palpitations and leg swelling.   Neurological: Negative for dizziness, weakness, numbness and headache.        Objective         Vitals:    04/04/22 0933   BP: 118/72   BP Location: Right arm   Patient Position: Sitting   Cuff Size: Adult   Pulse: 88   Temp: 97.3 °F (36.3 °C)   TempSrc: Temporal   SpO2: 99%   Weight: 85.1 kg (187 lb 9.6 oz)   Height: 165.1 cm (65\")     Body mass index is 31.22 kg/m².         Physical Exam  Vitals reviewed.   Constitutional:       Appearance: Normal appearance. She is well-developed.   HENT:      Head: Normocephalic and atraumatic.      Mouth/Throat:      Pharynx: No oropharyngeal exudate.   Eyes:      Conjunctiva/sclera: Conjunctivae normal.      Pupils: Pupils are equal, round, and reactive to light.   Cardiovascular:      Rate and Rhythm: Normal rate and regular rhythm.      Heart sounds: Normal heart sounds. No murmur heard.    No friction rub. No gallop.   Pulmonary:      Effort: Pulmonary effort is normal.      Breath sounds: Normal breath sounds. No wheezing or rhonchi.   Skin:     General: Skin is warm and dry.   Neurological:      Mental Status: She is alert and oriented to person, place, and time.   Psychiatric:         Mood and Affect: Mood and affect normal.         Behavior: Behavior normal.         Thought Content: Thought content normal.         Judgment: " Judgment normal.             Result Review :               Assessment and Plan     Diagnoses and all orders for this visit:    1. Hair loss (Primary)    2. Class 1 drug-induced obesity with body mass index (BMI) of 30.0 to 30.9 in adult, unspecified whether serious comorbidity present  Comments:  will try to start gluten free.    3. Celiac disease  Comments:  gluten free to see if helps with abdominal weight.              Follow Up     Return in about 3 months (around 7/4/2022).    Patient was given instructions and counseling regarding her condition or for health maintenance advice. Please see specific information pulled into the AVS if appropriate.

## 2022-04-11 ENCOUNTER — PATIENT MESSAGE (OUTPATIENT)
Dept: FAMILY MEDICINE CLINIC | Facility: CLINIC | Age: 47
End: 2022-04-11

## 2022-04-11 DIAGNOSIS — R60.0 PEDAL EDEMA: Primary | ICD-10-CM

## 2022-04-11 RX ORDER — HYDROCHLOROTHIAZIDE 12.5 MG/1
12.5 TABLET ORAL DAILY
Qty: 30 TABLET | Refills: 1 | Status: SHIPPED | OUTPATIENT
Start: 2022-04-11 | End: 2022-04-12 | Stop reason: SDUPTHER

## 2022-04-11 NOTE — TELEPHONE ENCOUNTER
From: Angelika Shukla  To: LYNDA Benavidez  Sent: 4/11/2022 6:04 AM EDT  Subject: Fluid retention     Jesse   I have been eating gluten free since our last visit.. my  has thinks I'm holding fluid. I have gained 4 pounds. I'm only drinking water but a lot of water. I'm not using the bathroom any extra. So do I need to try something that pulls fluid off?_  I'm just real concerned that something is not right because I should be losing weight

## 2022-04-12 ENCOUNTER — TELEPHONE (OUTPATIENT)
Dept: FAMILY MEDICINE CLINIC | Facility: CLINIC | Age: 47
End: 2022-04-12

## 2022-04-12 DIAGNOSIS — R60.0 PEDAL EDEMA: ICD-10-CM

## 2022-04-12 RX ORDER — HYDROCHLOROTHIAZIDE 12.5 MG/1
12.5 TABLET ORAL DAILY
Qty: 30 TABLET | Refills: 1 | Status: SHIPPED | OUTPATIENT
Start: 2022-04-12

## 2022-04-12 NOTE — TELEPHONE ENCOUNTER
"Patient sent a eTapestry message asking to send in her \"water pill\" to a different pharmacy.     Pharmacy confirmed and medication sent in.   "

## 2022-05-26 ENCOUNTER — LAB (OUTPATIENT)
Dept: LAB | Facility: HOSPITAL | Age: 47
End: 2022-05-26

## 2022-05-26 ENCOUNTER — TRANSCRIBE ORDERS (OUTPATIENT)
Dept: LAB | Facility: HOSPITAL | Age: 47
End: 2022-05-26

## 2022-05-26 ENCOUNTER — OFFICE VISIT (OUTPATIENT)
Dept: FAMILY MEDICINE CLINIC | Facility: CLINIC | Age: 47
End: 2022-05-26

## 2022-05-26 VITALS
DIASTOLIC BLOOD PRESSURE: 80 MMHG | HEIGHT: 65 IN | SYSTOLIC BLOOD PRESSURE: 110 MMHG | OXYGEN SATURATION: 98 % | HEART RATE: 89 BPM | TEMPERATURE: 97.5 F | RESPIRATION RATE: 16 BRPM | WEIGHT: 193.6 LBS | BODY MASS INDEX: 32.26 KG/M2

## 2022-05-26 DIAGNOSIS — T14.8XXA MUSCLE STRAIN: ICD-10-CM

## 2022-05-26 DIAGNOSIS — N32.81 OAB (OVERACTIVE BLADDER): Primary | ICD-10-CM

## 2022-05-26 DIAGNOSIS — Z79.899 MEDICATION MANAGEMENT: ICD-10-CM

## 2022-05-26 DIAGNOSIS — K58.0 IRRITABLE BOWEL SYNDROME WITH DIARRHEA: ICD-10-CM

## 2022-05-26 DIAGNOSIS — Z00.00 ANNUAL PHYSICAL EXAM: ICD-10-CM

## 2022-05-26 DIAGNOSIS — K58.0 IRRITABLE BOWEL SYNDROME WITH DIARRHEA: Primary | ICD-10-CM

## 2022-05-26 DIAGNOSIS — E66.09 CLASS 1 OBESITY DUE TO EXCESS CALORIES WITH BODY MASS INDEX (BMI) OF 32.0 TO 32.9 IN ADULT, UNSPECIFIED WHETHER SERIOUS COMORBIDITY PRESENT: ICD-10-CM

## 2022-05-26 LAB
ALBUMIN SERPL-MCNC: 4.8 G/DL (ref 3.5–5.2)
ALBUMIN/GLOB SERPL: 1.8 G/DL
ALP SERPL-CCNC: 82 U/L (ref 39–117)
ALT SERPL W P-5'-P-CCNC: 26 U/L (ref 1–33)
AMPHET+METHAMPHET UR QL: NEGATIVE
AMPHETAMINE INTERNAL CONTROL: NORMAL
AMPHETAMINES UR QL: NEGATIVE
ANION GAP SERPL CALCULATED.3IONS-SCNC: 7.6 MMOL/L (ref 5–15)
AST SERPL-CCNC: 23 U/L (ref 1–32)
BARBITURATE INTERNAL CONTROL: NORMAL
BARBITURATES UR QL SCN: NEGATIVE
BASOPHILS # BLD AUTO: 0.07 10*3/MM3 (ref 0–0.2)
BASOPHILS NFR BLD AUTO: 0.9 % (ref 0–1.5)
BENZODIAZ UR QL SCN: NEGATIVE
BENZODIAZEPINE INTERNAL CONTROL: NORMAL
BILIRUB SERPL-MCNC: 0.3 MG/DL (ref 0–1.2)
BUN SERPL-MCNC: 16 MG/DL (ref 6–20)
BUN/CREAT SERPL: 17.4 (ref 7–25)
BUPRENORPHINE INTERNAL CONTROL: NORMAL
BUPRENORPHINE SERPL-MCNC: NEGATIVE NG/ML
CALCIUM SPEC-SCNC: 9.5 MG/DL (ref 8.6–10.5)
CANNABINOIDS SERPL QL: NEGATIVE
CHLORIDE SERPL-SCNC: 105 MMOL/L (ref 98–107)
CO2 SERPL-SCNC: 24.4 MMOL/L (ref 22–29)
COCAINE INTERNAL CONTROL: NORMAL
COCAINE UR QL: NEGATIVE
CREAT SERPL-MCNC: 0.92 MG/DL (ref 0.57–1)
DEPRECATED RDW RBC AUTO: 47.6 FL (ref 37–54)
EGFRCR SERPLBLD CKD-EPI 2021: 76.5 ML/MIN/1.73
EOSINOPHIL # BLD AUTO: 0.17 10*3/MM3 (ref 0–0.4)
EOSINOPHIL NFR BLD AUTO: 2.1 % (ref 0.3–6.2)
ERYTHROCYTE [DISTWIDTH] IN BLOOD BY AUTOMATED COUNT: 14.1 % (ref 12.3–15.4)
EXPIRATION DATE: NORMAL
GLOBULIN UR ELPH-MCNC: 2.6 GM/DL
GLUCOSE SERPL-MCNC: 90 MG/DL (ref 65–99)
HCT VFR BLD AUTO: 41.3 % (ref 34–46.6)
HGB BLD-MCNC: 13.7 G/DL (ref 12–15.9)
IMM GRANULOCYTES # BLD AUTO: 0.04 10*3/MM3 (ref 0–0.05)
IMM GRANULOCYTES NFR BLD AUTO: 0.5 % (ref 0–0.5)
LYMPHOCYTES # BLD AUTO: 2.56 10*3/MM3 (ref 0.7–3.1)
LYMPHOCYTES NFR BLD AUTO: 31.6 % (ref 19.6–45.3)
Lab: NORMAL
MCH RBC QN AUTO: 30.3 PG (ref 26.6–33)
MCHC RBC AUTO-ENTMCNC: 33.2 G/DL (ref 31.5–35.7)
MCV RBC AUTO: 91.4 FL (ref 79–97)
MDMA (ECSTASY) INTERNAL CONTROL: NORMAL
MDMA UR QL SCN: NEGATIVE
METHADONE INTERNAL CONTROL: NORMAL
METHADONE UR QL SCN: NEGATIVE
METHAMPHETAMINE INTERNAL CONTROL: NORMAL
MONOCYTES # BLD AUTO: 0.54 10*3/MM3 (ref 0.1–0.9)
MONOCYTES NFR BLD AUTO: 6.7 % (ref 5–12)
NEUTROPHILS NFR BLD AUTO: 4.72 10*3/MM3 (ref 1.7–7)
NEUTROPHILS NFR BLD AUTO: 58.2 % (ref 42.7–76)
NRBC BLD AUTO-RTO: 0 /100 WBC (ref 0–0.2)
OPIATES INTERNAL CONTROL: NORMAL
OPIATES UR QL: NEGATIVE
OXYCODONE INTERNAL CONTROL: NORMAL
OXYCODONE UR QL SCN: NEGATIVE
PCP UR QL SCN: NEGATIVE
PHENCYCLIDINE INTERNAL CONTROL: NORMAL
PLATELET # BLD AUTO: 373 10*3/MM3 (ref 140–450)
PMV BLD AUTO: 10.2 FL (ref 6–12)
POTASSIUM SERPL-SCNC: 4.3 MMOL/L (ref 3.5–5.2)
PROT SERPL-MCNC: 7.4 G/DL (ref 6–8.5)
RBC # BLD AUTO: 4.52 10*6/MM3 (ref 3.77–5.28)
SODIUM SERPL-SCNC: 137 MMOL/L (ref 136–145)
THC INTERNAL CONTROL: NORMAL
TSH SERPL DL<=0.05 MIU/L-ACNC: 3.7 UIU/ML (ref 0.27–4.2)
WBC NRBC COR # BLD: 8.1 10*3/MM3 (ref 3.4–10.8)

## 2022-05-26 PROCEDURE — 80050 GENERAL HEALTH PANEL: CPT

## 2022-05-26 PROCEDURE — 80305 DRUG TEST PRSMV DIR OPT OBS: CPT | Performed by: NURSE PRACTITIONER

## 2022-05-26 PROCEDURE — 80061 LIPID PANEL: CPT

## 2022-05-26 PROCEDURE — 82784 ASSAY IGA/IGD/IGG/IGM EACH: CPT

## 2022-05-26 PROCEDURE — 99214 OFFICE O/P EST MOD 30 MIN: CPT | Performed by: NURSE PRACTITIONER

## 2022-05-26 PROCEDURE — 36415 COLL VENOUS BLD VENIPUNCTURE: CPT

## 2022-05-26 RX ORDER — PHENTERMINE HYDROCHLORIDE 37.5 MG/1
37.5 CAPSULE ORAL EVERY MORNING
Qty: 30 CAPSULE | Refills: 0 | Status: SHIPPED | OUTPATIENT
Start: 2022-05-26 | End: 2022-06-24 | Stop reason: SDUPTHER

## 2022-05-26 RX ORDER — CYCLOBENZAPRINE HCL 10 MG
10 TABLET ORAL 3 TIMES DAILY PRN
Qty: 60 TABLET | Refills: 1 | Status: SHIPPED | OUTPATIENT
Start: 2022-05-26

## 2022-05-26 RX ORDER — ONDANSETRON 4 MG/1
TABLET, ORALLY DISINTEGRATING ORAL
COMMUNITY
Start: 2022-05-20

## 2022-05-26 NOTE — PROGRESS NOTES
Chief Complaint  Annual Exam, Back Pain (Pulled a muscle), and Urinary Incontinence (Needs a refill)    Subjective        Angelika Shukla presents to [facility]  Annual exam:  Still attempting to lose weight and exercise as able but knee still painful.  Has had more weight gain.  States has stopped gluten, sugars and breads she stated.   Would like to try phentermine again.      Back pain: pulled a muscle in back and hurts to breathe.  Was unloading scrap and having pain at shoulder scapular and radiates to front on ribs.  Denies trauma  Got better then started moving things around and started again.  Has tried lidocaine patches and didn't help and has tried ibuprofen and tylenol and didn't help.    Urinary incontinence:  Needs a refill of her Myrbetriq.        Past History:    Medical History: has a past medical history of Allergies, Anxiety (2014), Asthma, Bowel disease, Broken bones, Crohn disease (HCC), Diverticulitis, Diverticulosis (2010), Inflammatory bowel disease (2010), Irritable bowel syndrome (2010), Knee pain, Labral tear of shoulder, left, initial encounter (10/19/2015), Medial meniscus tear (05/17/2016), Migraines, Other abnormal glucose (ulcer), Patellar instability of right knee (07/29/2016), Peptic ulceration (2005), Shoulder impingement (11/19/2014), Shoulder pain, left (09/16/2015), Shoulder tendonitis (09/24/2014), and Stomach disorder.     Surgical History: has a past surgical history that includes Colonoscopy (2012); Hysterectomy; Knee arthroscopy, medial patello femoral ligament repair; Breast reconstruction (Bilateral); and Shoulder AC Joint Repair.     Family History: family history includes Breast cancer in her maternal grandmother; COPD in her mother; Heart disease in her maternal grandmother; Stroke in her father, maternal grandfather, and maternal grandmother.     Social History: reports that she has never smoked. She has never used smokeless tobacco.  She reports that she does not drink alcohol and does not use drugs.    Allergies: Hydrocodone, Cephalosporins, Erythromycin, Hydrocodone-acetaminophen, Semaglutide(0.25 or 0.5mg-dos), and Sulfa antibiotics          Current Outpatient Medications:   •  Bacillus Coagulans-Inulin (Probiotic) 1-250 BILLION-MG capsule, Probiotic 100 billion cell oral capsule take 1 capsule by oral route daily for 30 days   Suspended, Disp: , Rfl:   •  busPIRone (BUSPAR) 30 MG tablet, TAKE 1 TABLET BY MOUTH TWICE A DAY AS DIRECTED, Disp: , Rfl:   •  butalbital-acetaminophen-caffeine (FIORICET, ESGIC) -40 MG per tablet, Take 1 tablet by mouth Every 6 (Six) Hours As Needed for Headache., Disp: 60 tablet, Rfl: 1  •  dicyclomine (BENTYL) 20 MG tablet, Take  by mouth., Disp: , Rfl:   •  hydroCHLOROthiazide (HYDRODIURIL) 12.5 MG tablet, Take 1 tablet by mouth Daily., Disp: 30 tablet, Rfl: 1  •  linaclotide (LINZESS) 290 MCG capsule capsule, Linzess 290 mcg oral capsule take 1 capsule (290 mcg) by oral route once daily on an empty stomach at least 30 minutes before 1st meal of the day for 30 days   Suspended, Disp: , Rfl:   •  LORazepam (ATIVAN) 0.5 MG tablet, TAKE ONE TABLET BY MOUTH TWO TIMES A DAY AS NEEDED FOR SEVERE ANXIETY, Disp: , Rfl:   •  lubiprostone (AMITIZA) 24 MCG capsule, Amitiza 24 mcg oral capsule take 1 capsule (24 mcg) by oral route 2 times per day with food and water for 30 days   Active, Disp: , Rfl:   •  meloxicam (MOBIC) 15 MG tablet, Take 15 mg by mouth Daily., Disp: , Rfl:   •  Meth-Hyo-M Bl-Na Phos-Ph Sal (Uro-MP) 118 MG capsule, TAKE ONE CAPSULE BY MOUTH FOUR TIMES A DAY AS NEEDED FOR 3 DAYS, Disp: , Rfl:   •  Mirabegron ER (MYRBETRIQ) 50 MG tablet sustained-release 24 hour 24 hr tablet, Take 50 mg by mouth Daily., Disp: 90 tablet, Rfl: 1  •  nexIUM 40 MG capsule, TAKE ONE CAPSULE BY MOUTH ONCE DAILY, Disp: 30 capsule, Rfl: 6  •  Nurtec 75 MG dispersible tablet, PLACE 1 TABLET ON TONGUE, ALLOW TO DISSOLVE THEN  "SWALLOW ONCE AS NEEDED FOR MIGRAINE; MAX 1 DOSE/24 HRS, Disp: 8 tablet, Rfl: 5  •  ondansetron ODT (ZOFRAN-ODT) 4 MG disintegrating tablet, TAKE 1 TABLET BY MOUTH EVERY 8 HOURS IF NEEDED FOR NAUSEA OR VOMITING FOR UP TO 7 DAYS., Disp: , Rfl:   •  PARoxetine (PAXIL) 40 MG tablet, Take 40 mg by mouth Every Night., Disp: , Rfl:   •  propranolol (INDERAL) 10 MG tablet, Take 10 mg by mouth 3 (Three) Times a Day As Needed. for anxiety, Disp: , Rfl:   •  topiramate (TOPAMAX) 50 MG tablet, TAKE 1 TABLET BY MOUTH TWICE A DAY, Disp: 60 tablet, Rfl: 6  •  cyclobenzaprine (FLEXERIL) 10 MG tablet, Take 1 tablet by mouth 3 (Three) Times a Day As Needed for Muscle Spasms., Disp: 60 tablet, Rfl: 1  •  phentermine 37.5 MG capsule, Take 1 capsule by mouth Every Morning., Disp: 30 capsule, Rfl: 0    Medications Discontinued During This Encounter   Medication Reason   • Mirabegron ER (MYRBETRIQ) 50 MG tablet sustained-release 24 hour 24 hr tablet Reorder   • mirtazapine (REMERON) 15 MG tablet *Therapy completed   • eszopiclone (LUNESTA) 3 MG tablet *Therapy completed         Review of Systems   Constitutional: Negative for fever.   Respiratory: Negative for cough and shortness of breath.    Cardiovascular: Negative for chest pain, palpitations and leg swelling.   Neurological: Negative for dizziness, weakness, numbness and headache.        Objective         Vitals:    05/26/22 0949   BP: 110/80   BP Location: Right arm   Patient Position: Sitting   Cuff Size: Adult   Pulse: 89   Resp: 16   Temp: 97.5 °F (36.4 °C)   TempSrc: Infrared   SpO2: 98%   Weight: 87.8 kg (193 lb 9.6 oz)   Height: 165.1 cm (65\")     Body mass index is 32.22 kg/m².         Physical Exam  Vitals reviewed.   Constitutional:       Appearance: Normal appearance. She is well-developed.   HENT:      Head: Normocephalic and atraumatic.      Mouth/Throat:      Pharynx: No oropharyngeal exudate.   Eyes:      Conjunctiva/sclera: Conjunctivae normal.      Pupils: Pupils are " equal, round, and reactive to light.   Cardiovascular:      Rate and Rhythm: Normal rate and regular rhythm.      Heart sounds: Normal heart sounds. No murmur heard.    No friction rub. No gallop.   Pulmonary:      Effort: Pulmonary effort is normal.      Breath sounds: Normal breath sounds. No wheezing or rhonchi.   Skin:     General: Skin is warm and dry.   Neurological:      Mental Status: She is alert and oriented to person, place, and time.   Psychiatric:         Mood and Affect: Mood and affect normal.         Behavior: Behavior normal.         Thought Content: Thought content normal.         Judgment: Judgment normal.             Result Review :               Assessment and Plan     Diagnoses and all orders for this visit:    1. OAB (overactive bladder) (Primary)  -     Mirabegron ER (MYRBETRIQ) 50 MG tablet sustained-release 24 hour 24 hr tablet; Take 50 mg by mouth Daily.  Dispense: 90 tablet; Refill: 1    2. Class 1 obesity due to excess calories with body mass index (BMI) of 32.0 to 32.9 in adult, unspecified whether serious comorbidity present  -     POC Urine Drug Screen [facility]  -     phentermine 37.5 MG capsule; Take 1 capsule by mouth Every Morning.  Dispense: 30 capsule; Refill: 0    3. Muscle strain  -     cyclobenzaprine (FLEXERIL) 10 MG tablet; Take 1 tablet by mouth 3 (Three) Times a Day As Needed for Muscle Spasms.  Dispense: 60 tablet; Refill: 1    4. Annual physical exam  -     Comprehensive Metabolic Panel; Future  -     Lipid Panel With / Chol / HDL Ratio; Future  -     Urinalysis With Culture If Indicated -; Future  -     CBC & Differential; Future  -     TSH Rfx On Abnormal To Free T4; Future    5. Medication management              Follow Up     Return in about 1 month (around 6/26/2022).    Patient was given instructions and counseling regarding her condition or for health maintenance advice. Please see specific information pulled into the AVS if appropriate.

## 2022-05-27 LAB
CHOLEST SERPL-MCNC: 182 MG/DL (ref 0–200)
HDLC SERPL QL: 3.03
HDLC SERPL-MCNC: 60 MG/DL (ref 40–60)
IGA1 MFR SER: 147 MG/DL (ref 70–400)
IGG1 SER-MCNC: 1079 MG/DL (ref 700–1600)
LDLC SERPL CALC-MCNC: 103 MG/DL (ref 0–100)
TRIGL SERPL-MCNC: 106 MG/DL (ref 0–150)
VLDLC SERPL-MCNC: 19 MG/DL (ref 5–40)

## 2022-06-24 ENCOUNTER — OFFICE VISIT (OUTPATIENT)
Dept: FAMILY MEDICINE CLINIC | Facility: CLINIC | Age: 47
End: 2022-06-24

## 2022-06-24 VITALS
SYSTOLIC BLOOD PRESSURE: 118 MMHG | HEIGHT: 65 IN | OXYGEN SATURATION: 100 % | BODY MASS INDEX: 30.89 KG/M2 | RESPIRATION RATE: 16 BRPM | WEIGHT: 185.4 LBS | DIASTOLIC BLOOD PRESSURE: 80 MMHG | TEMPERATURE: 98 F | HEART RATE: 64 BPM

## 2022-06-24 DIAGNOSIS — E66.09 CLASS 1 OBESITY DUE TO EXCESS CALORIES WITH BODY MASS INDEX (BMI) OF 30.0 TO 30.9 IN ADULT, UNSPECIFIED WHETHER SERIOUS COMORBIDITY PRESENT: Primary | ICD-10-CM

## 2022-06-24 PROCEDURE — 99213 OFFICE O/P EST LOW 20 MIN: CPT | Performed by: NURSE PRACTITIONER

## 2022-06-24 RX ORDER — PHENTERMINE HYDROCHLORIDE 37.5 MG/1
37.5 CAPSULE ORAL EVERY MORNING
Qty: 30 CAPSULE | Refills: 0 | Status: SHIPPED | OUTPATIENT
Start: 2022-06-24 | End: 2022-07-22 | Stop reason: SDUPTHER

## 2022-06-24 RX ORDER — PAROXETINE 30 MG/1
TABLET, FILM COATED ORAL
COMMUNITY
Start: 2022-06-13

## 2022-06-24 RX ORDER — ESZOPICLONE 3 MG/1
3 TABLET, FILM COATED ORAL
COMMUNITY
Start: 2022-06-13

## 2022-06-24 RX ORDER — PHENTERMINE HYDROCHLORIDE 37.5 MG/1
37.5 CAPSULE ORAL DAILY
COMMUNITY
Start: 2022-05-26 | End: 2022-06-24

## 2022-06-24 NOTE — PROGRESS NOTES
Chief Complaint  Obesity    Subjective        Angelika Shukla presents to [facility]  Obesity:  Doing well on medication and not having chest pain or shortness of breath.  Has lost 8 pounds.    Obesity  Pertinent negatives include no chest pain, coughing, fever, numbness or weakness.         Past History:    Medical History: has a past medical history of Allergies, Anxiety (2014), Asthma, Bowel disease, Broken bones, Crohn disease (HCC), Diverticulitis, Diverticulosis (2010), Inflammatory bowel disease (2010), Irritable bowel syndrome (2010), Knee pain, Labral tear of shoulder, left, initial encounter (10/19/2015), Medial meniscus tear (05/17/2016), Migraines, Other abnormal glucose (ulcer), Patellar instability of right knee (07/29/2016), Peptic ulceration (2005), Shoulder impingement (11/19/2014), Shoulder pain, left (09/16/2015), Shoulder tendonitis (09/24/2014), and Stomach disorder.     Surgical History: has a past surgical history that includes Colonoscopy (2012); Hysterectomy; Knee arthroscopy, medial patello femoral ligament repair; Breast reconstruction (Bilateral); and Shoulder AC Joint Repair.     Family History: family history includes Breast cancer in her maternal grandmother; COPD in her mother; Heart disease in her maternal grandmother; Stroke in her father, maternal grandfather, and maternal grandmother.     Social History: reports that she has never smoked. She has never used smokeless tobacco. She reports that she does not drink alcohol and does not use drugs.    Allergies: Hydrocodone, Cephalosporins, Erythromycin, Hydrocodone-acetaminophen, Semaglutide(0.25 or 0.5mg-dos), and Sulfa antibiotics          Current Outpatient Medications:   •  Bacillus Coagulans-Inulin (Probiotic) 1-250 BILLION-MG capsule, Probiotic 100 billion cell oral capsule take 1 capsule by oral route daily for 30 days   Suspended, Disp: , Rfl:   •  busPIRone (BUSPAR) 30 MG tablet, TAKE 1  TABLET BY MOUTH TWICE A DAY AS DIRECTED, Disp: , Rfl:   •  butalbital-acetaminophen-caffeine (FIORICET, ESGIC) -40 MG per tablet, Take 1 tablet by mouth Every 6 (Six) Hours As Needed for Headache., Disp: 60 tablet, Rfl: 1  •  cyclobenzaprine (FLEXERIL) 10 MG tablet, Take 1 tablet by mouth 3 (Three) Times a Day As Needed for Muscle Spasms., Disp: 60 tablet, Rfl: 1  •  dicyclomine (BENTYL) 20 MG tablet, Take  by mouth., Disp: , Rfl:   •  eszopiclone (LUNESTA) 3 MG tablet, Take 3 mg by mouth every night at bedtime., Disp: , Rfl:   •  hydroCHLOROthiazide (HYDRODIURIL) 12.5 MG tablet, Take 1 tablet by mouth Daily., Disp: 30 tablet, Rfl: 1  •  linaclotide (LINZESS) 290 MCG capsule capsule, Linzess 290 mcg oral capsule take 1 capsule (290 mcg) by oral route once daily on an empty stomach at least 30 minutes before 1st meal of the day for 30 days   Suspended, Disp: , Rfl:   •  LORazepam (ATIVAN) 0.5 MG tablet, TAKE ONE TABLET BY MOUTH TWO TIMES A DAY AS NEEDED FOR SEVERE ANXIETY, Disp: , Rfl:   •  lubiprostone (AMITIZA) 24 MCG capsule, Amitiza 24 mcg oral capsule take 1 capsule (24 mcg) by oral route 2 times per day with food and water for 30 days   Active, Disp: , Rfl:   •  meloxicam (MOBIC) 15 MG tablet, Take 15 mg by mouth Daily., Disp: , Rfl:   •  nexIUM 40 MG capsule, TAKE ONE CAPSULE BY MOUTH ONCE DAILY, Disp: 30 capsule, Rfl: 6  •  Nurtec 75 MG dispersible tablet, PLACE 1 TABLET ON TONGUE, ALLOW TO DISSOLVE THEN SWALLOW ONCE AS NEEDED FOR MIGRAINE; MAX 1 DOSE/24 HRS, Disp: 8 tablet, Rfl: 5  •  ondansetron ODT (ZOFRAN-ODT) 4 MG disintegrating tablet, TAKE 1 TABLET BY MOUTH EVERY 8 HOURS IF NEEDED FOR NAUSEA OR VOMITING FOR UP TO 7 DAYS., Disp: , Rfl:   •  PARoxetine (PAXIL) 30 MG tablet, TAKE TWO TABLETS EVERY NIGHT, Disp: , Rfl:   •  phentermine 37.5 MG capsule, Take 1 capsule by mouth Every Morning., Disp: 30 capsule, Rfl: 0  •  phentermine 37.5 MG capsule, Take 37.5 mg by mouth Daily., Disp: , Rfl:   •   "propranolol (INDERAL) 10 MG tablet, Take 10 mg by mouth 3 (Three) Times a Day As Needed. for anxiety, Disp: , Rfl:   •  topiramate (TOPAMAX) 50 MG tablet, TAKE 1 TABLET BY MOUTH TWICE A DAY, Disp: 60 tablet, Rfl: 6  •  Meth-Hyo-M Bl-Na Phos-Ph Sal (Uro-MP) 118 MG capsule, TAKE ONE CAPSULE BY MOUTH FOUR TIMES A DAY AS NEEDED FOR 3 DAYS, Disp: , Rfl:   •  Mirabegron ER (MYRBETRIQ) 50 MG tablet sustained-release 24 hour 24 hr tablet, Take 50 mg by mouth Daily., Disp: 90 tablet, Rfl: 1    Medications Discontinued During This Encounter   Medication Reason   • PARoxetine (PAXIL) 40 MG tablet *Therapy completed         Review of Systems   Constitutional: Negative for fever.   Respiratory: Negative for cough and shortness of breath.    Cardiovascular: Negative for chest pain, palpitations and leg swelling.   Neurological: Negative for dizziness, weakness, numbness and headache.        Objective         Vitals:    06/24/22 1012   BP: 118/80   BP Location: Right arm   Patient Position: Sitting   Cuff Size: Adult   Pulse: 64   Resp: 16   Temp: 98 °F (36.7 °C)   TempSrc: Infrared   SpO2: 100%   Weight: 84.1 kg (185 lb 6.4 oz)   Height: 165.1 cm (65\")     Body mass index is 30.85 kg/m².         Physical Exam  Vitals reviewed.   Constitutional:       Appearance: Normal appearance. She is well-developed.   HENT:      Head: Normocephalic and atraumatic.      Mouth/Throat:      Pharynx: No oropharyngeal exudate.   Eyes:      Conjunctiva/sclera: Conjunctivae normal.      Pupils: Pupils are equal, round, and reactive to light.   Cardiovascular:      Rate and Rhythm: Normal rate and regular rhythm.      Heart sounds: Normal heart sounds. No murmur heard.    No friction rub. No gallop.   Pulmonary:      Effort: Pulmonary effort is normal.      Breath sounds: Normal breath sounds. No wheezing or rhonchi.   Skin:     General: Skin is warm and dry.   Neurological:      Mental Status: She is alert and oriented to person, place, and time. "   Psychiatric:         Mood and Affect: Mood and affect normal.         Behavior: Behavior normal.         Thought Content: Thought content normal.         Judgment: Judgment normal.             Result Review :               Assessment and Plan     Diagnoses and all orders for this visit:    1. Class 1 obesity due to excess calories with body mass index (BMI) of 30.0 to 30.9 in adult, unspecified whether serious comorbidity present (Primary)              Follow Up     Return in about 1 month (around 7/24/2022).    Patient was given instructions and counseling regarding her condition or for health maintenance advice. Please see specific information pulled into the AVS if appropriate.

## 2022-07-22 ENCOUNTER — OFFICE VISIT (OUTPATIENT)
Dept: FAMILY MEDICINE CLINIC | Facility: CLINIC | Age: 47
End: 2022-07-22

## 2022-07-22 VITALS
SYSTOLIC BLOOD PRESSURE: 100 MMHG | OXYGEN SATURATION: 96 % | HEART RATE: 56 BPM | DIASTOLIC BLOOD PRESSURE: 80 MMHG | BODY MASS INDEX: 29.16 KG/M2 | WEIGHT: 175 LBS | HEIGHT: 65 IN | TEMPERATURE: 97.5 F | RESPIRATION RATE: 16 BRPM

## 2022-07-22 DIAGNOSIS — G89.29 CHRONIC PAIN OF RIGHT KNEE: Primary | ICD-10-CM

## 2022-07-22 DIAGNOSIS — E66.09 CLASS 1 OBESITY DUE TO EXCESS CALORIES WITH BODY MASS INDEX (BMI) OF 30.0 TO 30.9 IN ADULT, UNSPECIFIED WHETHER SERIOUS COMORBIDITY PRESENT: ICD-10-CM

## 2022-07-22 DIAGNOSIS — M25.561 CHRONIC PAIN OF RIGHT KNEE: Primary | ICD-10-CM

## 2022-07-22 PROBLEM — Z78.9 GLUTEN FREE DIET: Status: ACTIVE | Noted: 2022-07-13

## 2022-07-22 PROCEDURE — 99213 OFFICE O/P EST LOW 20 MIN: CPT | Performed by: NURSE PRACTITIONER

## 2022-07-22 RX ORDER — PHENTERMINE HYDROCHLORIDE 37.5 MG/1
37.5 CAPSULE ORAL EVERY MORNING
Qty: 30 CAPSULE | Refills: 0 | Status: SHIPPED | OUTPATIENT
Start: 2022-07-22 | End: 2022-08-26 | Stop reason: SDUPTHER

## 2022-07-22 NOTE — PROGRESS NOTES
Chief Complaint  Obesity    Subjective        Angelika Shukla presents to [facility]  Obesity:  Has lost 10 pounds in the last month.  Denies shortness of breath or chest pain.  Goal weight is 150.    Anterior knee pain mild and notes that has been doing more exercising in the pool.          Past History:    Medical History: has a past medical history of Allergies, Anxiety (2014), Asthma, Bowel disease, Broken bones, Crohn disease (HCC), Diverticulitis, Diverticulosis (2010), Inflammatory bowel disease (2010), Irritable bowel syndrome (2010), Knee pain, Labral tear of shoulder, left, initial encounter (10/19/2015), Medial meniscus tear (05/17/2016), Migraines, Other abnormal glucose (ulcer), Patellar instability of right knee (07/29/2016), Peptic ulceration (2005), Shoulder impingement (11/19/2014), Shoulder pain, left (09/16/2015), Shoulder tendonitis (09/24/2014), and Stomach disorder.     Surgical History: has a past surgical history that includes Colonoscopy (2012); Hysterectomy; Knee arthroscopy, medial patello femoral ligament repair; Breast reconstruction (Bilateral); and Shoulder AC Joint Repair.     Family History: family history includes Breast cancer in her maternal grandmother; COPD in her mother; Heart disease in her maternal grandmother; Stroke in her father, maternal grandfather, and maternal grandmother.     Social History: reports that she has never smoked. She has never used smokeless tobacco. She reports that she does not drink alcohol and does not use drugs.    Allergies: Hydrocodone, Cephalosporins, Erythromycin, Hydrocodone-acetaminophen, Semaglutide(0.25 or 0.5mg-dos), and Sulfa antibiotics          Current Outpatient Medications:   •  Bacillus Coagulans-Inulin (Probiotic) 1-250 BILLION-MG capsule, Probiotic 100 billion cell oral capsule take 1 capsule by oral route daily for 30 days   Suspended, Disp: , Rfl:   •  busPIRone (BUSPAR) 30 MG tablet, TAKE 1  TABLET BY MOUTH TWICE A DAY AS DIRECTED, Disp: , Rfl:   •  butalbital-acetaminophen-caffeine (FIORICET, ESGIC) -40 MG per tablet, Take 1 tablet by mouth Every 6 (Six) Hours As Needed for Headache., Disp: 60 tablet, Rfl: 1  •  cyclobenzaprine (FLEXERIL) 10 MG tablet, Take 1 tablet by mouth 3 (Three) Times a Day As Needed for Muscle Spasms., Disp: 60 tablet, Rfl: 1  •  dicyclomine (BENTYL) 20 MG tablet, Take  by mouth., Disp: , Rfl:   •  eszopiclone (LUNESTA) 3 MG tablet, Take 3 mg by mouth every night at bedtime., Disp: , Rfl:   •  hydroCHLOROthiazide (HYDRODIURIL) 12.5 MG tablet, Take 1 tablet by mouth Daily., Disp: 30 tablet, Rfl: 1  •  linaclotide (LINZESS) 290 MCG capsule capsule, Linzess 290 mcg oral capsule take 1 capsule (290 mcg) by oral route once daily on an empty stomach at least 30 minutes before 1st meal of the day for 30 days   Suspended, Disp: , Rfl:   •  LORazepam (ATIVAN) 0.5 MG tablet, TAKE ONE TABLET BY MOUTH TWO TIMES A DAY AS NEEDED FOR SEVERE ANXIETY, Disp: , Rfl:   •  lubiprostone (AMITIZA) 24 MCG capsule, Amitiza 24 mcg oral capsule take 1 capsule (24 mcg) by oral route 2 times per day with food and water for 30 days   Active, Disp: , Rfl:   •  meloxicam (MOBIC) 15 MG tablet, Take 15 mg by mouth Daily., Disp: , Rfl:   •  Meth-Hyo-M Bl-Na Phos-Ph Sal (Uro-MP) 118 MG capsule, TAKE ONE CAPSULE BY MOUTH FOUR TIMES A DAY AS NEEDED FOR 3 DAYS, Disp: , Rfl:   •  Mirabegron ER (MYRBETRIQ) 50 MG tablet sustained-release 24 hour 24 hr tablet, Take 50 mg by mouth Daily., Disp: 90 tablet, Rfl: 1  •  nexIUM 40 MG capsule, TAKE ONE CAPSULE BY MOUTH ONCE DAILY, Disp: 30 capsule, Rfl: 6  •  Nurtec 75 MG dispersible tablet, PLACE 1 TABLET ON TONGUE, ALLOW TO DISSOLVE THEN SWALLOW ONCE AS NEEDED FOR MIGRAINE; MAX 1 DOSE/24 HRS, Disp: 8 tablet, Rfl: 5  •  ondansetron ODT (ZOFRAN-ODT) 4 MG disintegrating tablet, TAKE 1 TABLET BY MOUTH EVERY 8 HOURS IF NEEDED FOR NAUSEA OR VOMITING FOR UP TO 7 DAYS., Disp: ,  "Rfl:   •  PARoxetine (PAXIL) 30 MG tablet, TAKE TWO TABLETS EVERY NIGHT, Disp: , Rfl:   •  phentermine 37.5 MG capsule, Take 1 capsule by mouth Every Morning., Disp: 30 capsule, Rfl: 0  •  propranolol (INDERAL) 10 MG tablet, Take 10 mg by mouth 3 (Three) Times a Day As Needed. for anxiety, Disp: , Rfl:   •  topiramate (TOPAMAX) 50 MG tablet, TAKE 1 TABLET BY MOUTH TWICE A DAY, Disp: 60 tablet, Rfl: 6    There are no discontinued medications.      Review of Systems   Constitutional: Negative for fever.   Respiratory: Negative for cough and shortness of breath.    Cardiovascular: Negative for chest pain, palpitations and leg swelling.   Neurological: Negative for dizziness, weakness, numbness and headache.        Objective         Vitals:    07/22/22 1011   BP: 100/80   BP Location: Right arm   Patient Position: Sitting   Cuff Size: Adult   Pulse: 56   Resp: 16   Temp: 97.5 °F (36.4 °C)   TempSrc: Infrared   SpO2: 96%   Weight: 79.4 kg (175 lb)   Height: 165.1 cm (65\")     Body mass index is 29.12 kg/m².         Physical Exam  Vitals reviewed.   Constitutional:       Appearance: Normal appearance. She is well-developed.   HENT:      Head: Normocephalic and atraumatic.      Mouth/Throat:      Pharynx: No oropharyngeal exudate.   Eyes:      Conjunctiva/sclera: Conjunctivae normal.      Pupils: Pupils are equal, round, and reactive to light.   Cardiovascular:      Rate and Rhythm: Normal rate and regular rhythm.      Heart sounds: Normal heart sounds. No murmur heard.    No friction rub. No gallop.   Pulmonary:      Effort: Pulmonary effort is normal.      Breath sounds: Normal breath sounds. No wheezing or rhonchi.   Skin:     General: Skin is warm and dry.   Neurological:      Mental Status: She is alert and oriented to person, place, and time.   Psychiatric:         Mood and Affect: Mood and affect normal.         Behavior: Behavior normal.         Thought Content: Thought content normal.         Judgment: Judgment " normal.             Result Review :               Assessment and Plan     Diagnoses and all orders for this visit:    1. Chronic pain of right knee (Primary)  Comments:  try exercising and stretches.  Egoscue exercises may help.    2. Class 1 obesity due to excess calories with body mass index (BMI) of 30.0 to 30.9 in adult, unspecified whether serious comorbidity present              Follow Up     Return in about 1 month (around 8/22/2022).    Patient was given instructions and counseling regarding her condition or for health maintenance advice. Please see specific information pulled into the AVS if appropriate.

## 2022-08-26 ENCOUNTER — OFFICE VISIT (OUTPATIENT)
Dept: FAMILY MEDICINE CLINIC | Facility: CLINIC | Age: 47
End: 2022-08-26

## 2022-08-26 VITALS
RESPIRATION RATE: 16 BRPM | BODY MASS INDEX: 30.16 KG/M2 | OXYGEN SATURATION: 99 % | DIASTOLIC BLOOD PRESSURE: 82 MMHG | HEART RATE: 70 BPM | HEIGHT: 65 IN | WEIGHT: 181 LBS | SYSTOLIC BLOOD PRESSURE: 120 MMHG | TEMPERATURE: 97.3 F

## 2022-08-26 DIAGNOSIS — E66.09 CLASS 1 OBESITY DUE TO EXCESS CALORIES WITH BODY MASS INDEX (BMI) OF 30.0 TO 30.9 IN ADULT, UNSPECIFIED WHETHER SERIOUS COMORBIDITY PRESENT: ICD-10-CM

## 2022-08-26 PROCEDURE — 99213 OFFICE O/P EST LOW 20 MIN: CPT | Performed by: NURSE PRACTITIONER

## 2022-08-26 RX ORDER — PHENTERMINE HYDROCHLORIDE 37.5 MG/1
37.5 CAPSULE ORAL EVERY MORNING
Qty: 30 CAPSULE | Refills: 0 | Status: SHIPPED | OUTPATIENT
Start: 2022-08-26 | End: 2022-09-28 | Stop reason: SDUPTHER

## 2022-08-26 NOTE — PROGRESS NOTES
Chief Complaint  Obesity    Subjective        Angelika Shukla presents to [facility]  wAs down 3 pounds but this last week has had a flare of crohn's.  Has had a bowel movement in 3 days.  Abdomen tenderness as well.  Started her medication.  Denies chest pain or shortness of breath.  Blood pressure is within normal limits.        Past History:    Medical History: has a past medical history of Allergies, Anxiety (2014), Asthma, Bowel disease, Broken bones, Crohn disease (HCC), Diverticulitis, Diverticulosis (2010), Inflammatory bowel disease (2010), Irritable bowel syndrome (2010), Knee pain, Labral tear of shoulder, left, initial encounter (10/19/2015), Medial meniscus tear (05/17/2016), Migraines, Other abnormal glucose (ulcer), Patellar instability of right knee (07/29/2016), Peptic ulceration (2005), Shoulder impingement (11/19/2014), Shoulder pain, left (09/16/2015), Shoulder tendonitis (09/24/2014), and Stomach disorder.     Surgical History: has a past surgical history that includes Colonoscopy (2012); Hysterectomy; Knee arthroscopy, medial patello femoral ligament repair; Breast reconstruction (Bilateral); and Shoulder AC Joint Repair.     Family History: family history includes Breast cancer in her maternal grandmother; COPD in her mother; Heart disease in her maternal grandmother; Stroke in her father, maternal grandfather, and maternal grandmother.     Social History: reports that she has never smoked. She has never used smokeless tobacco. She reports that she does not drink alcohol and does not use drugs.    Allergies: Hydrocodone, Cephalosporins, Erythromycin, Hydrocodone-acetaminophen, Semaglutide(0.25 or 0.5mg-dos), and Sulfa antibiotics          Current Outpatient Medications:   •  Bacillus Coagulans-Inulin (Probiotic) 1-250 BILLION-MG capsule, Probiotic 100 billion cell oral capsule take 1 capsule by oral route daily for 30 days   Suspended, Disp: , Rfl:   •   busPIRone (BUSPAR) 30 MG tablet, TAKE 1 TABLET BY MOUTH TWICE A DAY AS DIRECTED, Disp: , Rfl:   •  butalbital-acetaminophen-caffeine (FIORICET, ESGIC) -40 MG per tablet, Take 1 tablet by mouth Every 6 (Six) Hours As Needed for Headache., Disp: 60 tablet, Rfl: 1  •  cyclobenzaprine (FLEXERIL) 10 MG tablet, Take 1 tablet by mouth 3 (Three) Times a Day As Needed for Muscle Spasms., Disp: 60 tablet, Rfl: 1  •  dicyclomine (BENTYL) 20 MG tablet, Take  by mouth., Disp: , Rfl:   •  eszopiclone (LUNESTA) 3 MG tablet, Take 3 mg by mouth every night at bedtime., Disp: , Rfl:   •  hydroCHLOROthiazide (HYDRODIURIL) 12.5 MG tablet, Take 1 tablet by mouth Daily., Disp: 30 tablet, Rfl: 1  •  linaclotide (LINZESS) 290 MCG capsule capsule, Linzess 290 mcg oral capsule take 1 capsule (290 mcg) by oral route once daily on an empty stomach at least 30 minutes before 1st meal of the day for 30 days   Suspended, Disp: , Rfl:   •  LORazepam (ATIVAN) 0.5 MG tablet, TAKE ONE TABLET BY MOUTH TWO TIMES A DAY AS NEEDED FOR SEVERE ANXIETY, Disp: , Rfl:   •  lubiprostone (AMITIZA) 24 MCG capsule, Amitiza 24 mcg oral capsule take 1 capsule (24 mcg) by oral route 2 times per day with food and water for 30 days   Active, Disp: , Rfl:   •  meloxicam (MOBIC) 15 MG tablet, Take 15 mg by mouth Daily., Disp: , Rfl:   •  Meth-Hyo-M Bl-Na Phos-Ph Sal (Uro-MP) 118 MG capsule, TAKE ONE CAPSULE BY MOUTH FOUR TIMES A DAY AS NEEDED FOR 3 DAYS, Disp: , Rfl:   •  Mirabegron ER (MYRBETRIQ) 50 MG tablet sustained-release 24 hour 24 hr tablet, Take 50 mg by mouth Daily., Disp: 90 tablet, Rfl: 1  •  nexIUM 40 MG capsule, TAKE ONE CAPSULE BY MOUTH ONCE DAILY, Disp: 30 capsule, Rfl: 6  •  Nurtec 75 MG dispersible tablet, PLACE 1 TABLET ON TONGUE, ALLOW TO DISSOLVE THEN SWALLOW ONCE AS NEEDED FOR MIGRAINE; MAX 1 DOSE/24 HRS, Disp: 8 tablet, Rfl: 5  •  ondansetron ODT (ZOFRAN-ODT) 4 MG disintegrating tablet, TAKE 1 TABLET BY MOUTH EVERY 8 HOURS IF NEEDED FOR  "NAUSEA OR VOMITING FOR UP TO 7 DAYS., Disp: , Rfl:   •  PARoxetine (PAXIL) 30 MG tablet, TAKE TWO TABLETS EVERY NIGHT, Disp: , Rfl:   •  phentermine 37.5 MG capsule, Take 1 capsule by mouth Every Morning., Disp: 30 capsule, Rfl: 0  •  propranolol (INDERAL) 10 MG tablet, Take 10 mg by mouth 3 (Three) Times a Day As Needed. for anxiety, Disp: , Rfl:   •  topiramate (TOPAMAX) 50 MG tablet, TAKE 1 TABLET BY MOUTH TWICE A DAY, Disp: 60 tablet, Rfl: 6    Medications Discontinued During This Encounter   Medication Reason   • phentermine 37.5 MG capsule Reorder         Review of Systems   Constitutional: Negative for fever.   Respiratory: Negative for cough and shortness of breath.    Cardiovascular: Negative for chest pain, palpitations and leg swelling.   Neurological: Negative for dizziness, weakness, numbness and headache.        Objective         Vitals:    08/26/22 1021   BP: 120/82   BP Location: Right arm   Patient Position: Sitting   Cuff Size: Adult   Pulse: 70   Resp: 16   Temp: 97.3 °F (36.3 °C)   TempSrc: Infrared   SpO2: 99%   Weight: 82.1 kg (181 lb)   Height: 165.1 cm (65\")     Body mass index is 30.12 kg/m².         Physical Exam  Vitals reviewed.   Constitutional:       Appearance: Normal appearance. She is well-developed.   HENT:      Head: Normocephalic and atraumatic.      Mouth/Throat:      Pharynx: No oropharyngeal exudate.   Eyes:      Conjunctiva/sclera: Conjunctivae normal.      Pupils: Pupils are equal, round, and reactive to light.   Cardiovascular:      Rate and Rhythm: Normal rate and regular rhythm.      Heart sounds: Normal heart sounds. No murmur heard.    No friction rub. No gallop.   Pulmonary:      Effort: Pulmonary effort is normal.      Breath sounds: Normal breath sounds. No wheezing or rhonchi.   Skin:     General: Skin is warm and dry.   Neurological:      Mental Status: She is alert and oriented to person, place, and time.   Psychiatric:         Mood and Affect: Mood and affect " normal.         Behavior: Behavior normal.         Thought Content: Thought content normal.         Judgment: Judgment normal.             Result Review :               Assessment and Plan     Diagnoses and all orders for this visit:    1. Class 1 obesity due to excess calories with body mass index (BMI) of 30.0 to 30.9 in adult, unspecified whether serious comorbidity present  -     phentermine 37.5 MG capsule; Take 1 capsule by mouth Every Morning.  Dispense: 30 capsule; Refill: 0              Follow Up     Return in about 1 month (around 9/26/2022).    Patient was given instructions and counseling regarding her condition or for health maintenance advice. Please see specific information pulled into the AVS if appropriate.

## 2022-09-28 ENCOUNTER — OFFICE VISIT (OUTPATIENT)
Dept: FAMILY MEDICINE CLINIC | Facility: CLINIC | Age: 47
End: 2022-09-28

## 2022-09-28 VITALS
RESPIRATION RATE: 16 BRPM | SYSTOLIC BLOOD PRESSURE: 122 MMHG | HEIGHT: 65 IN | OXYGEN SATURATION: 99 % | WEIGHT: 175.2 LBS | TEMPERATURE: 96.3 F | BODY MASS INDEX: 29.19 KG/M2 | DIASTOLIC BLOOD PRESSURE: 80 MMHG | HEART RATE: 87 BPM

## 2022-09-28 DIAGNOSIS — E66.3 OVERWEIGHT WITH BODY MASS INDEX (BMI) OF 29 TO 29.9 IN ADULT: ICD-10-CM

## 2022-09-28 DIAGNOSIS — N39.0 URINARY TRACT INFECTION WITHOUT HEMATURIA, SITE UNSPECIFIED: Primary | ICD-10-CM

## 2022-09-28 LAB
BILIRUB BLD-MCNC: NEGATIVE MG/DL
CLARITY, POC: CLEAR
COLOR UR: YELLOW
EXPIRATION DATE: ABNORMAL
GLUCOSE UR STRIP-MCNC: NEGATIVE MG/DL
KETONES UR QL: NEGATIVE
LEUKOCYTE EST, POC: ABNORMAL
Lab: ABNORMAL
NITRITE UR-MCNC: NEGATIVE MG/ML
PH UR: 5.5 [PH] (ref 5–8)
PROT UR STRIP-MCNC: NEGATIVE MG/DL
RBC # UR STRIP: NEGATIVE /UL
SP GR UR: 1.03 (ref 1–1.03)
UROBILINOGEN UR QL: NORMAL

## 2022-09-28 PROCEDURE — 99213 OFFICE O/P EST LOW 20 MIN: CPT | Performed by: NURSE PRACTITIONER

## 2022-09-28 PROCEDURE — 87086 URINE CULTURE/COLONY COUNT: CPT | Performed by: NURSE PRACTITIONER

## 2022-09-28 PROCEDURE — 81003 URINALYSIS AUTO W/O SCOPE: CPT | Performed by: NURSE PRACTITIONER

## 2022-09-28 RX ORDER — PHENTERMINE HYDROCHLORIDE 37.5 MG/1
37.5 CAPSULE ORAL EVERY MORNING
Qty: 30 CAPSULE | Refills: 0 | Status: SHIPPED | OUTPATIENT
Start: 2022-09-28 | End: 2022-11-03 | Stop reason: SDUPTHER

## 2022-09-28 RX ORDER — NITROFURANTOIN 25; 75 MG/1; MG/1
100 CAPSULE ORAL 2 TIMES DAILY
Qty: 14 CAPSULE | Refills: 0 | Status: SHIPPED | OUTPATIENT
Start: 2022-09-28 | End: 2022-09-29 | Stop reason: SDUPTHER

## 2022-09-28 RX ORDER — HYDROXYZINE 50 MG/1
TABLET, FILM COATED ORAL
COMMUNITY
Start: 2022-09-02

## 2022-09-28 RX ORDER — PHENAZOPYRIDINE HYDROCHLORIDE 100 MG/1
100 TABLET, FILM COATED ORAL 3 TIMES DAILY PRN
Qty: 10 TABLET | Refills: 0 | Status: SHIPPED | OUTPATIENT
Start: 2022-09-28 | End: 2022-09-29 | Stop reason: SDUPTHER

## 2022-09-28 NOTE — PROGRESS NOTES
Chief Complaint  Obesity and Urinary Tract Infection    Subjective        Angelika Shukla presents to [facility]  History of Present Illness  Obesity:  Has lost 6 pounds in the last month.  Denies chest pain and shortness of breath.    UTI symptoms.  Burning with urination.  And frequency and urgency  Obesity  Pertinent negatives include no chest pain, coughing, fever, numbness or weakness.   Urinary Tract Infection           Past History:    Medical History: has a past medical history of Allergies, Anxiety (2014), Asthma, Bowel disease, Broken bones, Crohn disease (HCC), Diverticulitis, Diverticulosis (2010), Inflammatory bowel disease (2010), Irritable bowel syndrome (2010), Knee pain, Labral tear of shoulder, left, initial encounter (10/19/2015), Medial meniscus tear (05/17/2016), Migraines, Other abnormal glucose (ulcer), Patellar instability of right knee (07/29/2016), Peptic ulceration (2005), Shoulder impingement (11/19/2014), Shoulder pain, left (09/16/2015), Shoulder tendonitis (09/24/2014), and Stomach disorder.     Surgical History: has a past surgical history that includes Colonoscopy (2012); Hysterectomy; Knee arthroscopy, medial patello femoral ligament repair; Breast reconstruction (Bilateral); and Shoulder AC Joint Repair.     Family History: family history includes Breast cancer in her maternal grandmother; COPD in her mother; Heart disease in her maternal grandmother; Stroke in her father, maternal grandfather, and maternal grandmother.     Social History: reports that she has never smoked. She has never used smokeless tobacco. She reports that she does not drink alcohol and does not use drugs.    Allergies: Hydrocodone, Cephalosporins, Erythromycin, Hydrocodone-acetaminophen, Semaglutide(0.25 or 0.5mg-dos), and Sulfa antibiotics          Current Outpatient Medications:   •  Bacillus Coagulans-Inulin (Probiotic) 1-250 BILLION-MG capsule, Probiotic 100 billion cell  oral capsule take 1 capsule by oral route daily for 30 days   Suspended, Disp: , Rfl:   •  busPIRone (BUSPAR) 30 MG tablet, TAKE 1 TABLET BY MOUTH TWICE A DAY AS DIRECTED, Disp: , Rfl:   •  butalbital-acetaminophen-caffeine (FIORICET, ESGIC) -40 MG per tablet, Take 1 tablet by mouth Every 6 (Six) Hours As Needed for Headache., Disp: 60 tablet, Rfl: 1  •  cyclobenzaprine (FLEXERIL) 10 MG tablet, Take 1 tablet by mouth 3 (Three) Times a Day As Needed for Muscle Spasms., Disp: 60 tablet, Rfl: 1  •  dicyclomine (BENTYL) 20 MG tablet, Take  by mouth., Disp: , Rfl:   •  eszopiclone (LUNESTA) 3 MG tablet, Take 3 mg by mouth every night at bedtime., Disp: , Rfl:   •  hydroCHLOROthiazide (HYDRODIURIL) 12.5 MG tablet, Take 1 tablet by mouth Daily., Disp: 30 tablet, Rfl: 1  •  hydrOXYzine (ATARAX) 50 MG tablet, TAKE ONE TABLET BY MOUTH THREE TIMES PER DAY AS NEEDED FOR ANXIETY, Disp: , Rfl:   •  linaclotide (LINZESS) 290 MCG capsule capsule, Linzess 290 mcg oral capsule take 1 capsule (290 mcg) by oral route once daily on an empty stomach at least 30 minutes before 1st meal of the day for 30 days   Suspended, Disp: , Rfl:   •  LORazepam (ATIVAN) 0.5 MG tablet, TAKE ONE TABLET BY MOUTH TWO TIMES A DAY AS NEEDED FOR SEVERE ANXIETY, Disp: , Rfl:   •  lubiprostone (AMITIZA) 24 MCG capsule, Amitiza 24 mcg oral capsule take 1 capsule (24 mcg) by oral route 2 times per day with food and water for 30 days   Active, Disp: , Rfl:   •  meloxicam (MOBIC) 15 MG tablet, Take 15 mg by mouth Daily., Disp: , Rfl:   •  Meth-Hyo-M Bl-Na Phos-Ph Sal (Uro-MP) 118 MG capsule, TAKE ONE CAPSULE BY MOUTH FOUR TIMES A DAY AS NEEDED FOR 3 DAYS, Disp: , Rfl:   •  Mirabegron ER (MYRBETRIQ) 50 MG tablet sustained-release 24 hour 24 hr tablet, Take 50 mg by mouth Daily., Disp: 90 tablet, Rfl: 1  •  nexIUM 40 MG capsule, TAKE ONE CAPSULE BY MOUTH ONCE DAILY, Disp: 30 capsule, Rfl: 6  •  Nurtec 75 MG dispersible tablet, PLACE 1 TABLET ON TONGUE, ALLOW  "TO DISSOLVE THEN SWALLOW ONCE AS NEEDED FOR MIGRAINE; MAX 1 DOSE/24 HRS, Disp: 8 tablet, Rfl: 5  •  ondansetron ODT (ZOFRAN-ODT) 4 MG disintegrating tablet, TAKE 1 TABLET BY MOUTH EVERY 8 HOURS IF NEEDED FOR NAUSEA OR VOMITING FOR UP TO 7 DAYS., Disp: , Rfl:   •  PARoxetine (PAXIL) 30 MG tablet, TAKE TWO TABLETS EVERY NIGHT, Disp: , Rfl:   •  phentermine 37.5 MG capsule, Take 1 capsule by mouth Every Morning., Disp: 30 capsule, Rfl: 0  •  propranolol (INDERAL) 10 MG tablet, Take 10 mg by mouth 3 (Three) Times a Day As Needed. for anxiety, Disp: , Rfl:   •  topiramate (TOPAMAX) 50 MG tablet, TAKE 1 TABLET BY MOUTH TWICE A DAY, Disp: 60 tablet, Rfl: 6  •  nitrofurantoin, macrocrystal-monohydrate, (Macrobid) 100 MG capsule, Take 1 capsule by mouth 2 (Two) Times a Day., Disp: 14 capsule, Rfl: 0    There are no discontinued medications.      Review of Systems   Constitutional: Negative for fever.   Respiratory: Negative for cough and shortness of breath.    Cardiovascular: Negative for chest pain, palpitations and leg swelling.   Neurological: Negative for dizziness, weakness, numbness and headache.        Objective         Vitals:    09/28/22 1009   BP: 122/80   BP Location: Right arm   Patient Position: Sitting   Cuff Size: Adult   Pulse: 87   Resp: 16   Temp: 96.3 °F (35.7 °C)   TempSrc: Infrared   SpO2: 99%   Weight: 79.5 kg (175 lb 3.2 oz)   Height: 165.1 cm (65\")     Body mass index is 29.15 kg/m².         Physical Exam  Vitals reviewed.   Constitutional:       Appearance: Normal appearance. She is well-developed.   HENT:      Head: Normocephalic and atraumatic.      Mouth/Throat:      Pharynx: No oropharyngeal exudate.   Eyes:      Conjunctiva/sclera: Conjunctivae normal.      Pupils: Pupils are equal, round, and reactive to light.   Cardiovascular:      Rate and Rhythm: Normal rate and regular rhythm.      Heart sounds: Normal heart sounds. No murmur heard.    No friction rub. No gallop.   Pulmonary:      Effort: " Pulmonary effort is normal.      Breath sounds: Normal breath sounds. No wheezing or rhonchi.   Skin:     General: Skin is warm and dry.   Neurological:      Mental Status: She is alert and oriented to person, place, and time.   Psychiatric:         Mood and Affect: Mood and affect normal.         Behavior: Behavior normal.         Thought Content: Thought content normal.         Judgment: Judgment normal.             Result Review :               Assessment and Plan     Diagnoses and all orders for this visit:    1. Urinary tract infection without hematuria, site unspecified (Primary)  -     Urine Culture - Urine, Urine, Clean Catch; Future  -     POC Urinalysis Dipstick, Automated  -     nitrofurantoin, macrocrystal-monohydrate, (Macrobid) 100 MG capsule; Take 1 capsule by mouth 2 (Two) Times a Day.  Dispense: 14 capsule; Refill: 0    2. Overweight with body mass index (BMI) of 29 to 29.9 in adult              Follow Up     Return in about 1 month (around 10/28/2022).    Patient was given instructions and counseling regarding her condition or for health maintenance advice. Please see specific information pulled into the AVS if appropriate.

## 2022-09-29 DIAGNOSIS — N39.0 URINARY TRACT INFECTION WITHOUT HEMATURIA, SITE UNSPECIFIED: ICD-10-CM

## 2022-09-29 LAB — BACTERIA SPEC AEROBE CULT: NO GROWTH

## 2022-09-29 RX ORDER — PHENAZOPYRIDINE HYDROCHLORIDE 100 MG/1
100 TABLET, FILM COATED ORAL 3 TIMES DAILY PRN
Qty: 10 TABLET | Refills: 0 | Status: SHIPPED | OUTPATIENT
Start: 2022-09-29

## 2022-09-29 RX ORDER — NITROFURANTOIN 25; 75 MG/1; MG/1
100 CAPSULE ORAL 2 TIMES DAILY
Qty: 14 CAPSULE | Refills: 0 | Status: SHIPPED | OUTPATIENT
Start: 2022-09-29 | End: 2023-01-13

## 2022-09-29 NOTE — TELEPHONE ENCOUNTER
Medication sent to [place] [facility] instead of [place] [facility]    Patient is there to pick these up - resent

## 2022-11-03 ENCOUNTER — OFFICE VISIT (OUTPATIENT)
Dept: FAMILY MEDICINE CLINIC | Facility: CLINIC | Age: 47
End: 2022-11-03

## 2022-11-03 VITALS
TEMPERATURE: 97.5 F | HEART RATE: 76 BPM | OXYGEN SATURATION: 100 % | SYSTOLIC BLOOD PRESSURE: 116 MMHG | DIASTOLIC BLOOD PRESSURE: 74 MMHG | BODY MASS INDEX: 28.89 KG/M2 | HEIGHT: 65 IN | RESPIRATION RATE: 16 BRPM | WEIGHT: 173.4 LBS

## 2022-11-03 DIAGNOSIS — E66.3 OVERWEIGHT WITH BODY MASS INDEX (BMI) OF 28 TO 28.9 IN ADULT: ICD-10-CM

## 2022-11-03 DIAGNOSIS — H10.9 CONJUNCTIVITIS OF LEFT EYE, UNSPECIFIED CONJUNCTIVITIS TYPE: Primary | ICD-10-CM

## 2022-11-03 PROBLEM — K57.30 DIVERTICULOSIS OF COLON: Status: ACTIVE | Noted: 2022-11-02

## 2022-11-03 PROCEDURE — 99213 OFFICE O/P EST LOW 20 MIN: CPT | Performed by: NURSE PRACTITIONER

## 2022-11-03 RX ORDER — PHENTERMINE HYDROCHLORIDE 37.5 MG/1
37.5 CAPSULE ORAL EVERY MORNING
Qty: 30 CAPSULE | Refills: 0 | Status: SHIPPED | OUTPATIENT
Start: 2022-11-03 | End: 2022-11-03

## 2022-11-03 RX ORDER — PHENTERMINE HYDROCHLORIDE 37.5 MG/1
37.5 CAPSULE ORAL EVERY MORNING
Qty: 30 CAPSULE | Refills: 0 | Status: SHIPPED | OUTPATIENT
Start: 2022-11-03 | End: 2022-12-09 | Stop reason: SDUPTHER

## 2022-11-03 RX ORDER — CIPROFLOXACIN HYDROCHLORIDE 3.5 MG/ML
1 SOLUTION/ DROPS TOPICAL
Qty: 10 ML | Refills: 0 | Status: SHIPPED | OUTPATIENT
Start: 2022-11-03 | End: 2022-11-07 | Stop reason: SDUPTHER

## 2022-11-03 NOTE — PROGRESS NOTES
Chief Complaint  Obesity and Eye Problem    Subjective        Angelika Shukla presents to [facility]  History of Present Illness  Has only lost 2 pounds in the last month.  Has recently been in a wedding for son.  Denies chest pain or shortness of breath.    Eye problem:  States feels like has left eye drainage creamy in color.  Grandchild with pink eye and it has been itching.    Obesity  Pertinent negatives include no chest pain, coughing, fever, numbness or weakness.   Eye Problem   Pertinent negatives include no fever or weakness.         Past History:    Medical History: has a past medical history of Allergies, Anxiety (2014), Asthma, Bowel disease, Broken bones, Crohn disease (HCC), Diverticulitis, Diverticulosis (2010), Inflammatory bowel disease (2010), Irritable bowel syndrome (2010), Knee pain, Labral tear of shoulder, left, initial encounter (10/19/2015), Medial meniscus tear (05/17/2016), Migraines, Other abnormal glucose (ulcer), Patellar instability of right knee (07/29/2016), Peptic ulceration (2005), Shoulder impingement (11/19/2014), Shoulder pain, left (09/16/2015), Shoulder tendonitis (09/24/2014), and Stomach disorder.     Surgical History: has a past surgical history that includes Colonoscopy (2012); Hysterectomy; Knee arthroscopy, medial patello femoral ligament repair; Breast reconstruction (Bilateral); and Shoulder AC Joint Repair.     Family History: family history includes Breast cancer in her maternal grandmother; COPD in her mother; Heart disease in her maternal grandmother; Stroke in her father, maternal grandfather, and maternal grandmother.     Social History: reports that she has never smoked. She has never used smokeless tobacco. She reports that she does not drink alcohol and does not use drugs.    Allergies: Hydrocodone, Cephalosporins, Erythromycin, Hydrocodone-acetaminophen, Semaglutide(0.25 or 0.5mg-dos), and Sulfa antibiotics          Current  Outpatient Medications:   •  Bacillus Coagulans-Inulin (Probiotic) 1-250 BILLION-MG capsule, Probiotic 100 billion cell oral capsule take 1 capsule by oral route daily for 30 days   Suspended, Disp: , Rfl:   •  busPIRone (BUSPAR) 30 MG tablet, TAKE 1 TABLET BY MOUTH TWICE A DAY AS DIRECTED, Disp: , Rfl:   •  butalbital-acetaminophen-caffeine (FIORICET, ESGIC) -40 MG per tablet, Take 1 tablet by mouth Every 6 (Six) Hours As Needed for Headache., Disp: 60 tablet, Rfl: 1  •  cyclobenzaprine (FLEXERIL) 10 MG tablet, Take 1 tablet by mouth 3 (Three) Times a Day As Needed for Muscle Spasms., Disp: 60 tablet, Rfl: 1  •  dicyclomine (BENTYL) 20 MG tablet, Take  by mouth., Disp: , Rfl:   •  eszopiclone (LUNESTA) 3 MG tablet, Take 3 mg by mouth every night at bedtime., Disp: , Rfl:   •  hydroCHLOROthiazide (HYDRODIURIL) 12.5 MG tablet, Take 1 tablet by mouth Daily., Disp: 30 tablet, Rfl: 1  •  hydrOXYzine (ATARAX) 50 MG tablet, TAKE ONE TABLET BY MOUTH THREE TIMES PER DAY AS NEEDED FOR ANXIETY, Disp: , Rfl:   •  linaclotide (LINZESS) 290 MCG capsule capsule, Linzess 290 mcg oral capsule take 1 capsule (290 mcg) by oral route once daily on an empty stomach at least 30 minutes before 1st meal of the day for 30 days   Suspended, Disp: , Rfl:   •  LORazepam (ATIVAN) 0.5 MG tablet, TAKE ONE TABLET BY MOUTH TWO TIMES A DAY AS NEEDED FOR SEVERE ANXIETY, Disp: , Rfl:   •  lubiprostone (AMITIZA) 24 MCG capsule, Amitiza 24 mcg oral capsule take 1 capsule (24 mcg) by oral route 2 times per day with food and water for 30 days   Active, Disp: , Rfl:   •  meloxicam (MOBIC) 15 MG tablet, Take 15 mg by mouth Daily., Disp: , Rfl:   •  Meth-Hyo-M Bl-Na Phos-Ph Sal (Uro-MP) 118 MG capsule, TAKE ONE CAPSULE BY MOUTH FOUR TIMES A DAY AS NEEDED FOR 3 DAYS, Disp: , Rfl:   •  Mirabegron ER (MYRBETRIQ) 50 MG tablet sustained-release 24 hour 24 hr tablet, Take 50 mg by mouth Daily., Disp: 90 tablet, Rfl: 1  •  nexIUM 40 MG capsule, TAKE ONE  "CAPSULE BY MOUTH ONCE DAILY, Disp: 30 capsule, Rfl: 6  •  nitrofurantoin, macrocrystal-monohydrate, (Macrobid) 100 MG capsule, Take 1 capsule by mouth 2 (Two) Times a Day., Disp: 14 capsule, Rfl: 0  •  Nurtec 75 MG dispersible tablet, PLACE 1 TABLET ON TONGUE, ALLOW TO DISSOLVE THEN SWALLOW ONCE AS NEEDED FOR MIGRAINE; MAX 1 DOSE/24 HRS, Disp: 8 tablet, Rfl: 5  •  ondansetron ODT (ZOFRAN-ODT) 4 MG disintegrating tablet, TAKE 1 TABLET BY MOUTH EVERY 8 HOURS IF NEEDED FOR NAUSEA OR VOMITING FOR UP TO 7 DAYS., Disp: , Rfl:   •  PARoxetine (PAXIL) 30 MG tablet, TAKE TWO TABLETS EVERY NIGHT, Disp: , Rfl:   •  phenazopyridine (Pyridium) 100 MG tablet, Take 1 tablet by mouth 3 (Three) Times a Day As Needed for Bladder Spasms., Disp: 10 tablet, Rfl: 0  •  phentermine 37.5 MG capsule, Take 1 capsule by mouth Every Morning., Disp: 30 capsule, Rfl: 0  •  propranolol (INDERAL) 10 MG tablet, Take 10 mg by mouth 3 (Three) Times a Day As Needed. for anxiety, Disp: , Rfl:   •  topiramate (TOPAMAX) 50 MG tablet, TAKE 1 TABLET BY MOUTH TWICE A DAY, Disp: 60 tablet, Rfl: 6    There are no discontinued medications.      Review of Systems   Constitutional: Negative for fever.   Respiratory: Negative for cough and shortness of breath.    Cardiovascular: Negative for chest pain, palpitations and leg swelling.   Neurological: Negative for dizziness, weakness, numbness and headache.        Objective         Vitals:    11/03/22 1058 11/03/22 1155   BP: 116/74    BP Location: Right arm    Patient Position: Sitting    Cuff Size: Adult    Pulse: 76    Resp: (!) 6 16   Temp: 97.5 °F (36.4 °C)    TempSrc: Temporal    SpO2: 100%    Weight: 78.7 kg (173 lb 6.4 oz)    Height: 165.1 cm (65\")      Body mass index is 28.86 kg/m².         Physical Exam  Vitals reviewed.   Constitutional:       Appearance: Normal appearance. She is well-developed.   HENT:      Head: Normocephalic and atraumatic.      Mouth/Throat:      Pharynx: No oropharyngeal exudate. "   Eyes:      Conjunctiva/sclera: Conjunctivae normal.      Pupils: Pupils are equal, round, and reactive to light.   Cardiovascular:      Rate and Rhythm: Normal rate and regular rhythm.      Heart sounds: Normal heart sounds. No murmur heard.    No friction rub. No gallop.   Pulmonary:      Effort: Pulmonary effort is normal.      Breath sounds: Normal breath sounds. No wheezing or rhonchi.   Skin:     General: Skin is warm and dry.   Neurological:      Mental Status: She is alert and oriented to person, place, and time.   Psychiatric:         Mood and Affect: Mood and affect normal.         Behavior: Behavior normal.         Thought Content: Thought content normal.         Judgment: Judgment normal.             Result Review :               Assessment and Plan     Diagnoses and all orders for this visit:    1. Conjunctivitis of left eye, unspecified conjunctivitis type (Primary)    2. Overweight with body mass index (BMI) of 28 to 28.9 in adult              Follow Up     Return in about 1 month (around 12/3/2022).    Patient was given instructions and counseling regarding her condition or for health maintenance advice. Please see specific information pulled into the AVS if appropriate.

## 2022-11-07 DIAGNOSIS — H10.9 CONJUNCTIVITIS OF LEFT EYE, UNSPECIFIED CONJUNCTIVITIS TYPE: ICD-10-CM

## 2022-11-07 RX ORDER — CIPROFLOXACIN HYDROCHLORIDE 3.5 MG/ML
1 SOLUTION/ DROPS TOPICAL
Qty: 10 ML | Refills: 0 | Status: SHIPPED | OUTPATIENT
Start: 2022-11-07

## 2022-12-09 ENCOUNTER — OFFICE VISIT (OUTPATIENT)
Dept: FAMILY MEDICINE CLINIC | Facility: CLINIC | Age: 47
End: 2022-12-09

## 2022-12-09 VITALS
BODY MASS INDEX: 28.06 KG/M2 | HEIGHT: 65 IN | WEIGHT: 168.4 LBS | HEART RATE: 76 BPM | OXYGEN SATURATION: 98 % | DIASTOLIC BLOOD PRESSURE: 70 MMHG | SYSTOLIC BLOOD PRESSURE: 104 MMHG | TEMPERATURE: 96.7 F

## 2022-12-09 DIAGNOSIS — E66.3 OVERWEIGHT WITH BODY MASS INDEX (BMI) OF 28 TO 28.9 IN ADULT: ICD-10-CM

## 2022-12-09 PROCEDURE — 99213 OFFICE O/P EST LOW 20 MIN: CPT | Performed by: NURSE PRACTITIONER

## 2022-12-09 RX ORDER — PHENTERMINE HYDROCHLORIDE 37.5 MG/1
37.5 CAPSULE ORAL EVERY MORNING
Qty: 30 CAPSULE | Refills: 0 | Status: SHIPPED | OUTPATIENT
Start: 2022-12-09 | End: 2023-01-13 | Stop reason: SDUPTHER

## 2022-12-09 RX ORDER — TOPIRAMATE 50 MG/1
TABLET, FILM COATED ORAL
Qty: 60 TABLET | Refills: 6 | Status: SHIPPED | OUTPATIENT
Start: 2022-12-09

## 2022-12-09 NOTE — PROGRESS NOTES
Chief Complaint  Obesity (1 month follow up)    Subjective        Angelika Shukla presents to [facility]  History of Present Illness  Obesity:  Has lost 5 pounds and denies shortness of breath and chest pain.  Was told she had the flu last week but didn't have a test because the urgent care didn't have any in stock.  Grandson was sick recently as well.        Past History:    Medical History: has a past medical history of Allergies, Anxiety (2014), Asthma, Bowel disease, Broken bones, Crohn disease (HCC), Diverticulitis, Diverticulosis (2010), Inflammatory bowel disease (2010), Irritable bowel syndrome (2010), Knee pain, Labral tear of shoulder, left, initial encounter (10/19/2015), Medial meniscus tear (05/17/2016), Migraines, Other abnormal glucose (ulcer), Patellar instability of right knee (07/29/2016), Peptic ulceration (2005), Shoulder impingement (11/19/2014), Shoulder pain, left (09/16/2015), Shoulder tendonitis (09/24/2014), and Stomach disorder.     Surgical History: has a past surgical history that includes Colonoscopy (2012); Hysterectomy; Knee arthroscopy, medial patello femoral ligament repair; Breast reconstruction (Bilateral); and Shoulder AC Joint Repair.     Family History: family history includes Breast cancer in her maternal grandmother; COPD in her mother; Heart disease in her maternal grandmother; Stroke in her father, maternal grandfather, and maternal grandmother.     Social History: reports that she has never smoked. She has never used smokeless tobacco. She reports that she does not drink alcohol and does not use drugs.    Allergies: Hydrocodone, Cephalosporins, Erythromycin, Hydrocodone-acetaminophen, Semaglutide(0.25 or 0.5mg-dos), and Sulfa antibiotics          Current Outpatient Medications:   •  Bacillus Coagulans-Inulin (Probiotic) 1-250 BILLION-MG capsule, Probiotic 100 billion cell oral capsule take 1 capsule by oral route daily for 30 days    Suspended, Disp: , Rfl:   •  busPIRone (BUSPAR) 30 MG tablet, TAKE 1 TABLET BY MOUTH TWICE A DAY AS DIRECTED, Disp: , Rfl:   •  butalbital-acetaminophen-caffeine (FIORICET, ESGIC) -40 MG per tablet, Take 1 tablet by mouth Every 6 (Six) Hours As Needed for Headache., Disp: 60 tablet, Rfl: 1  •  ciprofloxacin (Ciloxan) 0.3 % ophthalmic solution, Apply 1 drop to eye(s) as directed by provider 6 (Six) Times a Day., Disp: 10 mL, Rfl: 0  •  cyclobenzaprine (FLEXERIL) 10 MG tablet, Take 1 tablet by mouth 3 (Three) Times a Day As Needed for Muscle Spasms., Disp: 60 tablet, Rfl: 1  •  dicyclomine (BENTYL) 20 MG tablet, Take  by mouth., Disp: , Rfl:   •  eszopiclone (LUNESTA) 3 MG tablet, Take 3 mg by mouth every night at bedtime., Disp: , Rfl:   •  hydroCHLOROthiazide (HYDRODIURIL) 12.5 MG tablet, Take 1 tablet by mouth Daily., Disp: 30 tablet, Rfl: 1  •  hydrOXYzine (ATARAX) 50 MG tablet, TAKE ONE TABLET BY MOUTH THREE TIMES PER DAY AS NEEDED FOR ANXIETY, Disp: , Rfl:   •  linaclotide (LINZESS) 290 MCG capsule capsule, Linzess 290 mcg oral capsule take 1 capsule (290 mcg) by oral route once daily on an empty stomach at least 30 minutes before 1st meal of the day for 30 days   Suspended, Disp: , Rfl:   •  LORazepam (ATIVAN) 0.5 MG tablet, TAKE ONE TABLET BY MOUTH TWO TIMES A DAY AS NEEDED FOR SEVERE ANXIETY, Disp: , Rfl:   •  lubiprostone (AMITIZA) 24 MCG capsule, Amitiza 24 mcg oral capsule take 1 capsule (24 mcg) by oral route 2 times per day with food and water for 30 days   Active, Disp: , Rfl:   •  meloxicam (MOBIC) 15 MG tablet, Take 15 mg by mouth Daily., Disp: , Rfl:   •  Meth-Hyo-M Bl-Na Phos-Ph Sal (Uro-MP) 118 MG capsule, TAKE ONE CAPSULE BY MOUTH FOUR TIMES A DAY AS NEEDED FOR 3 DAYS, Disp: , Rfl:   •  Mirabegron ER (MYRBETRIQ) 50 MG tablet sustained-release 24 hour 24 hr tablet, Take 50 mg by mouth Daily., Disp: 90 tablet, Rfl: 1  •  nexIUM 40 MG capsule, TAKE ONE CAPSULE BY MOUTH ONCE DAILY, Disp: 30  "capsule, Rfl: 6  •  nitrofurantoin, macrocrystal-monohydrate, (Macrobid) 100 MG capsule, Take 1 capsule by mouth 2 (Two) Times a Day., Disp: 14 capsule, Rfl: 0  •  Nurtec 75 MG dispersible tablet, PLACE 1 TABLET ON TONGUE, ALLOW TO DISSOLVE THEN SWALLOW ONCE AS NEEDED FOR MIGRAINE; MAX 1 DOSE/24 HRS, Disp: 8 tablet, Rfl: 5  •  ondansetron ODT (ZOFRAN-ODT) 4 MG disintegrating tablet, TAKE 1 TABLET BY MOUTH EVERY 8 HOURS IF NEEDED FOR NAUSEA OR VOMITING FOR UP TO 7 DAYS., Disp: , Rfl:   •  PARoxetine (PAXIL) 30 MG tablet, TAKE TWO TABLETS EVERY NIGHT, Disp: , Rfl:   •  phenazopyridine (Pyridium) 100 MG tablet, Take 1 tablet by mouth 3 (Three) Times a Day As Needed for Bladder Spasms., Disp: 10 tablet, Rfl: 0  •  phentermine 37.5 MG capsule, Take 1 capsule by mouth Every Morning., Disp: 30 capsule, Rfl: 0  •  propranolol (INDERAL) 10 MG tablet, Take 10 mg by mouth 3 (Three) Times a Day As Needed. for anxiety, Disp: , Rfl:   •  topiramate (TOPAMAX) 50 MG tablet, TAKE 1 TABLET BY MOUTH TWICE A DAY, Disp: 60 tablet, Rfl: 6    Medications Discontinued During This Encounter   Medication Reason   • phentermine 37.5 MG capsule Reorder         Review of Systems   Constitutional: Negative for fever.   Respiratory: Negative for cough and shortness of breath.    Cardiovascular: Negative for chest pain, palpitations and leg swelling.   Neurological: Negative for dizziness, weakness, numbness and headache.        Objective         Vitals:    12/09/22 0926   BP: 104/70   BP Location: Right arm   Patient Position: Sitting   Cuff Size: Adult   Pulse: 76   Temp: 96.7 °F (35.9 °C)   TempSrc: Temporal   SpO2: 98%   Weight: 76.4 kg (168 lb 6.4 oz)   Height: 165.1 cm (65\")     Body mass index is 28.02 kg/m².         Physical Exam  Vitals reviewed.   Constitutional:       Appearance: Normal appearance. She is well-developed.   HENT:      Head: Normocephalic and atraumatic.      Mouth/Throat:      Pharynx: No oropharyngeal exudate.   Eyes: "      Conjunctiva/sclera: Conjunctivae normal.      Pupils: Pupils are equal, round, and reactive to light.   Cardiovascular:      Rate and Rhythm: Normal rate and regular rhythm.      Heart sounds: Normal heart sounds. No murmur heard.    No friction rub. No gallop.   Pulmonary:      Effort: Pulmonary effort is normal.      Breath sounds: Normal breath sounds. No wheezing or rhonchi.   Skin:     General: Skin is warm and dry.   Neurological:      Mental Status: She is alert and oriented to person, place, and time.   Psychiatric:         Mood and Affect: Mood and affect normal.         Behavior: Behavior normal.         Thought Content: Thought content normal.         Judgment: Judgment normal.             Result Review :               Assessment and Plan     Diagnoses and all orders for this visit:    1. Overweight with body mass index (BMI) of 28 to 28.9 in adult  -     phentermine 37.5 MG capsule; Take 1 capsule by mouth Every Morning.  Dispense: 30 capsule; Refill: 0              Follow Up     Return in about 1 month (around 1/9/2023).    Patient was given instructions and counseling regarding her condition or for health maintenance advice. Please see specific information pulled into the AVS if appropriate.

## 2023-01-13 ENCOUNTER — OFFICE VISIT (OUTPATIENT)
Dept: FAMILY MEDICINE CLINIC | Facility: CLINIC | Age: 48
End: 2023-01-13
Payer: COMMERCIAL

## 2023-01-13 VITALS
WEIGHT: 172.8 LBS | HEIGHT: 65 IN | BODY MASS INDEX: 28.79 KG/M2 | RESPIRATION RATE: 16 BRPM | OXYGEN SATURATION: 100 % | TEMPERATURE: 96 F | HEART RATE: 62 BPM | SYSTOLIC BLOOD PRESSURE: 102 MMHG | DIASTOLIC BLOOD PRESSURE: 76 MMHG

## 2023-01-13 DIAGNOSIS — G43.009 MIGRAINE WITHOUT AURA AND WITHOUT STATUS MIGRAINOSUS, NOT INTRACTABLE: ICD-10-CM

## 2023-01-13 DIAGNOSIS — E66.3 OVERWEIGHT WITH BODY MASS INDEX (BMI) OF 28 TO 28.9 IN ADULT: ICD-10-CM

## 2023-01-13 DIAGNOSIS — M54.50 ACUTE LOW BACK PAIN WITHOUT SCIATICA, UNSPECIFIED BACK PAIN LATERALITY: Primary | ICD-10-CM

## 2023-01-13 PROCEDURE — 99213 OFFICE O/P EST LOW 20 MIN: CPT | Performed by: NURSE PRACTITIONER

## 2023-01-13 RX ORDER — BUTALBITAL, ACETAMINOPHEN AND CAFFEINE 50; 325; 40 MG/1; MG/1; MG/1
1 TABLET ORAL EVERY 6 HOURS PRN
Qty: 120 TABLET | Refills: 0 | Status: SHIPPED | OUTPATIENT
Start: 2023-01-13

## 2023-01-13 RX ORDER — PHENTERMINE HYDROCHLORIDE 37.5 MG/1
37.5 CAPSULE ORAL EVERY MORNING
Qty: 30 CAPSULE | Refills: 0 | Status: SHIPPED | OUTPATIENT
Start: 2023-01-13 | End: 2023-02-17 | Stop reason: SDUPTHER

## 2023-01-13 RX ORDER — TRAMADOL HYDROCHLORIDE 50 MG/1
50 TABLET ORAL EVERY 6 HOURS PRN
Qty: 30 TABLET | Refills: 0 | Status: SHIPPED | OUTPATIENT
Start: 2023-01-13

## 2023-01-13 NOTE — PROGRESS NOTES
Chief Complaint  Obesity and Headache (Needs a refill of fioricet)    Subjective        Angelika Shukla presents to [facility]  History of Present Illness  Ate a lot of food at [employer] with foods she only gets twice a year.  Has gained 4 pounds but doesn't have chest pain or shortness of breath.  States had gained 7 pounds.    Headaches:  Needs a refill of fioricet.     Has tweaked back and would like a refill of tramadol.        Past History:    Medical History: has a past medical history of Allergies, Anxiety (2014), Asthma, Bowel disease, Broken bones, Crohn disease (HCC), Diverticulitis, Diverticulosis (2010), Inflammatory bowel disease (2010), Irritable bowel syndrome (2010), Knee pain, Labral tear of shoulder, left, initial encounter (10/19/2015), Medial meniscus tear (05/17/2016), Migraines, Other abnormal glucose (ulcer), Patellar instability of right knee (07/29/2016), Peptic ulceration (2005), Shoulder impingement (11/19/2014), Shoulder pain, left (09/16/2015), Shoulder tendonitis (09/24/2014), and Stomach disorder.     Surgical History: has a past surgical history that includes Colonoscopy (2012); Hysterectomy; Knee arthroscopy, medial patello femoral ligament repair; Breast reconstruction (Bilateral); and Shoulder AC Joint Repair.     Family History: family history includes Breast cancer in her maternal grandmother; COPD in her mother; Heart disease in her maternal grandmother; Stroke in her father, maternal grandfather, and maternal grandmother.     Social History: reports that she has never smoked. She has never used smokeless tobacco. She reports that she does not drink alcohol and does not use drugs.    Allergies: Hydrocodone, Cephalosporins, Erythromycin, Hydrocodone-acetaminophen, Semaglutide(0.25 or 0.5mg-dos), and Sulfa antibiotics          Current Outpatient Medications:   •  Bacillus Coagulans-Inulin (Probiotic) 1-250 BILLION-MG capsule, Probiotic 100  billion cell oral capsule take 1 capsule by oral route daily for 30 days   Suspended, Disp: , Rfl:   •  busPIRone (BUSPAR) 30 MG tablet, TAKE 1 TABLET BY MOUTH TWICE A DAY AS DIRECTED, Disp: , Rfl:   •  butalbital-acetaminophen-caffeine (FIORICET, ESGIC) -40 MG per tablet, Take 1 tablet by mouth Every 6 (Six) Hours As Needed for Headache., Disp: 120 tablet, Rfl: 0  •  ciprofloxacin (Ciloxan) 0.3 % ophthalmic solution, Apply 1 drop to eye(s) as directed by provider 6 (Six) Times a Day., Disp: 10 mL, Rfl: 0  •  cyclobenzaprine (FLEXERIL) 10 MG tablet, Take 1 tablet by mouth 3 (Three) Times a Day As Needed for Muscle Spasms., Disp: 60 tablet, Rfl: 1  •  dicyclomine (BENTYL) 20 MG tablet, Take  by mouth., Disp: , Rfl:   •  eszopiclone (LUNESTA) 3 MG tablet, Take 3 mg by mouth every night at bedtime., Disp: , Rfl:   •  hydroCHLOROthiazide (HYDRODIURIL) 12.5 MG tablet, Take 1 tablet by mouth Daily., Disp: 30 tablet, Rfl: 1  •  hydrOXYzine (ATARAX) 50 MG tablet, TAKE ONE TABLET BY MOUTH THREE TIMES PER DAY AS NEEDED FOR ANXIETY, Disp: , Rfl:   •  linaclotide (LINZESS) 290 MCG capsule capsule, Linzess 290 mcg oral capsule take 1 capsule (290 mcg) by oral route once daily on an empty stomach at least 30 minutes before 1st meal of the day for 30 days   Suspended, Disp: , Rfl:   •  LORazepam (ATIVAN) 0.5 MG tablet, TAKE ONE TABLET BY MOUTH TWO TIMES A DAY AS NEEDED FOR SEVERE ANXIETY, Disp: , Rfl:   •  lubiprostone (AMITIZA) 24 MCG capsule, Amitiza 24 mcg oral capsule take 1 capsule (24 mcg) by oral route 2 times per day with food and water for 30 days   Active, Disp: , Rfl:   •  meloxicam (MOBIC) 15 MG tablet, Take 15 mg by mouth Daily., Disp: , Rfl:   •  Meth-Hyo-M Bl-Na Phos-Ph Sal (Uro-MP) 118 MG capsule, TAKE ONE CAPSULE BY MOUTH FOUR TIMES A DAY AS NEEDED FOR 3 DAYS, Disp: , Rfl:   •  Mirabegron ER (MYRBETRIQ) 50 MG tablet sustained-release 24 hour 24 hr tablet, Take 50 mg by mouth Daily., Disp: 90 tablet, Rfl:  1  •  nexIUM 40 MG capsule, TAKE ONE CAPSULE BY MOUTH ONCE DAILY, Disp: 30 capsule, Rfl: 6  •  Nurtec 75 MG dispersible tablet, PLACE 1 TABLET ON TONGUE, ALLOW TO DISSOLVE THEN SWALLOW ONCE AS NEEDED FOR MIGRAINE; MAX 1 DOSE/24 HRS, Disp: 8 tablet, Rfl: 5  •  ondansetron ODT (ZOFRAN-ODT) 4 MG disintegrating tablet, TAKE 1 TABLET BY MOUTH EVERY 8 HOURS IF NEEDED FOR NAUSEA OR VOMITING FOR UP TO 7 DAYS., Disp: , Rfl:   •  PARoxetine (PAXIL) 30 MG tablet, TAKE TWO TABLETS EVERY NIGHT, Disp: , Rfl:   •  phenazopyridine (Pyridium) 100 MG tablet, Take 1 tablet by mouth 3 (Three) Times a Day As Needed for Bladder Spasms., Disp: 10 tablet, Rfl: 0  •  phentermine 37.5 MG capsule, Take 1 capsule by mouth Every Morning., Disp: 30 capsule, Rfl: 0  •  propranolol (INDERAL) 10 MG tablet, Take 10 mg by mouth 3 (Three) Times a Day As Needed. for anxiety, Disp: , Rfl:   •  riFAXIMin (XIFAXAN) 550 MG tablet, Take 550 mg by mouth 3 (Three) Times a Day., Disp: , Rfl:   •  topiramate (TOPAMAX) 50 MG tablet, TAKE 1 TABLET BY MOUTH TWICE A DAY, Disp: 60 tablet, Rfl: 6  •  traMADol (ULTRAM) 50 MG tablet, Take 1 tablet by mouth Every 6 (Six) Hours As Needed for Moderate Pain., Disp: 30 tablet, Rfl: 0    Medications Discontinued During This Encounter   Medication Reason   • nitrofurantoin, macrocrystal-monohydrate, (Macrobid) 100 MG capsule *Therapy completed   • butalbital-acetaminophen-caffeine (FIORICET, ESGIC) -40 MG per tablet Reorder   • phentermine 37.5 MG capsule Reorder         Review of Systems   Constitutional: Negative for fever.   Respiratory: Negative for cough and shortness of breath.    Cardiovascular: Negative for chest pain, palpitations and leg swelling.   Neurological: Negative for dizziness, weakness, numbness and headache.        Objective         Vitals:    01/13/23 0908   BP: 102/76   BP Location: Right arm   Patient Position: Sitting   Cuff Size: Adult   Pulse: 62   Resp: 16   Temp: 96 °F (35.6 °C)   TempSrc:  "Temporal   SpO2: 100%   Weight: 78.4 kg (172 lb 12.8 oz)   Height: 165.1 cm (65\")     Body mass index is 28.76 kg/m².         Physical Exam  Vitals reviewed.   Constitutional:       Appearance: Normal appearance. She is well-developed.   HENT:      Head: Normocephalic and atraumatic.      Mouth/Throat:      Pharynx: No oropharyngeal exudate.   Eyes:      Conjunctiva/sclera: Conjunctivae normal.      Pupils: Pupils are equal, round, and reactive to light.   Cardiovascular:      Rate and Rhythm: Normal rate and regular rhythm.      Heart sounds: Normal heart sounds. No murmur heard.    No friction rub. No gallop.   Pulmonary:      Effort: Pulmonary effort is normal.      Breath sounds: Normal breath sounds. No wheezing or rhonchi.   Skin:     General: Skin is warm and dry.   Neurological:      Mental Status: She is alert and oriented to person, place, and time.   Psychiatric:         Mood and Affect: Mood and affect normal.         Behavior: Behavior normal.         Thought Content: Thought content normal.         Judgment: Judgment normal.             Result Review :               Assessment and Plan     Diagnoses and all orders for this visit:    1. Acute low back pain without sciatica, unspecified back pain laterality (Primary)  -     traMADol (ULTRAM) 50 MG tablet; Take 1 tablet by mouth Every 6 (Six) Hours As Needed for Moderate Pain.  Dispense: 30 tablet; Refill: 0    2. Migraine without aura and without status migrainosus, not intractable  Comments:  continue nurtec and will appeal to get for patient if able.  Orders:  -     butalbital-acetaminophen-caffeine (FIORICET, ESGIC) -40 MG per tablet; Take 1 tablet by mouth Every 6 (Six) Hours As Needed for Headache.  Dispense: 120 tablet; Refill: 0    3. Overweight with body mass index (BMI) of 28 to 28.9 in adult  -     phentermine 37.5 MG capsule; Take 1 capsule by mouth Every Morning.  Dispense: 30 capsule; Refill: 0              Follow Up     Return in " about 1 month (around 2/13/2023).    Patient was given instructions and counseling regarding her condition or for health maintenance advice. Please see specific information pulled into the AVS if appropriate.

## 2023-02-17 ENCOUNTER — OFFICE VISIT (OUTPATIENT)
Dept: FAMILY MEDICINE CLINIC | Facility: CLINIC | Age: 48
End: 2023-02-17
Payer: COMMERCIAL

## 2023-02-17 VITALS
BODY MASS INDEX: 27.96 KG/M2 | WEIGHT: 167.8 LBS | HEART RATE: 77 BPM | RESPIRATION RATE: 16 BRPM | SYSTOLIC BLOOD PRESSURE: 106 MMHG | HEIGHT: 65 IN | TEMPERATURE: 97.5 F | OXYGEN SATURATION: 99 % | DIASTOLIC BLOOD PRESSURE: 68 MMHG

## 2023-02-17 DIAGNOSIS — E66.3 OVERWEIGHT WITH BODY MASS INDEX (BMI) OF 27 TO 27.9 IN ADULT: ICD-10-CM

## 2023-02-17 PROCEDURE — 99213 OFFICE O/P EST LOW 20 MIN: CPT | Performed by: NURSE PRACTITIONER

## 2023-02-17 RX ORDER — PHENTERMINE HYDROCHLORIDE 37.5 MG/1
37.5 CAPSULE ORAL EVERY MORNING
Qty: 30 CAPSULE | Refills: 0 | Status: SHIPPED | OUTPATIENT
Start: 2023-02-17 | End: 2023-03-10 | Stop reason: SDUPTHER

## 2023-02-17 RX ORDER — RIFAXIMIN 550 MG/1
TABLET ORAL
COMMUNITY
Start: 2023-01-25

## 2023-02-17 RX ORDER — PHENTERMINE HYDROCHLORIDE 37.5 MG/1
37.5 CAPSULE ORAL EVERY MORNING
Qty: 30 CAPSULE | Refills: 0 | Status: SHIPPED | OUTPATIENT
Start: 2023-02-17 | End: 2023-02-17

## 2023-02-17 RX ORDER — HYOSCYAMINE SULFATE 0.12 MG/1
125 TABLET SUBLINGUAL
COMMUNITY
Start: 2023-01-25

## 2023-02-17 NOTE — PROGRESS NOTES
Chief Complaint  Obesity    Subjective        Angelika Shukla presents to [facility]  History of Present Illness  Has lost 5 pounds.  Denies chest pain or shortness of breath.  Obesity  Pertinent negatives include no chest pain, coughing, fever, numbness or weakness.       The following portions of the patient's history were personally reviewed and updated as appropriate: allergies, current medications, past medical history, past surgical history, past family history, and past social history.     Body mass index is 27.92 kg/m².    BMI is >= 25 and <30. (Overweight) The following options were offered after discussion;: weight loss educational material (shared in after visit summary)      Past History:    Medical History: has a past medical history of Allergies, Anxiety (2014), Asthma, Bowel disease, Broken bones, Crohn disease (HCC), Diverticulitis, Diverticulosis (2010), Inflammatory bowel disease (2010), Irritable bowel syndrome (2010), Knee pain, Labral tear of shoulder, left, initial encounter (10/19/2015), Medial meniscus tear (05/17/2016), Migraines, Other abnormal glucose (ulcer), Patellar instability of right knee (07/29/2016), Peptic ulceration (2005), Shoulder impingement (11/19/2014), Shoulder pain, left (09/16/2015), Shoulder tendonitis (09/24/2014), and Stomach disorder.     Surgical History: has a past surgical history that includes Colonoscopy (2012); Hysterectomy; Knee arthroscopy, medial patello femoral ligament repair; Breast reconstruction (Bilateral); and Shoulder AC Joint Repair.     Family History: family history includes Breast cancer in her maternal grandmother; COPD in her mother; Heart disease in her maternal grandmother; Stroke in her father, maternal grandfather, and maternal grandmother.     Social History: reports that she has never smoked. She has never used smokeless tobacco. She reports that she does not drink alcohol and does not use  drugs.    Allergies: Hydrocodone, Cephalosporins, Erythromycin, Hydrocodone-acetaminophen, Semaglutide(0.25 or 0.5mg-dos), and Sulfa antibiotics          Current Outpatient Medications:   •  Bacillus Coagulans-Inulin (Probiotic) 1-250 BILLION-MG capsule, Probiotic 100 billion cell oral capsule take 1 capsule by oral route daily for 30 days   Suspended, Disp: , Rfl:   •  busPIRone (BUSPAR) 30 MG tablet, TAKE 1 TABLET BY MOUTH TWICE A DAY AS DIRECTED, Disp: , Rfl:   •  butalbital-acetaminophen-caffeine (FIORICET, ESGIC) -40 MG per tablet, Take 1 tablet by mouth Every 6 (Six) Hours As Needed for Headache., Disp: 120 tablet, Rfl: 0  •  ciprofloxacin (Ciloxan) 0.3 % ophthalmic solution, Apply 1 drop to eye(s) as directed by provider 6 (Six) Times a Day., Disp: 10 mL, Rfl: 0  •  cyclobenzaprine (FLEXERIL) 10 MG tablet, Take 1 tablet by mouth 3 (Three) Times a Day As Needed for Muscle Spasms., Disp: 60 tablet, Rfl: 1  •  dicyclomine (BENTYL) 20 MG tablet, Take  by mouth., Disp: , Rfl:   •  eszopiclone (LUNESTA) 3 MG tablet, Take 3 mg by mouth every night at bedtime., Disp: , Rfl:   •  hydroCHLOROthiazide (HYDRODIURIL) 12.5 MG tablet, Take 1 tablet by mouth Daily., Disp: 30 tablet, Rfl: 1  •  hydrOXYzine (ATARAX) 50 MG tablet, TAKE ONE TABLET BY MOUTH THREE TIMES PER DAY AS NEEDED FOR ANXIETY, Disp: , Rfl:   •  Hyoscyamine Sulfate SL 0.125 MG sublingual tablet, Place 125 mcg under the tongue., Disp: , Rfl:   •  linaclotide (LINZESS) 290 MCG capsule capsule, Linzess 290 mcg oral capsule take 1 capsule (290 mcg) by oral route once daily on an empty stomach at least 30 minutes before 1st meal of the day for 30 days   Suspended, Disp: , Rfl:   •  LORazepam (ATIVAN) 0.5 MG tablet, TAKE ONE TABLET BY MOUTH TWO TIMES A DAY AS NEEDED FOR SEVERE ANXIETY, Disp: , Rfl:   •  lubiprostone (AMITIZA) 24 MCG capsule, Amitiza 24 mcg oral capsule take 1 capsule (24 mcg) by oral route 2 times per day with food and water for 30 days    Active, Disp: , Rfl:   •  meloxicam (MOBIC) 15 MG tablet, Take 15 mg by mouth Daily., Disp: , Rfl:   •  Meth-Hyo-M Bl-Na Phos-Ph Sal (Uro-MP) 118 MG capsule, TAKE ONE CAPSULE BY MOUTH FOUR TIMES A DAY AS NEEDED FOR 3 DAYS, Disp: , Rfl:   •  Mirabegron ER (MYRBETRIQ) 50 MG tablet sustained-release 24 hour 24 hr tablet, Take 50 mg by mouth Daily., Disp: 90 tablet, Rfl: 1  •  nexIUM 40 MG capsule, TAKE ONE CAPSULE BY MOUTH ONCE DAILY, Disp: 90 capsule, Rfl: 1  •  Nurtec 75 MG dispersible tablet, PLACE 1 TABLET ON TONGUE, ALLOW TO DISSOLVE THEN SWALLOW ONCE AS NEEDED FOR MIGRAINE; MAX 1 DOSE/24 HRS, Disp: 8 tablet, Rfl: 5  •  ondansetron ODT (ZOFRAN-ODT) 4 MG disintegrating tablet, TAKE 1 TABLET BY MOUTH EVERY 8 HOURS IF NEEDED FOR NAUSEA OR VOMITING FOR UP TO 7 DAYS., Disp: , Rfl:   •  PARoxetine (PAXIL) 30 MG tablet, TAKE TWO TABLETS EVERY NIGHT, Disp: , Rfl:   •  phentermine 37.5 MG capsule, Take 1 capsule by mouth Every Morning., Disp: 30 capsule, Rfl: 0  •  propranolol (INDERAL) 10 MG tablet, Take 10 mg by mouth 3 (Three) Times a Day As Needed. for anxiety, Disp: , Rfl:   •  topiramate (TOPAMAX) 50 MG tablet, TAKE 1 TABLET BY MOUTH TWICE A DAY, Disp: 60 tablet, Rfl: 6  •  traMADol (ULTRAM) 50 MG tablet, Take 1 tablet by mouth Every 6 (Six) Hours As Needed for Moderate Pain., Disp: 30 tablet, Rfl: 0  •  Xifaxan 550 MG tablet, TAKE ONE TABLET BY MOUTH THREE TIMES PER DAY FOR 14 DAYS, Disp: , Rfl:   •  phenazopyridine (Pyridium) 100 MG tablet, Take 1 tablet by mouth 3 (Three) Times a Day As Needed for Bladder Spasms., Disp: 10 tablet, Rfl: 0    There are no discontinued medications.      Review of Systems   Constitutional: Negative for fever.   Respiratory: Negative for cough and shortness of breath.    Cardiovascular: Negative for chest pain, palpitations and leg swelling.   Neurological: Negative for dizziness, weakness, numbness and headache.        Objective         Vitals:    02/17/23 0717   BP: 106/68   BP  "Location: Right arm   Patient Position: Sitting   Cuff Size: Adult   Pulse: 77   Resp: 16   Temp: 97.5 °F (36.4 °C)   TempSrc: Temporal   SpO2: 99%   Weight: 76.1 kg (167 lb 12.8 oz)   Height: 165.1 cm (65\")     Body mass index is 27.92 kg/m².         Physical Exam  Vitals reviewed.   Constitutional:       Appearance: Normal appearance. She is well-developed.   HENT:      Head: Normocephalic and atraumatic.      Mouth/Throat:      Pharynx: No oropharyngeal exudate.   Eyes:      Conjunctiva/sclera: Conjunctivae normal.      Pupils: Pupils are equal, round, and reactive to light.   Cardiovascular:      Rate and Rhythm: Normal rate and regular rhythm.      Heart sounds: Normal heart sounds. No murmur heard.    No friction rub. No gallop.   Pulmonary:      Effort: Pulmonary effort is normal.      Breath sounds: Normal breath sounds. No wheezing or rhonchi.   Skin:     General: Skin is warm and dry.   Neurological:      Mental Status: She is alert and oriented to person, place, and time.   Psychiatric:         Mood and Affect: Mood and affect normal.         Behavior: Behavior normal.         Thought Content: Thought content normal.         Judgment: Judgment normal.             Result Review :               Assessment and Plan     Diagnoses and all orders for this visit:    1. Overweight with body mass index (BMI) of 27 to 27.9 in adult              Follow Up     Return in about 1 month (around 3/17/2023).    Patient was given instructions and counseling regarding her condition or for health maintenance advice. Please see specific information pulled into the AVS if appropriate.         "

## 2023-03-10 ENCOUNTER — OFFICE VISIT (OUTPATIENT)
Dept: FAMILY MEDICINE CLINIC | Facility: CLINIC | Age: 48
End: 2023-03-10
Payer: COMMERCIAL

## 2023-03-10 VITALS
HEIGHT: 65 IN | HEART RATE: 86 BPM | TEMPERATURE: 96.7 F | BODY MASS INDEX: 27.99 KG/M2 | SYSTOLIC BLOOD PRESSURE: 110 MMHG | OXYGEN SATURATION: 99 % | WEIGHT: 168 LBS | RESPIRATION RATE: 16 BRPM | DIASTOLIC BLOOD PRESSURE: 76 MMHG

## 2023-03-10 DIAGNOSIS — E66.3 OVERWEIGHT WITH BODY MASS INDEX (BMI) OF 27 TO 27.9 IN ADULT: ICD-10-CM

## 2023-03-10 PROCEDURE — 99213 OFFICE O/P EST LOW 20 MIN: CPT | Performed by: NURSE PRACTITIONER

## 2023-03-10 RX ORDER — PRAZOSIN HYDROCHLORIDE 2 MG/1
CAPSULE ORAL
COMMUNITY
Start: 2023-02-24

## 2023-03-10 RX ORDER — PHENTERMINE HYDROCHLORIDE 37.5 MG/1
37.5 CAPSULE ORAL EVERY MORNING
Qty: 30 CAPSULE | Refills: 0 | Status: SHIPPED | OUTPATIENT
Start: 2023-03-10

## 2023-03-10 NOTE — PROGRESS NOTES
Chief Complaint  Obesity    Subjective        Angelika Shukla presents to [facility]  History of Present Illness  Hasn't lost weight but states has been constipated for the last 2 days.   Would like to try one more month and not having shortness of breath or chest pain.  Obesity  Pertinent negatives include no chest pain, coughing, fever, numbness or weakness.       The following portions of the patient's history were personally reviewed and updated as appropriate: allergies, current medications, past medical history, past surgical history, past family history, and past social history.     Body mass index is 27.96 kg/m².    BMI is >= 25 and <30. (Overweight) The following options were offered after discussion;: weight loss educational material (shared in after visit summary)      Past History:    Medical History: has a past medical history of Allergies, Anxiety (2014), Asthma, Bowel disease, Broken bones, Crohn disease (HCC), Diverticulitis, Diverticulosis (2010), Inflammatory bowel disease (2010), Irritable bowel syndrome (2010), Knee pain, Labral tear of shoulder, left, initial encounter (10/19/2015), Medial meniscus tear (05/17/2016), Migraines, Other abnormal glucose (ulcer), Patellar instability of right knee (07/29/2016), Peptic ulceration (2005), Shoulder impingement (11/19/2014), Shoulder pain, left (09/16/2015), Shoulder tendonitis (09/24/2014), and Stomach disorder.     Surgical History: has a past surgical history that includes Colonoscopy (2012); Hysterectomy; Knee arthroscopy, medial patello femoral ligament repair; Breast reconstruction (Bilateral); and Shoulder AC Joint Repair.     Family History: family history includes Breast cancer in her maternal grandmother; COPD in her mother; Heart disease in her maternal grandmother; Stroke in her father, maternal grandfather, and maternal grandmother.     Social History: reports that she has never smoked. She has never used  smokeless tobacco. She reports that she does not drink alcohol and does not use drugs.    Allergies: Hydrocodone, Cephalosporins, Erythromycin, Hydrocodone-acetaminophen, Semaglutide(0.25 or 0.5mg-dos), and Sulfa antibiotics          Current Outpatient Medications:   •  Bacillus Coagulans-Inulin (Probiotic) 1-250 BILLION-MG capsule, Probiotic 100 billion cell oral capsule take 1 capsule by oral route daily for 30 days   Suspended, Disp: , Rfl:   •  busPIRone (BUSPAR) 30 MG tablet, TAKE 1 TABLET BY MOUTH TWICE A DAY AS DIRECTED, Disp: , Rfl:   •  butalbital-acetaminophen-caffeine (FIORICET, ESGIC) -40 MG per tablet, Take 1 tablet by mouth Every 6 (Six) Hours As Needed for Headache., Disp: 120 tablet, Rfl: 0  •  cyclobenzaprine (FLEXERIL) 10 MG tablet, Take 1 tablet by mouth 3 (Three) Times a Day As Needed for Muscle Spasms., Disp: 60 tablet, Rfl: 1  •  dicyclomine (BENTYL) 20 MG tablet, Take  by mouth., Disp: , Rfl:   •  eszopiclone (LUNESTA) 3 MG tablet, Take 1 tablet by mouth every night at bedtime., Disp: , Rfl:   •  hydroCHLOROthiazide (HYDRODIURIL) 12.5 MG tablet, Take 1 tablet by mouth Daily., Disp: 30 tablet, Rfl: 1  •  hydrOXYzine (ATARAX) 50 MG tablet, TAKE ONE TABLET BY MOUTH THREE TIMES PER DAY AS NEEDED FOR ANXIETY, Disp: , Rfl:   •  Hyoscyamine Sulfate SL 0.125 MG sublingual tablet, Place 125 mcg under the tongue., Disp: , Rfl:   •  linaclotide (LINZESS) 290 MCG capsule capsule, Linzess 290 mcg oral capsule take 1 capsule (290 mcg) by oral route once daily on an empty stomach at least 30 minutes before 1st meal of the day for 30 days   Suspended, Disp: , Rfl:   •  LORazepam (ATIVAN) 0.5 MG tablet, TAKE ONE TABLET BY MOUTH TWO TIMES A DAY AS NEEDED FOR SEVERE ANXIETY, Disp: , Rfl:   •  lubiprostone (AMITIZA) 24 MCG capsule, Amitiza 24 mcg oral capsule take 1 capsule (24 mcg) by oral route 2 times per day with food and water for 30 days   Active, Disp: , Rfl:   •  meloxicam (MOBIC) 15 MG tablet, Take  1 tablet by mouth Daily., Disp: , Rfl:   •  Meth-Hyo-M Bl-Na Phos-Ph Sal (Uro-MP) 118 MG capsule, TAKE ONE CAPSULE BY MOUTH FOUR TIMES A DAY AS NEEDED FOR 3 DAYS, Disp: , Rfl:   •  Mirabegron ER (MYRBETRIQ) 50 MG tablet sustained-release 24 hour 24 hr tablet, Take 50 mg by mouth Daily., Disp: 90 tablet, Rfl: 1  •  nexIUM 40 MG capsule, TAKE ONE CAPSULE BY MOUTH ONCE DAILY, Disp: 90 capsule, Rfl: 1  •  Nurtec 75 MG dispersible tablet, PLACE 1 TABLET ON TONGUE, ALLOW TO DISSOLVE THEN SWALLOW ONCE AS NEEDED FOR MIGRAINE; MAX 1 DOSE/24 HRS, Disp: 8 tablet, Rfl: 5  •  ondansetron ODT (ZOFRAN-ODT) 4 MG disintegrating tablet, TAKE 1 TABLET BY MOUTH EVERY 8 HOURS IF NEEDED FOR NAUSEA OR VOMITING FOR UP TO 7 DAYS., Disp: , Rfl:   •  PARoxetine (PAXIL) 30 MG tablet, TAKE TWO TABLETS EVERY NIGHT, Disp: , Rfl:   •  phentermine 37.5 MG capsule, Take 1 capsule by mouth Every Morning., Disp: 30 capsule, Rfl: 0  •  prazosin (MINIPRESS) 2 MG capsule, TAKE ONE CAPSULE BY MOUTH AT BEDTIME FOR NIGHTMARES, Disp: , Rfl:   •  propranolol (INDERAL) 10 MG tablet, Take 1 tablet by mouth 3 (Three) Times a Day As Needed. for anxiety, Disp: , Rfl:   •  topiramate (TOPAMAX) 50 MG tablet, TAKE 1 TABLET BY MOUTH TWICE A DAY, Disp: 60 tablet, Rfl: 6  •  traMADol (ULTRAM) 50 MG tablet, Take 1 tablet by mouth Every 6 (Six) Hours As Needed for Moderate Pain., Disp: 30 tablet, Rfl: 0  •  Xifaxan 550 MG tablet, TAKE ONE TABLET BY MOUTH THREE TIMES PER DAY FOR 14 DAYS, Disp: , Rfl:   •  ciprofloxacin (Ciloxan) 0.3 % ophthalmic solution, Apply 1 drop to eye(s) as directed by provider 6 (Six) Times a Day., Disp: 10 mL, Rfl: 0  •  phenazopyridine (Pyridium) 100 MG tablet, Take 1 tablet by mouth 3 (Three) Times a Day As Needed for Bladder Spasms., Disp: 10 tablet, Rfl: 0    Medications Discontinued During This Encounter   Medication Reason   • phentermine 37.5 MG capsule Reorder         Review of Systems   Constitutional: Negative for fever.   Respiratory:  "Negative for cough and shortness of breath.    Cardiovascular: Negative for chest pain, palpitations and leg swelling.   Neurological: Negative for dizziness, weakness, numbness and headache.        Objective         Vitals:    03/10/23 1012   BP: 110/76   BP Location: Right arm   Patient Position: Sitting   Cuff Size: Adult   Pulse: 86   Resp: 16   Temp: 96.7 °F (35.9 °C)   TempSrc: Temporal   SpO2: 99%   Weight: 76.2 kg (168 lb)   Height: 165.1 cm (65\")     Body mass index is 27.96 kg/m².         Physical Exam  Vitals reviewed.   Constitutional:       Appearance: Normal appearance. She is well-developed.   HENT:      Head: Normocephalic and atraumatic.      Mouth/Throat:      Pharynx: No oropharyngeal exudate.   Eyes:      Conjunctiva/sclera: Conjunctivae normal.      Pupils: Pupils are equal, round, and reactive to light.   Cardiovascular:      Rate and Rhythm: Normal rate and regular rhythm.      Heart sounds: Normal heart sounds. No murmur heard.    No friction rub. No gallop.   Pulmonary:      Effort: Pulmonary effort is normal.      Breath sounds: Normal breath sounds. No wheezing or rhonchi.   Skin:     General: Skin is warm and dry.   Neurological:      Mental Status: She is alert and oriented to person, place, and time.   Psychiatric:         Mood and Affect: Mood and affect normal.         Behavior: Behavior normal.         Thought Content: Thought content normal.         Judgment: Judgment normal.             Result Review :               Assessment and Plan     Diagnoses and all orders for this visit:    1. Overweight with body mass index (BMI) of 27 to 27.9 in adult  -     phentermine 37.5 MG capsule; Take 1 capsule by mouth Every Morning.  Dispense: 30 capsule; Refill: 0              Follow Up     Return in about 1 month (around 4/10/2023).    Patient was given instructions and counseling regarding her condition or for health maintenance advice. Please see specific information pulled into the AVS if " appropriate.

## 2023-04-28 ENCOUNTER — OFFICE VISIT (OUTPATIENT)
Dept: FAMILY MEDICINE CLINIC | Facility: CLINIC | Age: 48
End: 2023-04-28
Payer: COMMERCIAL

## 2023-04-28 VITALS
OXYGEN SATURATION: 99 % | HEART RATE: 95 BPM | WEIGHT: 177.4 LBS | DIASTOLIC BLOOD PRESSURE: 72 MMHG | SYSTOLIC BLOOD PRESSURE: 124 MMHG | RESPIRATION RATE: 16 BRPM | HEIGHT: 65 IN | TEMPERATURE: 97 F | BODY MASS INDEX: 29.56 KG/M2

## 2023-04-28 DIAGNOSIS — J02.9 SORE THROAT: Primary | ICD-10-CM

## 2023-04-28 DIAGNOSIS — J06.9 ACUTE URI: ICD-10-CM

## 2023-04-28 LAB
EXPIRATION DATE: NORMAL
EXPIRATION DATE: NORMAL
FLUAV AG UPPER RESP QL IA.RAPID: NOT DETECTED
FLUBV AG UPPER RESP QL IA.RAPID: NOT DETECTED
INTERNAL CONTROL: NORMAL
INTERNAL CONTROL: NORMAL
Lab: NORMAL
Lab: NORMAL
S PYO AG THROAT QL: NEGATIVE
SARS-COV-2 AG UPPER RESP QL IA.RAPID: NOT DETECTED

## 2023-04-28 PROCEDURE — 87428 SARSCOV & INF VIR A&B AG IA: CPT | Performed by: NURSE PRACTITIONER

## 2023-04-28 PROCEDURE — 99213 OFFICE O/P EST LOW 20 MIN: CPT | Performed by: NURSE PRACTITIONER

## 2023-04-28 PROCEDURE — 87880 STREP A ASSAY W/OPTIC: CPT | Performed by: NURSE PRACTITIONER

## 2023-04-28 RX ORDER — DEXTROMETHORPHAN HYDROBROMIDE AND PROMETHAZINE HYDROCHLORIDE 15; 6.25 MG/5ML; MG/5ML
5 SYRUP ORAL 4 TIMES DAILY PRN
Qty: 180 ML | Refills: 0 | Status: SHIPPED | OUTPATIENT
Start: 2023-04-28 | End: 2023-04-28

## 2023-04-28 RX ORDER — DOXYCYCLINE HYCLATE 100 MG/1
100 CAPSULE ORAL 2 TIMES DAILY
Qty: 20 CAPSULE | Refills: 0 | Status: SHIPPED | OUTPATIENT
Start: 2023-04-28 | End: 2023-04-28

## 2023-04-28 RX ORDER — DEXTROMETHORPHAN HYDROBROMIDE AND PROMETHAZINE HYDROCHLORIDE 15; 6.25 MG/5ML; MG/5ML
5 SYRUP ORAL 4 TIMES DAILY PRN
Qty: 180 ML | Refills: 0 | Status: SHIPPED | OUTPATIENT
Start: 2023-04-28

## 2023-04-28 RX ORDER — DOXYCYCLINE HYCLATE 100 MG/1
100 CAPSULE ORAL 2 TIMES DAILY
Qty: 20 CAPSULE | Refills: 0 | Status: SHIPPED | OUTPATIENT
Start: 2023-04-28

## 2023-04-28 NOTE — PROGRESS NOTES
Chief Complaint  Sore Throat, Sinusitis, and Cough    Subjective        Angelika Shukla presents to [facility]  History of Present Illness  Congestion that started after taking care of grandchild who had some symptoms.  Also notes that  who smokes cigars and although she doesn't have issues with cigarettes she will get immediate headache and sore throat when inhales smokes.  Sore Throat   Associated symptoms include coughing.   Sinusitis  Associated symptoms include coughing and a sore throat.   Cough  Associated symptoms include a sore throat.       The following portions of the patient's history were personally reviewed and updated as appropriate: allergies, current medications, past medical history, past surgical history, past family history, and past social history.     Body mass index is 29.52 kg/m².    BMI is >= 25 and <30. (Overweight) The following options were offered after discussion;: weight loss educational material (shared in after visit summary)      Past History:    Medical History: has a past medical history of Allergies, Anxiety (2014), Asthma, Bowel disease, Broken bones, Crohn disease, Diverticulitis, Diverticulosis (2010), Inflammatory bowel disease (2010), Irritable bowel syndrome (2010), Knee pain, Labral tear of shoulder, left, initial encounter (10/19/2015), Medial meniscus tear (05/17/2016), Migraines, Other abnormal glucose (ulcer), Patellar instability of right knee (07/29/2016), Peptic ulceration (2005), Shoulder impingement (11/19/2014), Shoulder pain, left (09/16/2015), Shoulder tendonitis (09/24/2014), and Stomach disorder.     Surgical History: has a past surgical history that includes Colonoscopy (2012); Hysterectomy; Knee arthroscopy, medial patello femoral ligament repair; Breast reconstruction (Bilateral); and Shoulder AC Joint Repair.     Family History: family history includes Breast cancer in her maternal grandmother; COPD in her  mother; Heart disease in her maternal grandmother; Stroke in her father, maternal grandfather, and maternal grandmother.     Social History: reports that she has never smoked. She has never used smokeless tobacco. She reports that she does not drink alcohol and does not use drugs.    Allergies: Hydrocodone, Cephalosporins, Erythromycin, Hydrocodone-acetaminophen, Semaglutide(0.25 or 0.5mg-dos), and Sulfa antibiotics          Current Outpatient Medications:   •  Bacillus Coagulans-Inulin (Probiotic) 1-250 BILLION-MG capsule, Probiotic 100 billion cell oral capsule take 1 capsule by oral route daily for 30 days   Suspended, Disp: , Rfl:   •  busPIRone (BUSPAR) 30 MG tablet, TAKE 1 TABLET BY MOUTH TWICE A DAY AS DIRECTED, Disp: , Rfl:   •  butalbital-acetaminophen-caffeine (FIORICET, ESGIC) -40 MG per tablet, Take 1 tablet by mouth Every 6 (Six) Hours As Needed for Headache., Disp: 120 tablet, Rfl: 0  •  cyclobenzaprine (FLEXERIL) 10 MG tablet, Take 1 tablet by mouth 3 (Three) Times a Day As Needed for Muscle Spasms., Disp: 60 tablet, Rfl: 1  •  dicyclomine (BENTYL) 20 MG tablet, Take  by mouth., Disp: , Rfl:   •  eszopiclone (LUNESTA) 3 MG tablet, Take 1 tablet by mouth every night at bedtime., Disp: , Rfl:   •  hydroCHLOROthiazide (HYDRODIURIL) 12.5 MG tablet, Take 1 tablet by mouth Daily., Disp: 30 tablet, Rfl: 1  •  hydrOXYzine (ATARAX) 50 MG tablet, TAKE ONE TABLET BY MOUTH THREE TIMES PER DAY AS NEEDED FOR ANXIETY, Disp: , Rfl:   •  Hyoscyamine Sulfate SL 0.125 MG sublingual tablet, Place 125 mcg under the tongue., Disp: , Rfl:   •  linaclotide (LINZESS) 290 MCG capsule capsule, Linzess 290 mcg oral capsule take 1 capsule (290 mcg) by oral route once daily on an empty stomach at least 30 minutes before 1st meal of the day for 30 days   Suspended, Disp: , Rfl:   •  LORazepam (ATIVAN) 0.5 MG tablet, TAKE ONE TABLET BY MOUTH TWO TIMES A DAY AS NEEDED FOR SEVERE ANXIETY, Disp: , Rfl:   •  lubiprostone (AMITIZA)  24 MCG capsule, Amitiza 24 mcg oral capsule take 1 capsule (24 mcg) by oral route 2 times per day with food and water for 30 days   Active, Disp: , Rfl:   •  meloxicam (MOBIC) 15 MG tablet, Take 1 tablet by mouth Daily., Disp: , Rfl:   •  Meth-Hyo-M Bl-Na Phos-Ph Sal (Uro-MP) 118 MG capsule, TAKE ONE CAPSULE BY MOUTH FOUR TIMES A DAY AS NEEDED FOR 3 DAYS, Disp: , Rfl:   •  Mirabegron ER (MYRBETRIQ) 50 MG tablet sustained-release 24 hour 24 hr tablet, Take 50 mg by mouth Daily., Disp: 90 tablet, Rfl: 1  •  nexIUM 40 MG capsule, TAKE ONE CAPSULE BY MOUTH ONCE DAILY, Disp: 90 capsule, Rfl: 1  •  Nurtec 75 MG dispersible tablet, PLACE 1 TABLET ON TONGUE, ALLOW TO DISSOLVE THEN SWALLOW ONCE AS NEEDED FOR MIGRAINE; MAX 1 DOSE/24 HRS, Disp: 8 tablet, Rfl: 5  •  ondansetron ODT (ZOFRAN-ODT) 4 MG disintegrating tablet, TAKE 1 TABLET BY MOUTH EVERY 8 HOURS IF NEEDED FOR NAUSEA OR VOMITING FOR UP TO 7 DAYS., Disp: , Rfl:   •  PARoxetine (PAXIL) 30 MG tablet, TAKE TWO TABLETS EVERY NIGHT, Disp: , Rfl:   •  phenazopyridine (Pyridium) 100 MG tablet, Take 1 tablet by mouth 3 (Three) Times a Day As Needed for Bladder Spasms., Disp: 10 tablet, Rfl: 0  •  phentermine 37.5 MG capsule, Take 1 capsule by mouth Every Morning., Disp: 30 capsule, Rfl: 0  •  prazosin (MINIPRESS) 2 MG capsule, TAKE ONE CAPSULE BY MOUTH AT BEDTIME FOR NIGHTMARES, Disp: , Rfl:   •  propranolol (INDERAL) 10 MG tablet, Take 1 tablet by mouth 3 (Three) Times a Day As Needed. for anxiety, Disp: , Rfl:   •  topiramate (TOPAMAX) 50 MG tablet, TAKE 1 TABLET BY MOUTH TWICE A DAY, Disp: 60 tablet, Rfl: 6  •  traMADol (ULTRAM) 50 MG tablet, Take 1 tablet by mouth Every 6 (Six) Hours As Needed for Moderate Pain., Disp: 30 tablet, Rfl: 0  •  Xifaxan 550 MG tablet, TAKE ONE TABLET BY MOUTH THREE TIMES PER DAY FOR 14 DAYS, Disp: , Rfl:   •  ciprofloxacin (Ciloxan) 0.3 % ophthalmic solution, Apply 1 drop to eye(s) as directed by provider 6 (Six) Times a Day. (Patient not  "taking: Reported on 4/28/2023), Disp: 10 mL, Rfl: 0  •  doxycycline (VIBRAMYCIN) 100 MG capsule, Take 1 capsule by mouth 2 (Two) Times a Day., Disp: 20 capsule, Rfl: 0    There are no discontinued medications.      Review of Systems   HENT: Positive for sore throat.    Respiratory: Positive for cough.         Objective         Vitals:    04/28/23 1334   BP: 124/72   BP Location: Right arm   Patient Position: Sitting   Cuff Size: Adult   Pulse: 95   Resp: 16   Temp: 97 °F (36.1 °C)   TempSrc: Temporal   SpO2: 99%   Weight: 80.5 kg (177 lb 6.4 oz)   Height: 165.1 cm (65\")     Body mass index is 29.52 kg/m².         Physical Exam        Result Review :               Assessment and Plan     Diagnoses and all orders for this visit:    1. Sore throat (Primary)  -     POC Rapid Strep A  -     doxycycline (VIBRAMYCIN) 100 MG capsule; Take 1 capsule by mouth 2 (Two) Times a Day.  Dispense: 20 capsule; Refill: 0    2. Acute URI  -     doxycycline (VIBRAMYCIN) 100 MG capsule; Take 1 capsule by mouth 2 (Two) Times a Day.  Dispense: 20 capsule; Refill: 0              Follow Up     No follow-ups on file.    Patient was given instructions and counseling regarding her condition or for health maintenance advice. Please see specific information pulled into the AVS if appropriate.         "

## 2023-06-16 ENCOUNTER — PATIENT MESSAGE (OUTPATIENT)
Dept: FAMILY MEDICINE CLINIC | Facility: CLINIC | Age: 48
End: 2023-06-16
Payer: COMMERCIAL

## 2023-06-16 DIAGNOSIS — K57.92 DIVERTICULITIS: Primary | ICD-10-CM

## 2023-06-16 RX ORDER — LUBIPROSTONE 24 UG/1
24 CAPSULE ORAL 2 TIMES DAILY WITH MEALS
Qty: 60 CAPSULE | Refills: 0 | Status: SHIPPED | OUTPATIENT
Start: 2023-06-16

## 2023-06-16 NOTE — TELEPHONE ENCOUNTER
From: Angelika Shukla  To: Jesse Mukherjee  Sent: 6/16/2023 11:48 AM EDT  Subject: Urgent     I've been out of my Amitiza medication. Dr Cottrell office has not sent it in bc there out. Can you refill it please so I don't have an ER visit.

## 2023-07-27 ENCOUNTER — PATIENT MESSAGE (OUTPATIENT)
Dept: FAMILY MEDICINE CLINIC | Facility: CLINIC | Age: 48
End: 2023-07-27
Payer: COMMERCIAL

## 2023-07-28 RX ORDER — NITROFURANTOIN 25; 75 MG/1; MG/1
100 CAPSULE ORAL 2 TIMES DAILY
Qty: 14 CAPSULE | Refills: 0 | Status: SHIPPED | OUTPATIENT
Start: 2023-07-28

## 2023-07-28 NOTE — TELEPHONE ENCOUNTER
From: Angelika Shukla  To: Jesse Mukherjee  Sent: 7/27/2023 7:34 PM EDT  Subject: Uti infection     Jesse  I have a terrible uti. It is burning to pee and I'm only peeing a few drops at a time. I have to go with Yessenia tomorrow to [facility]. Can you call me in something to relieve this hurt and schedule follow up Wednesday next week

## 2023-08-04 ENCOUNTER — OFFICE VISIT (OUTPATIENT)
Dept: FAMILY MEDICINE CLINIC | Facility: CLINIC | Age: 48
End: 2023-08-04
Payer: COMMERCIAL

## 2023-08-04 VITALS
WEIGHT: 190.6 LBS | TEMPERATURE: 96.7 F | SYSTOLIC BLOOD PRESSURE: 110 MMHG | RESPIRATION RATE: 16 BRPM | HEIGHT: 65 IN | BODY MASS INDEX: 31.75 KG/M2 | HEART RATE: 85 BPM | DIASTOLIC BLOOD PRESSURE: 82 MMHG | OXYGEN SATURATION: 98 %

## 2023-08-04 DIAGNOSIS — R39.9 UTI SYMPTOMS: Primary | ICD-10-CM

## 2023-08-04 DIAGNOSIS — N39.0 URINARY TRACT INFECTION WITHOUT HEMATURIA, SITE UNSPECIFIED: ICD-10-CM

## 2023-08-04 DIAGNOSIS — S39.012A BACK STRAIN, INITIAL ENCOUNTER: ICD-10-CM

## 2023-08-04 DIAGNOSIS — E66.09 CLASS 1 OBESITY DUE TO EXCESS CALORIES WITH BODY MASS INDEX (BMI) OF 31.0 TO 31.9 IN ADULT, UNSPECIFIED WHETHER SERIOUS COMORBIDITY PRESENT: ICD-10-CM

## 2023-08-04 DIAGNOSIS — Z12.31 ENCOUNTER FOR SCREENING MAMMOGRAM FOR MALIGNANT NEOPLASM OF BREAST: ICD-10-CM

## 2023-08-04 LAB
BILIRUB BLD-MCNC: NEGATIVE MG/DL
CLARITY, POC: CLEAR
COLOR UR: YELLOW
EXPIRATION DATE: ABNORMAL
GLUCOSE UR STRIP-MCNC: NEGATIVE MG/DL
KETONES UR QL: NEGATIVE
LEUKOCYTE EST, POC: ABNORMAL
Lab: ABNORMAL
NITRITE UR-MCNC: NEGATIVE MG/ML
PH UR: 6 [PH] (ref 5–8)
PROT UR STRIP-MCNC: NEGATIVE MG/DL
RBC # UR STRIP: NEGATIVE /UL
SP GR UR: 1.02 (ref 1–1.03)
UROBILINOGEN UR QL: ABNORMAL

## 2023-08-04 PROCEDURE — 87086 URINE CULTURE/COLONY COUNT: CPT | Performed by: NURSE PRACTITIONER

## 2023-08-04 RX ORDER — LORAZEPAM 1 MG/1
TABLET ORAL
COMMUNITY
Start: 2023-07-21

## 2023-08-04 RX ORDER — PHENAZOPYRIDINE HYDROCHLORIDE 100 MG/1
100 TABLET, FILM COATED ORAL 3 TIMES DAILY PRN
Qty: 10 TABLET | Refills: 0 | Status: SHIPPED | OUTPATIENT
Start: 2023-08-04

## 2023-08-04 RX ORDER — NITROFURANTOIN 25; 75 MG/1; MG/1
100 CAPSULE ORAL 2 TIMES DAILY
Qty: 14 CAPSULE | Refills: 0 | Status: SHIPPED | OUTPATIENT
Start: 2023-08-04

## 2023-08-04 RX ORDER — METHYLPREDNISOLONE 4 MG/1
TABLET ORAL
Qty: 21 TABLET | Refills: 0 | Status: SHIPPED | OUTPATIENT
Start: 2023-08-04

## 2023-08-05 LAB — BACTERIA SPEC AEROBE CULT: NO GROWTH

## 2023-08-18 ENCOUNTER — HOSPITAL ENCOUNTER (OUTPATIENT)
Dept: MAMMOGRAPHY | Facility: HOSPITAL | Age: 48
Discharge: HOME | End: 2023-08-18
Admitting: NURSE PRACTITIONER
Payer: COMMERCIAL

## 2023-08-18 PROCEDURE — 77063 BREAST TOMOSYNTHESIS BI: CPT

## 2023-08-18 PROCEDURE — 77067 SCR MAMMO BI INCL CAD: CPT

## 2023-08-21 ENCOUNTER — PATIENT MESSAGE (OUTPATIENT)
Dept: FAMILY MEDICINE CLINIC | Facility: CLINIC | Age: 48
End: 2023-08-21
Payer: COMMERCIAL

## 2023-08-21 NOTE — TELEPHONE ENCOUNTER
From: Angelika Shukla  To: Jesse Mukherjee  Sent: 8/21/2023 9:21 AM EDT  Subject: Referral    I filled out the packet for the [facility] doctor. He says I'm not a candidate. So, as we talked, can I try the phentermine again?

## 2023-08-25 ENCOUNTER — CLINICAL SUPPORT (OUTPATIENT)
Dept: FAMILY MEDICINE CLINIC | Facility: CLINIC | Age: 48
End: 2023-08-25
Payer: COMMERCIAL

## 2023-08-25 DIAGNOSIS — E66.3 OVERWEIGHT WITH BODY MASS INDEX (BMI) OF 27 TO 27.9 IN ADULT: ICD-10-CM

## 2023-08-25 DIAGNOSIS — R30.0 DYSURIA: Primary | ICD-10-CM

## 2023-08-25 DIAGNOSIS — Z51.81 ENCOUNTER FOR MEDICATION MONITORING: ICD-10-CM

## 2023-08-25 LAB
AMPHET+METHAMPHET UR QL: NEGATIVE
AMPHETAMINE INTERNAL CONTROL: NORMAL
AMPHETAMINES UR QL: NEGATIVE
BARBITURATE INTERNAL CONTROL: NORMAL
BARBITURATES UR QL SCN: NEGATIVE
BENZODIAZ UR QL SCN: NEGATIVE
BENZODIAZEPINE INTERNAL CONTROL: NORMAL
BILIRUB BLD-MCNC: NEGATIVE MG/DL
BUPRENORPHINE INTERNAL CONTROL: NORMAL
BUPRENORPHINE SERPL-MCNC: NEGATIVE NG/ML
CANNABINOIDS SERPL QL: NEGATIVE
CLARITY, POC: CLEAR
COCAINE INTERNAL CONTROL: NORMAL
COCAINE UR QL: NEGATIVE
COLOR UR: YELLOW
EXPIRATION DATE: ABNORMAL
EXPIRATION DATE: NORMAL
GLUCOSE UR STRIP-MCNC: NEGATIVE MG/DL
KETONES UR QL: NEGATIVE
LEUKOCYTE EST, POC: ABNORMAL
Lab: ABNORMAL
Lab: NORMAL
MDMA (ECSTASY) INTERNAL CONTROL: NORMAL
MDMA UR QL SCN: NEGATIVE
METHADONE INTERNAL CONTROL: NORMAL
METHADONE UR QL SCN: NEGATIVE
METHAMPHETAMINE INTERNAL CONTROL: NORMAL
NITRITE UR-MCNC: NEGATIVE MG/ML
OPIATES INTERNAL CONTROL: NORMAL
OPIATES UR QL: NEGATIVE
OXYCODONE INTERNAL CONTROL: NORMAL
OXYCODONE UR QL SCN: NEGATIVE
PCP UR QL SCN: NEGATIVE
PH UR: 5.5 [PH] (ref 5–8)
PHENCYCLIDINE INTERNAL CONTROL: NORMAL
PROT UR STRIP-MCNC: NEGATIVE MG/DL
RBC # UR STRIP: NEGATIVE /UL
SP GR UR: 1.03 (ref 1–1.03)
THC INTERNAL CONTROL: NORMAL
UROBILINOGEN UR QL: NORMAL

## 2023-08-25 PROCEDURE — 87086 URINE CULTURE/COLONY COUNT: CPT | Performed by: NURSE PRACTITIONER

## 2023-08-25 RX ORDER — PHENAZOPYRIDINE HYDROCHLORIDE 100 MG/1
100 TABLET, FILM COATED ORAL 3 TIMES DAILY PRN
Qty: 9 TABLET | Refills: 1 | Status: SHIPPED | OUTPATIENT
Start: 2023-08-25

## 2023-08-25 RX ORDER — PHENTERMINE HYDROCHLORIDE 37.5 MG/1
37.5 CAPSULE ORAL EVERY MORNING
Qty: 30 CAPSULE | Refills: 0 | Status: SHIPPED | OUTPATIENT
Start: 2023-08-25

## 2023-08-26 LAB — BACTERIA SPEC AEROBE CULT: NO GROWTH

## 2023-09-28 ENCOUNTER — OFFICE VISIT (OUTPATIENT)
Dept: FAMILY MEDICINE CLINIC | Facility: CLINIC | Age: 48
End: 2023-09-28
Payer: COMMERCIAL

## 2023-09-28 VITALS
BODY MASS INDEX: 30.22 KG/M2 | RESPIRATION RATE: 19 BRPM | HEIGHT: 65 IN | OXYGEN SATURATION: 98 % | DIASTOLIC BLOOD PRESSURE: 70 MMHG | WEIGHT: 181.4 LBS | SYSTOLIC BLOOD PRESSURE: 107 MMHG | TEMPERATURE: 97.6 F | HEART RATE: 74 BPM

## 2023-09-28 DIAGNOSIS — M54.50 ACUTE LOW BACK PAIN WITHOUT SCIATICA, UNSPECIFIED BACK PAIN LATERALITY: ICD-10-CM

## 2023-09-28 DIAGNOSIS — G43.009 MIGRAINE WITHOUT AURA AND WITHOUT STATUS MIGRAINOSUS, NOT INTRACTABLE: ICD-10-CM

## 2023-09-28 DIAGNOSIS — K31.9 STOMACH DISORDER: ICD-10-CM

## 2023-09-28 DIAGNOSIS — Z00.00 ANNUAL PHYSICAL EXAM: Primary | ICD-10-CM

## 2023-09-28 DIAGNOSIS — M54.41 ACUTE RIGHT-SIDED LOW BACK PAIN WITH RIGHT-SIDED SCIATICA: ICD-10-CM

## 2023-09-28 DIAGNOSIS — E66.3 OVERWEIGHT WITH BODY MASS INDEX (BMI) OF 27 TO 27.9 IN ADULT: ICD-10-CM

## 2023-09-28 RX ORDER — PHENTERMINE HYDROCHLORIDE 37.5 MG/1
37.5 CAPSULE ORAL EVERY MORNING
Qty: 30 CAPSULE | Refills: 0 | Status: SHIPPED | OUTPATIENT
Start: 2023-09-28

## 2023-09-28 RX ORDER — TRAMADOL HYDROCHLORIDE 50 MG/1
50 TABLET ORAL EVERY 6 HOURS PRN
Qty: 30 TABLET | Refills: 0 | Status: SHIPPED | OUTPATIENT
Start: 2023-09-28

## 2023-09-28 RX ORDER — BUTALBITAL, ACETAMINOPHEN AND CAFFEINE 50; 325; 40 MG/1; MG/1; MG/1
1 TABLET ORAL EVERY 6 HOURS PRN
Qty: 120 TABLET | Refills: 0 | Status: SHIPPED | OUTPATIENT
Start: 2023-09-28

## 2023-09-28 NOTE — PROGRESS NOTES
Chief Complaint  Annual Exam and Weight Check    Subjective        Angelika Shukla presents to [facility]  History of Present Illness  Annual exam:  has been losing weight.  Has lost 9 pounds since beginning of August.  Denies chest pain or shortness of breath.    Low back pain after having to lay on the floor with her dogs who were afraid of storms.    The following portions of the patient's history were personally reviewed and updated as appropriate: allergies, current medications, past medical history, past surgical history, past family history, and past social history.     Body mass index is 30.19 kg/m².           Past History:    Medical History: has a past medical history of Allergies, Anxiety (2014), Asthma, Bowel disease, Broken bones, Crohn disease, Diverticulitis, Diverticulosis (2010), Inflammatory bowel disease (2010), Irritable bowel syndrome (2010), Knee pain, Labral tear of shoulder, left, initial encounter (10/19/2015), Medial meniscus tear (05/17/2016), Migraines, Other abnormal glucose (ulcer), Patellar instability of right knee (07/29/2016), Peptic ulceration (2005), Shoulder impingement (11/19/2014), Shoulder pain, left (09/16/2015), Shoulder tendonitis (09/24/2014), and Stomach disorder.     Surgical History: has a past surgical history that includes Colonoscopy (2012); Hysterectomy; Knee arthroscopy, medial patello femoral ligament repair; Breast reconstruction (Bilateral); and Shoulder AC Joint Repair.     Family History: family history includes Breast cancer in her maternal grandmother; COPD in her mother; Heart disease in her maternal grandmother; Stroke in her father, maternal grandfather, and maternal grandmother.     Social History: reports that she has never smoked. She has never used smokeless tobacco. She reports that she does not drink alcohol and does not use drugs.    Allergies: Hydrocodone, Cephalosporins, Erythromycin, Hydrocodone-acetaminophen,  Semaglutide(0.25 or 0.5mg-dos), and Sulfa antibiotics          Current Outpatient Medications:     butalbital-acetaminophen-caffeine (FIORICET, ESGIC) -40 MG per tablet, Take 1 tablet by mouth Every 6 (Six) Hours As Needed for Headache., Disp: 120 tablet, Rfl: 0    nexIUM 40 MG capsule, Take 1 capsule by mouth Daily., Disp: 30 capsule, Rfl: 1    phentermine 37.5 MG capsule, Take 1 capsule by mouth Every Morning., Disp: 30 capsule, Rfl: 0    traMADol (ULTRAM) 50 MG tablet, Take 1 tablet by mouth Every 6 (Six) Hours As Needed for Moderate Pain., Disp: 30 tablet, Rfl: 0    Bacillus Coagulans-Inulin (Probiotic) 1-250 BILLION-MG capsule, Probiotic 100 billion cell oral capsule take 1 capsule by oral route daily for 30 days   Suspended, Disp: , Rfl:     busPIRone (BUSPAR) 30 MG tablet, TAKE 1 TABLET BY MOUTH TWICE A DAY AS DIRECTED, Disp: , Rfl:     cyclobenzaprine (FLEXERIL) 10 MG tablet, Take 1 tablet by mouth 3 (Three) Times a Day As Needed for Muscle Spasms., Disp: 60 tablet, Rfl: 1    dicyclomine (BENTYL) 20 MG tablet, Take  by mouth., Disp: , Rfl:     eszopiclone (LUNESTA) 3 MG tablet, Take 1 tablet by mouth every night at bedtime., Disp: , Rfl:     hydroCHLOROthiazide (HYDRODIURIL) 12.5 MG tablet, Take 1 tablet by mouth Daily., Disp: 30 tablet, Rfl: 1    hydrOXYzine (ATARAX) 50 MG tablet, TAKE ONE TABLET BY MOUTH THREE TIMES PER DAY AS NEEDED FOR ANXIETY, Disp: , Rfl:     Hyoscyamine Sulfate SL 0.125 MG sublingual tablet, Place 125 mcg under the tongue., Disp: , Rfl:     linaclotide (LINZESS) 290 MCG capsule capsule, Take 1 capsule by mouth Daily., Disp: , Rfl:     LORazepam (ATIVAN) 1 MG tablet, TAKE 1 TABLET BY MOUTH ONCE A DAY AS NEEDED FOR SEVERE ANXIETY, Disp: , Rfl:     lubiprostone (AMITIZA) 24 MCG capsule, Take 1 capsule by mouth 2 (Two) Times a Day With Meals., Disp: 60 capsule, Rfl: 0    meloxicam (MOBIC) 15 MG tablet, Take 1 tablet by mouth Daily., Disp: , Rfl:     Meth-Hyo-M Bl-Na Phos-Ph Sal  (Uro-MP) 118 MG capsule, TAKE ONE CAPSULE BY MOUTH FOUR TIMES A DAY AS NEEDED FOR 3 DAYS, Disp: , Rfl:     methylPREDNISolone (MEDROL) 4 MG dose pack, Take as directed on package instructions., Disp: 21 tablet, Rfl: 0    Mirabegron ER (MYRBETRIQ) 50 MG tablet sustained-release 24 hour 24 hr tablet, Take 50 mg by mouth Daily., Disp: 90 tablet, Rfl: 1    nitrofurantoin, macrocrystal-monohydrate, (Macrobid) 100 MG capsule, Take 1 capsule by mouth 2 (Two) Times a Day., Disp: 14 capsule, Rfl: 0    Nurtec 75 MG dispersible tablet, PLACE 1 TABLET ON TONGUE, ALLOW TO DISSOLVE THEN SWALLOW ONCE AS NEEDED FOR MIGRAINE; MAX 1 DOSE/24 HRS, Disp: 8 tablet, Rfl: 5    ondansetron ODT (ZOFRAN-ODT) 4 MG disintegrating tablet, TAKE 1 TABLET BY MOUTH EVERY 8 HOURS IF NEEDED FOR NAUSEA OR VOMITING FOR UP TO 7 DAYS., Disp: , Rfl:     PARoxetine (PAXIL) 30 MG tablet, TAKE TWO TABLETS EVERY NIGHT, Disp: , Rfl:     phenazopyridine (Pyridium) 100 MG tablet, Take 1 tablet by mouth 3 (Three) Times a Day As Needed for Bladder Spasms., Disp: 9 tablet, Rfl: 1    prazosin (MINIPRESS) 2 MG capsule, TAKE ONE CAPSULE BY MOUTH AT BEDTIME FOR NIGHTMARES, Disp: , Rfl:     promethazine-dextromethorphan (PROMETHAZINE-DM) 6.25-15 MG/5ML syrup, Take 5 mL by mouth 4 (Four) Times a Day As Needed for Cough., Disp: 180 mL, Rfl: 0    propranolol (INDERAL) 10 MG tablet, Take 1 tablet by mouth 3 (Three) Times a Day As Needed. for anxiety, Disp: , Rfl:     topiramate (TOPAMAX) 50 MG tablet, Take 1 tablet by mouth 2 (Two) Times a Day., Disp: 180 tablet, Rfl: 1    Medications Discontinued During This Encounter   Medication Reason    butalbital-acetaminophen-caffeine (FIORICET, ESGIC) -40 MG per tablet Reorder    traMADol (ULTRAM) 50 MG tablet Reorder    nexIUM 40 MG capsule Reorder    phentermine 37.5 MG capsule Reorder         Review of Systems   Constitutional:  Negative for fever.   Respiratory:  Negative for cough and shortness of breath.   "  Cardiovascular:  Negative for chest pain, palpitations and leg swelling.   Neurological:  Negative for dizziness, weakness, numbness and headache.      Objective         Vitals:    09/28/23 0912   BP: 107/70   Pulse: 74   Resp: 19   Temp: 97.6 °F (36.4 °C)   SpO2: 98%   Weight: 82.3 kg (181 lb 6.4 oz)   Height: 165.1 cm (65\")     Body mass index is 30.19 kg/m².         Physical Exam  Vitals reviewed.   Constitutional:       Appearance: Normal appearance. She is well-developed.   HENT:      Head: Normocephalic and atraumatic.      Mouth/Throat:      Pharynx: No oropharyngeal exudate.   Eyes:      Conjunctiva/sclera: Conjunctivae normal.      Pupils: Pupils are equal, round, and reactive to light.   Cardiovascular:      Rate and Rhythm: Normal rate and regular rhythm.      Heart sounds: Normal heart sounds. No murmur heard.    No friction rub. No gallop.   Pulmonary:      Effort: Pulmonary effort is normal.      Breath sounds: Normal breath sounds. No wheezing or rhonchi.   Musculoskeletal:      Cervical back: Normal range of motion.   Skin:     General: Skin is warm and dry.   Neurological:      Mental Status: She is alert and oriented to person, place, and time.   Psychiatric:         Mood and Affect: Mood and affect normal.         Behavior: Behavior normal.         Thought Content: Thought content normal.         Judgment: Judgment normal.           Result Review :               Assessment and Plan     Diagnoses and all orders for this visit:    1. Annual physical exam (Primary)  Comments:  discussed diet and exercise.    2. Migraine without aura and without status migrainosus, not intractable  Comments:  continue nurtec and will appeal to get for patient if able.  Orders:  -     butalbital-acetaminophen-caffeine (FIORICET, ESGIC) -40 MG per tablet; Take 1 tablet by mouth Every 6 (Six) Hours As Needed for Headache.  Dispense: 120 tablet; Refill: 0    3. Overweight with body mass index (BMI) of 27 to 27.9 " in adult  -     phentermine 37.5 MG capsule; Take 1 capsule by mouth Every Morning.  Dispense: 30 capsule; Refill: 0    4. Stomach disorder  -     nexIUM 40 MG capsule; Take 1 capsule by mouth Daily.  Dispense: 30 capsule; Refill: 1    5. Acute right-sided low back pain with right-sided sciatica    6. Acute low back pain without sciatica, unspecified back pain laterality  -     traMADol (ULTRAM) 50 MG tablet; Take 1 tablet by mouth Every 6 (Six) Hours As Needed for Moderate Pain.  Dispense: 30 tablet; Refill: 0              Follow Up     Return in about 1 month (around 10/28/2023).    Patient was given instructions and counseling regarding her condition or for health maintenance advice. Please see specific information pulled into the AVS if appropriate.

## 2023-11-17 ENCOUNTER — OFFICE VISIT (OUTPATIENT)
Dept: FAMILY MEDICINE CLINIC | Facility: CLINIC | Age: 48
End: 2023-11-17
Payer: COMMERCIAL

## 2023-11-17 VITALS
TEMPERATURE: 96.5 F | WEIGHT: 177.2 LBS | SYSTOLIC BLOOD PRESSURE: 130 MMHG | BODY MASS INDEX: 29.52 KG/M2 | HEART RATE: 50 BPM | OXYGEN SATURATION: 100 % | DIASTOLIC BLOOD PRESSURE: 86 MMHG | RESPIRATION RATE: 16 BRPM | HEIGHT: 65 IN

## 2023-11-17 DIAGNOSIS — E66.3 OVERWEIGHT WITH BODY MASS INDEX (BMI) OF 27 TO 27.9 IN ADULT: ICD-10-CM

## 2023-11-17 DIAGNOSIS — N32.81 OAB (OVERACTIVE BLADDER): ICD-10-CM

## 2023-11-17 PROCEDURE — 99213 OFFICE O/P EST LOW 20 MIN: CPT | Performed by: NURSE PRACTITIONER

## 2023-11-17 RX ORDER — PHENTERMINE HYDROCHLORIDE 37.5 MG/1
37.5 CAPSULE ORAL EVERY MORNING
Qty: 30 CAPSULE | Refills: 0 | Status: SHIPPED | OUTPATIENT
Start: 2023-11-17

## 2023-11-17 NOTE — PROGRESS NOTES
Chief Complaint  Obesity and Urine Leakage    Subjective        Angelika Shukla presents to [facility]  History of Present Illness  Obesity:  has lost 4 pounds.  Started Octiva for eating.    Urine leakage.  Needs myrbetriq refilled but denied through insurance.      Obesity  Pertinent negatives include no chest pain, coughing, fever, numbness or weakness.       The following portions of the patient's history were personally reviewed and updated as appropriate: allergies, current medications, past medical history, past surgical history, past family history, and past social history.     Body mass index is 29.49 kg/m².           Past History:    Medical History: has a past medical history of Allergies, Anxiety (2014), Asthma, Bowel disease, Broken bones, Crohn disease, Diverticulitis, Diverticulosis (2010), Inflammatory bowel disease (2010), Irritable bowel syndrome (2010), Knee pain, Labral tear of shoulder, left, initial encounter (10/19/2015), Medial meniscus tear (05/17/2016), Migraines, Other abnormal glucose (ulcer), Patellar instability of right knee (07/29/2016), Peptic ulceration (2005), Shoulder impingement (11/19/2014), Shoulder pain, left (09/16/2015), Shoulder tendonitis (09/24/2014), and Stomach disorder.     Surgical History: has a past surgical history that includes Colonoscopy (2012); Hysterectomy; Knee arthroscopy, medial patello femoral ligament repair; Breast reconstruction (Bilateral); and Shoulder AC Joint Repair.     Family History: family history includes Breast cancer in her maternal grandmother; COPD in her mother; Heart disease in her maternal grandmother; Stroke in her father, maternal grandfather, and maternal grandmother.     Social History: reports that she has never smoked. She has never used smokeless tobacco. She reports that she does not drink alcohol and does not use drugs.    Allergies: Hydrocodone, Cephalosporins, Erythromycin,  Hydrocodone-acetaminophen, Semaglutide(0.25 or 0.5mg-dos), and Sulfa antibiotics          Current Outpatient Medications:     Bacillus Coagulans-Inulin (Probiotic) 1-250 BILLION-MG capsule, Probiotic 100 billion cell oral capsule take 1 capsule by oral route daily for 30 days   Suspended, Disp: , Rfl:     busPIRone (BUSPAR) 30 MG tablet, TAKE 1 TABLET BY MOUTH TWICE A DAY AS DIRECTED, Disp: , Rfl:     butalbital-acetaminophen-caffeine (FIORICET, ESGIC) -40 MG per tablet, Take 1 tablet by mouth Every 6 (Six) Hours As Needed for Headache., Disp: 120 tablet, Rfl: 0    dicyclomine (BENTYL) 20 MG tablet, Take  by mouth., Disp: , Rfl:     eszopiclone (LUNESTA) 3 MG tablet, Take 1 tablet by mouth every night at bedtime., Disp: , Rfl:     hydrOXYzine (ATARAX) 50 MG tablet, TAKE ONE TABLET BY MOUTH THREE TIMES PER DAY AS NEEDED FOR ANXIETY, Disp: , Rfl:     Hyoscyamine Sulfate SL 0.125 MG sublingual tablet, Place 125 mcg under the tongue., Disp: , Rfl:     linaclotide (LINZESS) 290 MCG capsule capsule, Take 1 capsule by mouth Daily., Disp: , Rfl:     LORazepam (ATIVAN) 1 MG tablet, TAKE 1 TABLET BY MOUTH ONCE A DAY AS NEEDED FOR SEVERE ANXIETY, Disp: , Rfl:     lubiprostone (AMITIZA) 24 MCG capsule, Take 1 capsule by mouth 2 (Two) Times a Day With Meals., Disp: 60 capsule, Rfl: 0    Mirabegron ER (MYRBETRIQ) 50 MG tablet sustained-release 24 hour 24 hr tablet, Take 50 mg by mouth Daily., Disp: 90 tablet, Rfl: 1    nexIUM 40 MG capsule, Take 1 capsule by mouth Daily., Disp: 30 capsule, Rfl: 1    Nurtec 75 MG dispersible tablet, PLACE 1 TABLET ON TONGUE, ALLOW TO DISSOLVE THEN SWALLOW ONCE AS NEEDED FOR MIGRAINE; MAX 1 DOSE/24 HRS, Disp: 8 tablet, Rfl: 5    ondansetron ODT (ZOFRAN-ODT) 4 MG disintegrating tablet, TAKE 1 TABLET BY MOUTH EVERY 8 HOURS IF NEEDED FOR NAUSEA OR VOMITING FOR UP TO 7 DAYS., Disp: , Rfl:     PARoxetine (PAXIL) 30 MG tablet, TAKE TWO TABLETS EVERY NIGHT, Disp: , Rfl:     phentermine 37.5 MG  "capsule, Take 1 capsule by mouth Every Morning., Disp: 30 capsule, Rfl: 0    prazosin (MINIPRESS) 2 MG capsule, TAKE ONE CAPSULE BY MOUTH AT BEDTIME FOR NIGHTMARES, Disp: , Rfl:     propranolol (INDERAL) 10 MG tablet, Take 1 tablet by mouth 3 (Three) Times a Day As Needed. for anxiety, Disp: , Rfl:     topiramate (TOPAMAX) 50 MG tablet, Take 1 tablet by mouth 2 (Two) Times a Day., Disp: 180 tablet, Rfl: 1    traMADol (ULTRAM) 50 MG tablet, Take 1 tablet by mouth Every 6 (Six) Hours As Needed for Moderate Pain., Disp: 30 tablet, Rfl: 0    Medications Discontinued During This Encounter   Medication Reason    Meth-Hyo-M Bl-Na Phos-Ph Sal (Uro-MP) 118 MG capsule *Therapy completed    meloxicam (MOBIC) 15 MG tablet *Therapy completed    hydroCHLOROthiazide (HYDRODIURIL) 12.5 MG tablet *Therapy completed    cyclobenzaprine (FLEXERIL) 10 MG tablet *Therapy completed    promethazine-dextromethorphan (PROMETHAZINE-DM) 6.25-15 MG/5ML syrup *Therapy completed    nitrofurantoin, macrocrystal-monohydrate, (Macrobid) 100 MG capsule *Therapy completed    methylPREDNISolone (MEDROL) 4 MG dose pack *Therapy completed    phenazopyridine (Pyridium) 100 MG tablet *Therapy completed    Mirabegron ER (MYRBETRIQ) 50 MG tablet sustained-release 24 hour 24 hr tablet Reorder    phentermine 37.5 MG capsule Reorder         Review of Systems   Constitutional:  Negative for fever.   Respiratory:  Negative for cough and shortness of breath.    Cardiovascular:  Negative for chest pain, palpitations and leg swelling.   Neurological:  Negative for dizziness, weakness, numbness and headache.        Objective         Vitals:    11/17/23 1535   BP: 130/86   BP Location: Right arm   Patient Position: Sitting   Cuff Size: Adult   Pulse: 50   Resp: 16   Temp: 96.5 °F (35.8 °C)   TempSrc: Temporal   SpO2: 100%   Weight: 80.4 kg (177 lb 3.2 oz)   Height: 165.1 cm (65\")     Body mass index is 29.49 kg/m².         Physical Exam  Vitals reviewed. "   Constitutional:       Appearance: Normal appearance. She is well-developed.   HENT:      Head: Normocephalic and atraumatic.      Mouth/Throat:      Pharynx: No oropharyngeal exudate.   Eyes:      Conjunctiva/sclera: Conjunctivae normal.      Pupils: Pupils are equal, round, and reactive to light.   Cardiovascular:      Rate and Rhythm: Normal rate and regular rhythm.      Heart sounds: Normal heart sounds. No murmur heard.     No friction rub. No gallop.   Pulmonary:      Effort: Pulmonary effort is normal.      Breath sounds: Normal breath sounds. No wheezing or rhonchi.   Skin:     General: Skin is warm and dry.   Neurological:      Mental Status: She is alert and oriented to person, place, and time.   Psychiatric:         Mood and Affect: Mood and affect normal.         Behavior: Behavior normal.         Thought Content: Thought content normal.         Judgment: Judgment normal.             Result Review :               Assessment and Plan     Diagnoses and all orders for this visit:    1. Overweight with body mass index (BMI) of 27 to 27.9 in adult  -     phentermine 37.5 MG capsule; Take 1 capsule by mouth Every Morning.  Dispense: 30 capsule; Refill: 0    2. OAB (overactive bladder)  -     Mirabegron ER (MYRBETRIQ) 50 MG tablet sustained-release 24 hour 24 hr tablet; Take 50 mg by mouth Daily.  Dispense: 90 tablet; Refill: 1              Follow Up     Return in about 1 month (around 12/17/2023).    Patient was given instructions and counseling regarding her condition or for health maintenance advice. Please see specific information pulled into the AVS if appropriate.

## 2023-11-19 ENCOUNTER — PATIENT MESSAGE (OUTPATIENT)
Dept: FAMILY MEDICINE CLINIC | Facility: CLINIC | Age: 48
End: 2023-11-19
Payer: COMMERCIAL

## 2023-11-19 DIAGNOSIS — T14.8XXA MUSCLE STRAIN: Primary | ICD-10-CM

## 2023-11-20 ENCOUNTER — TELEPHONE (OUTPATIENT)
Dept: FAMILY MEDICINE CLINIC | Facility: CLINIC | Age: 48
End: 2023-11-20
Payer: COMMERCIAL

## 2023-11-20 DIAGNOSIS — T14.8XXA MUSCLE STRAIN: ICD-10-CM

## 2023-11-20 RX ORDER — BACLOFEN 10 MG/1
10 TABLET ORAL 3 TIMES DAILY
Qty: 40 TABLET | Refills: 1 | Status: SHIPPED | OUTPATIENT
Start: 2023-11-20

## 2023-11-20 RX ORDER — BACLOFEN 10 MG/1
10 TABLET ORAL 3 TIMES DAILY
Qty: 40 TABLET | Refills: 1 | Status: SHIPPED | OUTPATIENT
Start: 2023-11-20 | End: 2023-11-20 | Stop reason: SDUPTHER

## 2023-11-20 NOTE — TELEPHONE ENCOUNTER
----- Message from Angelika Shukla sent at 11/20/2023  3:34 PM EST -----  Regarding: Pain  Contact: [phone]  [facility] did not have the medicine that you said you were calling in. Also need PA on mybertic
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
## 2023-12-13 ENCOUNTER — OFFICE VISIT (OUTPATIENT)
Dept: FAMILY MEDICINE CLINIC | Facility: CLINIC | Age: 48
End: 2023-12-13
Payer: COMMERCIAL

## 2023-12-13 VITALS
WEIGHT: 175.2 LBS | HEART RATE: 86 BPM | SYSTOLIC BLOOD PRESSURE: 130 MMHG | RESPIRATION RATE: 16 BRPM | DIASTOLIC BLOOD PRESSURE: 88 MMHG | BODY MASS INDEX: 29.19 KG/M2 | HEIGHT: 65 IN | OXYGEN SATURATION: 99 % | TEMPERATURE: 96.2 F

## 2023-12-13 DIAGNOSIS — E66.3 OVERWEIGHT WITH BODY MASS INDEX (BMI) OF 27 TO 27.9 IN ADULT: ICD-10-CM

## 2023-12-13 PROCEDURE — 99213 OFFICE O/P EST LOW 20 MIN: CPT | Performed by: NURSE PRACTITIONER

## 2023-12-13 RX ORDER — PHENTERMINE HYDROCHLORIDE 37.5 MG/1
37.5 CAPSULE ORAL EVERY MORNING
Qty: 30 CAPSULE | Refills: 0 | Status: SHIPPED | OUTPATIENT
Start: 2023-12-13

## 2023-12-13 NOTE — PROGRESS NOTES
Chief Complaint  Obesity    Subjective        Angelika Shukla presents to [facility]  History of Present Illness  Obesity:  has lost 2 pounds in the last month.  Denies chest pain shortness of breath.  Obesity  Pertinent negatives include no chest pain, coughing, fever, numbness or weakness.       The following portions of the patient's history were personally reviewed and updated as appropriate: allergies, current medications, past medical history, past surgical history, past family history, and past social history.     Body mass index is 29.15 kg/m².           Past History:    Medical History: has a past medical history of Allergies, Anxiety (2014), Asthma, Bowel disease, Broken bones, Crohn disease, Diverticulitis, Diverticulosis (2010), Inflammatory bowel disease (2010), Irritable bowel syndrome (2010), Knee pain, Labral tear of shoulder, left, initial encounter (10/19/2015), Medial meniscus tear (05/17/2016), Migraines, Other abnormal glucose (ulcer), Patellar instability of right knee (07/29/2016), Peptic ulceration (2005), Shoulder impingement (11/19/2014), Shoulder pain, left (09/16/2015), Shoulder tendonitis (09/24/2014), and Stomach disorder.     Surgical History: has a past surgical history that includes Colonoscopy (2012); Hysterectomy; Knee arthroscopy, medial patello femoral ligament repair; Breast reconstruction (Bilateral); and Shoulder AC Joint Repair.     Family History: family history includes Breast cancer in her maternal grandmother; COPD in her mother; Heart disease in her maternal grandmother; Stroke in her father, maternal grandfather, and maternal grandmother.     Social History: reports that she has never smoked. She has never used smokeless tobacco. She reports that she does not drink alcohol and does not use drugs.    Allergies: Hydrocodone, Cephalosporins, Erythromycin, Hydrocodone-acetaminophen, Semaglutide(0.25 or 0.5mg-dos), and Sulfa  antibiotics          Current Outpatient Medications:     Bacillus Coagulans-Inulin (Probiotic) 1-250 BILLION-MG capsule, Probiotic 100 billion cell oral capsule take 1 capsule by oral route daily for 30 days   Suspended, Disp: , Rfl:     baclofen (LIORESAL) 10 MG tablet, Take 1 tablet by mouth 3 (Three) Times a Day., Disp: 40 tablet, Rfl: 1    busPIRone (BUSPAR) 30 MG tablet, TAKE 1 TABLET BY MOUTH TWICE A DAY AS DIRECTED, Disp: , Rfl:     butalbital-acetaminophen-caffeine (FIORICET, ESGIC) -40 MG per tablet, Take 1 tablet by mouth Every 6 (Six) Hours As Needed for Headache., Disp: 120 tablet, Rfl: 0    dicyclomine (BENTYL) 20 MG tablet, Take  by mouth., Disp: , Rfl:     eszopiclone (LUNESTA) 3 MG tablet, Take 1 tablet by mouth every night at bedtime., Disp: , Rfl:     hydrOXYzine (ATARAX) 50 MG tablet, TAKE ONE TABLET BY MOUTH THREE TIMES PER DAY AS NEEDED FOR ANXIETY, Disp: , Rfl:     Hyoscyamine Sulfate SL 0.125 MG sublingual tablet, Place 125 mcg under the tongue., Disp: , Rfl:     linaclotide (LINZESS) 290 MCG capsule capsule, Take 1 capsule by mouth Daily., Disp: , Rfl:     LORazepam (ATIVAN) 1 MG tablet, TAKE 1 TABLET BY MOUTH ONCE A DAY AS NEEDED FOR SEVERE ANXIETY, Disp: , Rfl:     lubiprostone (AMITIZA) 24 MCG capsule, Take 1 capsule by mouth 2 (Two) Times a Day With Meals., Disp: 60 capsule, Rfl: 0    Mirabegron ER (MYRBETRIQ) 50 MG tablet sustained-release 24 hour 24 hr tablet, Take 50 mg by mouth Daily., Disp: 90 tablet, Rfl: 1    nexIUM 40 MG capsule, Take 1 capsule by mouth Daily., Disp: 30 capsule, Rfl: 1    Nurtec 75 MG dispersible tablet, PLACE 1 TABLET ON TONGUE, ALLOW TO DISSOLVE THEN SWALLOW ONCE AS NEEDED FOR MIGRAINE; MAX 1 DOSE/24 HRS, Disp: 8 tablet, Rfl: 5    ondansetron ODT (ZOFRAN-ODT) 4 MG disintegrating tablet, TAKE 1 TABLET BY MOUTH EVERY 8 HOURS IF NEEDED FOR NAUSEA OR VOMITING FOR UP TO 7 DAYS., Disp: , Rfl:     PARoxetine (PAXIL) 30 MG tablet, TAKE TWO TABLETS EVERY NIGHT,  "Disp: , Rfl:     phentermine 37.5 MG capsule, Take 1 capsule by mouth Every Morning., Disp: 30 capsule, Rfl: 0    prazosin (MINIPRESS) 2 MG capsule, TAKE ONE CAPSULE BY MOUTH AT BEDTIME FOR NIGHTMARES, Disp: , Rfl:     propranolol (INDERAL) 10 MG tablet, Take 1 tablet by mouth 3 (Three) Times a Day As Needed. for anxiety, Disp: , Rfl:     topiramate (TOPAMAX) 50 MG tablet, Take 1 tablet by mouth 2 (Two) Times a Day., Disp: 180 tablet, Rfl: 1    traMADol (ULTRAM) 50 MG tablet, Take 1 tablet by mouth Every 6 (Six) Hours As Needed for Moderate Pain., Disp: 30 tablet, Rfl: 0    Medications Discontinued During This Encounter   Medication Reason    phentermine 37.5 MG capsule Reorder         Review of Systems   Constitutional:  Negative for fever.   Respiratory:  Negative for cough and shortness of breath.    Cardiovascular:  Negative for chest pain, palpitations and leg swelling.   Neurological:  Negative for dizziness, weakness, numbness and headache.        Objective         Vitals:    12/13/23 0941   BP: 130/88   BP Location: Right arm   Patient Position: Sitting   Cuff Size: Adult   Pulse: 86   Resp: 16   Temp: 96.2 °F (35.7 °C)   TempSrc: Temporal   SpO2: 99%   Weight: 79.5 kg (175 lb 3.2 oz)   Height: 165.1 cm (65\")     Body mass index is 29.15 kg/m².         Physical Exam  Vitals reviewed.   Constitutional:       Appearance: Normal appearance. She is well-developed.   HENT:      Head: Normocephalic and atraumatic.      Mouth/Throat:      Pharynx: No oropharyngeal exudate.   Eyes:      Conjunctiva/sclera: Conjunctivae normal.      Pupils: Pupils are equal, round, and reactive to light.   Cardiovascular:      Rate and Rhythm: Normal rate and regular rhythm.      Heart sounds: Normal heart sounds. No murmur heard.     No friction rub. No gallop.   Pulmonary:      Effort: Pulmonary effort is normal.      Breath sounds: Normal breath sounds. No wheezing or rhonchi.   Skin:     General: Skin is warm and dry. "   Neurological:      Mental Status: She is alert and oriented to person, place, and time.   Psychiatric:         Mood and Affect: Mood and affect normal.         Behavior: Behavior normal.         Thought Content: Thought content normal.         Judgment: Judgment normal.             Result Review :               Assessment and Plan     Diagnoses and all orders for this visit:    1. Overweight with body mass index (BMI) of 27 to 27.9 in adult  -     phentermine 37.5 MG capsule; Take 1 capsule by mouth Every Morning.  Dispense: 30 capsule; Refill: 0              Follow Up     Return in about 1 month (around 1/13/2024).    Patient was given instructions and counseling regarding her condition or for health maintenance advice. Please see specific information pulled into the AVS if appropriate.

## 2024-01-12 ENCOUNTER — OFFICE VISIT (OUTPATIENT)
Dept: FAMILY MEDICINE CLINIC | Facility: CLINIC | Age: 49
End: 2024-01-12
Payer: COMMERCIAL

## 2024-01-12 VITALS
TEMPERATURE: 96.4 F | HEIGHT: 65 IN | RESPIRATION RATE: 16 BRPM | WEIGHT: 173.4 LBS | HEART RATE: 89 BPM | DIASTOLIC BLOOD PRESSURE: 82 MMHG | SYSTOLIC BLOOD PRESSURE: 130 MMHG | OXYGEN SATURATION: 98 % | BODY MASS INDEX: 28.89 KG/M2

## 2024-01-12 DIAGNOSIS — E66.3 OVERWEIGHT WITH BODY MASS INDEX (BMI) OF 28 TO 28.9 IN ADULT: Primary | ICD-10-CM

## 2024-01-12 PROCEDURE — 99213 OFFICE O/P EST LOW 20 MIN: CPT | Performed by: NURSE PRACTITIONER

## 2024-01-12 RX ORDER — PHENTERMINE HYDROCHLORIDE 37.5 MG/1
37.5 CAPSULE ORAL EVERY MORNING
Qty: 30 CAPSULE | Refills: 0 | Status: SHIPPED | OUTPATIENT
Start: 2024-01-12

## 2024-01-12 NOTE — PROGRESS NOTES
Chief Complaint  Obesity and Urine Leakage (Needs appeal letter for Myrbetric/)    Subjective        Angelika Shukla presents to [facility]  History of Present Illness  Obesity:  doing wel and has lost 2 pounds.  Denies shortness of breath or chest pain.  Blood pressure 130/82.    Urine leakage:  Has tried all other medications like ditropan, detrol and didn't help.  Myrbetriq helped but now not getting approved.  Obesity  Pertinent negatives include no chest pain, coughing, fever, numbness or weakness.       The following portions of the patient's history were personally reviewed and updated as appropriate: allergies, current medications, past medical history, past surgical history, past family history, and past social history.     Body mass index is 28.86 kg/m².           Past History:    Medical History: has a past medical history of Allergies, Anxiety (2014), Asthma, Bowel disease, Broken bones, Crohn disease, Diverticulitis, Diverticulosis (2010), Inflammatory bowel disease (2010), Irritable bowel syndrome (2010), Knee pain, Labral tear of shoulder, left, initial encounter (10/19/2015), Medial meniscus tear (05/17/2016), Migraines, Other abnormal glucose (ulcer), Patellar instability of right knee (07/29/2016), Peptic ulceration (2005), Shoulder impingement (11/19/2014), Shoulder pain, left (09/16/2015), Shoulder tendonitis (09/24/2014), and Stomach disorder.     Surgical History: has a past surgical history that includes Colonoscopy (2012); Hysterectomy; Knee arthroscopy, medial patello femoral ligament repair; Breast reconstruction (Bilateral); and Shoulder AC Joint Repair.     Family History: family history includes Breast cancer in her maternal grandmother; COPD in her mother; Heart disease in her maternal grandmother; Stroke in her father, maternal grandfather, and maternal grandmother.     Social History: reports that she has never smoked. She has never used smokeless  tobacco. She reports that she does not drink alcohol and does not use drugs.    Allergies: Hydrocodone, Cephalosporins, Erythromycin, Hydrocodone-acetaminophen, Semaglutide(0.25 or 0.5mg-dos), and Sulfa antibiotics          Current Outpatient Medications:     Bacillus Coagulans-Inulin (Probiotic) 1-250 BILLION-MG capsule, Probiotic 100 billion cell oral capsule take 1 capsule by oral route daily for 30 days   Suspended, Disp: , Rfl:     baclofen (LIORESAL) 10 MG tablet, Take 1 tablet by mouth 3 (Three) Times a Day., Disp: 40 tablet, Rfl: 1    busPIRone (BUSPAR) 30 MG tablet, TAKE 1 TABLET BY MOUTH TWICE A DAY AS DIRECTED, Disp: , Rfl:     butalbital-acetaminophen-caffeine (FIORICET, ESGIC) -40 MG per tablet, Take 1 tablet by mouth Every 6 (Six) Hours As Needed for Headache., Disp: 120 tablet, Rfl: 0    dicyclomine (BENTYL) 20 MG tablet, Take  by mouth., Disp: , Rfl:     eszopiclone (LUNESTA) 3 MG tablet, Take 1 tablet by mouth every night at bedtime., Disp: , Rfl:     hydrOXYzine (ATARAX) 50 MG tablet, TAKE ONE TABLET BY MOUTH THREE TIMES PER DAY AS NEEDED FOR ANXIETY, Disp: , Rfl:     Hyoscyamine Sulfate SL 0.125 MG sublingual tablet, Place 125 mcg under the tongue., Disp: , Rfl:     linaclotide (LINZESS) 290 MCG capsule capsule, Take 1 capsule by mouth Daily., Disp: , Rfl:     LORazepam (ATIVAN) 1 MG tablet, TAKE 1 TABLET BY MOUTH ONCE A DAY AS NEEDED FOR SEVERE ANXIETY, Disp: , Rfl:     lubiprostone (AMITIZA) 24 MCG capsule, Take 1 capsule by mouth 2 (Two) Times a Day With Meals., Disp: 60 capsule, Rfl: 0    Mirabegron ER (MYRBETRIQ) 50 MG tablet sustained-release 24 hour 24 hr tablet, Take 50 mg by mouth Daily., Disp: 90 tablet, Rfl: 1    nexIUM 40 MG capsule, Take 1 capsule by mouth Daily., Disp: 30 capsule, Rfl: 1    Nurtec 75 MG dispersible tablet, PLACE 1 TABLET ON TONGUE, ALLOW TO DISSOLVE THEN SWALLOW ONCE AS NEEDED FOR MIGRAINE; MAX 1 DOSE/24 HRS, Disp: 8 tablet, Rfl: 5    ondansetron ODT  "(ZOFRAN-ODT) 4 MG disintegrating tablet, TAKE 1 TABLET BY MOUTH EVERY 8 HOURS IF NEEDED FOR NAUSEA OR VOMITING FOR UP TO 7 DAYS., Disp: , Rfl:     PARoxetine (PAXIL) 30 MG tablet, TAKE TWO TABLETS EVERY NIGHT, Disp: , Rfl:     phentermine 37.5 MG capsule, Take 1 capsule by mouth Every Morning., Disp: 30 capsule, Rfl: 0    prazosin (MINIPRESS) 2 MG capsule, TAKE ONE CAPSULE BY MOUTH AT BEDTIME FOR NIGHTMARES, Disp: , Rfl:     propranolol (INDERAL) 10 MG tablet, Take 1 tablet by mouth 3 (Three) Times a Day As Needed. for anxiety, Disp: , Rfl:     topiramate (TOPAMAX) 50 MG tablet, Take 1 tablet by mouth 2 (Two) Times a Day., Disp: 180 tablet, Rfl: 1    traMADol (ULTRAM) 50 MG tablet, Take 1 tablet by mouth Every 6 (Six) Hours As Needed for Moderate Pain., Disp: 30 tablet, Rfl: 0    Medications Discontinued During This Encounter   Medication Reason    phentermine 37.5 MG capsule Reorder         Review of Systems   Constitutional:  Negative for fever.   Respiratory:  Negative for cough and shortness of breath.    Cardiovascular:  Negative for chest pain, palpitations and leg swelling.   Neurological:  Negative for dizziness, weakness, numbness and headache.        Objective         Vitals:    01/12/24 0952   BP: 130/82   BP Location: Right arm   Patient Position: Sitting   Cuff Size: Adult   Pulse: 89   Resp: 16   Temp: 96.4 °F (35.8 °C)   TempSrc: Temporal   SpO2: 98%   Weight: 78.7 kg (173 lb 6.4 oz)   Height: 165.1 cm (65\")     Body mass index is 28.86 kg/m².         Physical Exam  Vitals reviewed.   Constitutional:       Appearance: Normal appearance. She is well-developed.   HENT:      Head: Normocephalic and atraumatic.      Mouth/Throat:      Pharynx: No oropharyngeal exudate.   Eyes:      Conjunctiva/sclera: Conjunctivae normal.      Pupils: Pupils are equal, round, and reactive to light.   Cardiovascular:      Rate and Rhythm: Normal rate and regular rhythm.      Heart sounds: Normal heart sounds. No murmur " heard.     No friction rub. No gallop.   Pulmonary:      Effort: Pulmonary effort is normal.      Breath sounds: Normal breath sounds. No wheezing or rhonchi.   Skin:     General: Skin is warm and dry.   Neurological:      Mental Status: She is alert and oriented to person, place, and time.   Psychiatric:         Mood and Affect: Mood and affect normal.         Behavior: Behavior normal.         Thought Content: Thought content normal.         Judgment: Judgment normal.             Result Review :               Assessment and Plan     Diagnoses and all orders for this visit:    1. Overweight with body mass index (BMI) of 28 to 28.9 in adult (Primary)  -     phentermine 37.5 MG capsule; Take 1 capsule by mouth Every Morning.  Dispense: 30 capsule; Refill: 0          If not losing 4 pounds will have to change medication.  Discussed diet porgram through insurance why [facility].    Follow Up     Return in about 1 month (around 2/12/2024).    Patient was given instructions and counseling regarding her condition or for health maintenance advice. Please see specific information pulled into the AVS if appropriate.

## 2024-02-16 ENCOUNTER — LAB (OUTPATIENT)
Dept: LAB | Facility: HOSPITAL | Age: 49
End: 2024-02-16
Payer: COMMERCIAL

## 2024-02-16 ENCOUNTER — OFFICE VISIT (OUTPATIENT)
Dept: FAMILY MEDICINE CLINIC | Facility: CLINIC | Age: 49
End: 2024-02-16
Payer: COMMERCIAL

## 2024-02-16 VITALS
WEIGHT: 175.2 LBS | HEART RATE: 96 BPM | TEMPERATURE: 97.7 F | RESPIRATION RATE: 16 BRPM | SYSTOLIC BLOOD PRESSURE: 120 MMHG | HEIGHT: 65 IN | DIASTOLIC BLOOD PRESSURE: 72 MMHG | BODY MASS INDEX: 29.19 KG/M2 | OXYGEN SATURATION: 97 %

## 2024-02-16 DIAGNOSIS — E66.3 OVERWEIGHT WITH BODY MASS INDEX (BMI) OF 28 TO 28.9 IN ADULT: ICD-10-CM

## 2024-02-16 DIAGNOSIS — Z00.00 WELLNESS EXAMINATION: ICD-10-CM

## 2024-02-16 DIAGNOSIS — E66.3 OVERWEIGHT WITH BODY MASS INDEX (BMI) OF 28 TO 28.9 IN ADULT: Primary | ICD-10-CM

## 2024-02-16 LAB
ALBUMIN SERPL-MCNC: 5.1 G/DL (ref 3.5–5.2)
ALBUMIN/GLOB SERPL: 1.8 G/DL
ALP SERPL-CCNC: 75 U/L (ref 39–117)
ALT SERPL W P-5'-P-CCNC: 18 U/L (ref 1–33)
ANION GAP SERPL CALCULATED.3IONS-SCNC: 14.9 MMOL/L (ref 5–15)
AST SERPL-CCNC: 21 U/L (ref 1–32)
BACTERIA UR QL AUTO: ABNORMAL /HPF
BASOPHILS # BLD AUTO: 0.07 10*3/MM3 (ref 0–0.2)
BASOPHILS NFR BLD AUTO: 0.8 % (ref 0–1.5)
BILIRUB SERPL-MCNC: 0.6 MG/DL (ref 0–1.2)
BILIRUB UR QL STRIP: NEGATIVE
BUN SERPL-MCNC: 18 MG/DL (ref 6–20)
BUN/CREAT SERPL: 14.5 (ref 7–25)
CALCIUM SPEC-SCNC: 10 MG/DL (ref 8.6–10.5)
CHLORIDE SERPL-SCNC: 98 MMOL/L (ref 98–107)
CHOLEST SERPL-MCNC: 202 MG/DL (ref 0–200)
CLARITY UR: CLEAR
CO2 SERPL-SCNC: 24.1 MMOL/L (ref 22–29)
COLOR UR: ABNORMAL
CREAT SERPL-MCNC: 1.24 MG/DL (ref 0.57–1)
DEPRECATED RDW RBC AUTO: 42.4 FL (ref 37–54)
EGFRCR SERPLBLD CKD-EPI 2021: 53.1 ML/MIN/1.73
EOSINOPHIL # BLD AUTO: 0.19 10*3/MM3 (ref 0–0.4)
EOSINOPHIL NFR BLD AUTO: 2.1 % (ref 0.3–6.2)
ERYTHROCYTE [DISTWIDTH] IN BLOOD BY AUTOMATED COUNT: 13.4 % (ref 12.3–15.4)
GLOBULIN UR ELPH-MCNC: 2.9 GM/DL
GLUCOSE SERPL-MCNC: 106 MG/DL (ref 65–99)
GLUCOSE UR STRIP-MCNC: NEGATIVE MG/DL
HBA1C MFR BLD: 5.7 % (ref 4.8–5.6)
HCT VFR BLD AUTO: 44.7 % (ref 34–46.6)
HDLC SERPL QL: 2.89
HDLC SERPL-MCNC: 70 MG/DL (ref 40–60)
HGB BLD-MCNC: 15.1 G/DL (ref 12–15.9)
HGB UR QL STRIP.AUTO: NEGATIVE
HOLD SPECIMEN: NORMAL
HYALINE CASTS UR QL AUTO: ABNORMAL /LPF
IMM GRANULOCYTES # BLD AUTO: 0.03 10*3/MM3 (ref 0–0.05)
IMM GRANULOCYTES NFR BLD AUTO: 0.3 % (ref 0–0.5)
KETONES UR QL STRIP: NEGATIVE
LDLC SERPL CALC-MCNC: 117 MG/DL (ref 0–100)
LEUKOCYTE ESTERASE UR QL STRIP.AUTO: ABNORMAL
LYMPHOCYTES # BLD AUTO: 2.98 10*3/MM3 (ref 0.7–3.1)
LYMPHOCYTES NFR BLD AUTO: 32.5 % (ref 19.6–45.3)
MCH RBC QN AUTO: 29.7 PG (ref 26.6–33)
MCHC RBC AUTO-ENTMCNC: 33.8 G/DL (ref 31.5–35.7)
MCV RBC AUTO: 88 FL (ref 79–97)
MONOCYTES # BLD AUTO: 0.72 10*3/MM3 (ref 0.1–0.9)
MONOCYTES NFR BLD AUTO: 7.9 % (ref 5–12)
NEUTROPHILS NFR BLD AUTO: 5.18 10*3/MM3 (ref 1.7–7)
NEUTROPHILS NFR BLD AUTO: 56.4 % (ref 42.7–76)
NITRITE UR QL STRIP: POSITIVE
NRBC BLD AUTO-RTO: 0 /100 WBC (ref 0–0.2)
PH UR STRIP.AUTO: 5.5 [PH] (ref 5–8)
PLATELET # BLD AUTO: 449 10*3/MM3 (ref 140–450)
PMV BLD AUTO: 10.2 FL (ref 6–12)
POTASSIUM SERPL-SCNC: 3.3 MMOL/L (ref 3.5–5.2)
PROT SERPL-MCNC: 8 G/DL (ref 6–8.5)
PROT UR QL STRIP: NEGATIVE
RBC # BLD AUTO: 5.08 10*6/MM3 (ref 3.77–5.28)
RBC # UR STRIP: ABNORMAL /HPF
REF LAB TEST METHOD: ABNORMAL
SODIUM SERPL-SCNC: 137 MMOL/L (ref 136–145)
SP GR UR STRIP: 1.02 (ref 1–1.03)
SQUAMOUS #/AREA URNS HPF: ABNORMAL /HPF
TRIGL SERPL-MCNC: 84 MG/DL (ref 0–150)
TSH SERPL DL<=0.05 MIU/L-ACNC: 3.86 UIU/ML (ref 0.27–4.2)
UROBILINOGEN UR QL STRIP: ABNORMAL
VLDLC SERPL-MCNC: 15 MG/DL (ref 5–40)
WBC # UR STRIP: ABNORMAL /HPF
WBC NRBC COR # BLD AUTO: 9.17 10*3/MM3 (ref 3.4–10.8)

## 2024-02-16 PROCEDURE — 36415 COLL VENOUS BLD VENIPUNCTURE: CPT

## 2024-02-16 PROCEDURE — 80061 LIPID PANEL: CPT

## 2024-02-16 PROCEDURE — 83036 HEMOGLOBIN GLYCOSYLATED A1C: CPT

## 2024-02-16 PROCEDURE — 80050 GENERAL HEALTH PANEL: CPT

## 2024-02-16 PROCEDURE — 81001 URINALYSIS AUTO W/SCOPE: CPT

## 2024-02-16 PROCEDURE — 87086 URINE CULTURE/COLONY COUNT: CPT

## 2024-02-18 LAB — BACTERIA SPEC AEROBE CULT: NO GROWTH

## 2024-04-06 ENCOUNTER — PATIENT MESSAGE (OUTPATIENT)
Dept: FAMILY MEDICINE CLINIC | Facility: CLINIC | Age: 49
End: 2024-04-06
Payer: COMMERCIAL

## 2024-04-06 DIAGNOSIS — E66.3 OVERWEIGHT WITH BODY MASS INDEX (BMI) OF 28 TO 28.9 IN ADULT: Primary | ICD-10-CM

## 2024-04-08 RX ORDER — SEMAGLUTIDE 0.25 MG/.5ML
0.25 INJECTION, SOLUTION SUBCUTANEOUS WEEKLY
Qty: 2 ML | Refills: 0 | Status: SHIPPED | OUTPATIENT
Start: 2024-04-08 | End: 2024-04-12 | Stop reason: SDUPTHER

## 2024-04-08 NOTE — TELEPHONE ENCOUNTER
From: Angelika Shukla  To: Jesse Mukherjee  Sent: 4/6/2024 10:48 PM EDT  Subject: Medication    Jesse or christian   I tried to sign up for the healthy weight program through [facility] and the lady told me that I needed to have my medication denied first. So I guess we need to go ahead and send medication to pharmacy before I can get into this program. I did tell her about the previous weight lose medication I've been on and that I had baseline bloodwork. Thank you

## 2024-04-12 ENCOUNTER — TELEPHONE (OUTPATIENT)
Dept: FAMILY MEDICINE CLINIC | Facility: CLINIC | Age: 49
End: 2024-04-12
Payer: COMMERCIAL

## 2024-04-12 ENCOUNTER — PATIENT MESSAGE (OUTPATIENT)
Dept: FAMILY MEDICINE CLINIC | Facility: CLINIC | Age: 49
End: 2024-04-12
Payer: COMMERCIAL

## 2024-04-12 DIAGNOSIS — E66.3 OVERWEIGHT WITH BODY MASS INDEX (BMI) OF 28 TO 28.9 IN ADULT: Primary | ICD-10-CM

## 2024-04-12 DIAGNOSIS — E66.3 OVERWEIGHT WITH BODY MASS INDEX (BMI) OF 28 TO 28.9 IN ADULT: ICD-10-CM

## 2024-04-12 RX ORDER — SEMAGLUTIDE 0.25 MG/.5ML
0.25 INJECTION, SOLUTION SUBCUTANEOUS WEEKLY
Qty: 2 ML | Refills: 0 | Status: SHIPPED | OUTPATIENT
Start: 2024-04-12

## 2024-04-12 NOTE — TELEPHONE ENCOUNTER
----- Message from Angelika Shukla sent at 4/12/2024  5:23 AM EDT -----  Regarding: Medication  Contact: [phone]  [facility] is not showing any medication being sent in. So if you can just send to pharmacy in [place]  and let me know which one

## 2024-04-24 RX ORDER — SEMAGLUTIDE 0.5 MG/.5ML
0.5 INJECTION, SOLUTION SUBCUTANEOUS WEEKLY
Qty: 2 ML | Refills: 0 | Status: SHIPPED | OUTPATIENT
Start: 2024-04-24

## 2024-04-24 NOTE — TELEPHONE ENCOUNTER
From: Angelika Shukla  To: Jesse Mukherjee  Sent: 4/12/2024 5:23 AM EDT  Subject: Medication    [facility] is not showing any medication being sent in. So if you can just send to pharmacy in [place] and let me know which one

## 2024-04-26 ENCOUNTER — TELEPHONE (OUTPATIENT)
Dept: FAMILY MEDICINE CLINIC | Facility: CLINIC | Age: 49
End: 2024-04-26
Payer: COMMERCIAL

## 2024-04-26 NOTE — TELEPHONE ENCOUNTER
[facility] request for coverage of Wegovy 0.5MG/0.5ML SC SOAJ for  you. Your request was denied based on the terms of your prescription benefit plan. This  is the initial adverse coverage determination for this request. The reason for the denial  was:  *Drug Not Covered/Plan Exclusion - May Be Covered Under Medical- Your request for  coverage was denied because your prescription benefit plan does not cover the  requested medication; may be covered under medical plan.

## 2024-05-06 DIAGNOSIS — E66.3 OVERWEIGHT WITH BODY MASS INDEX (BMI) OF 28 TO 28.9 IN ADULT: ICD-10-CM

## 2024-05-06 RX ORDER — SEMAGLUTIDE 0.25 MG/.5ML
0.25 INJECTION, SOLUTION SUBCUTANEOUS WEEKLY
Qty: 2 ML | Refills: 0 | Status: SHIPPED | OUTPATIENT
Start: 2024-05-06

## 2024-05-06 RX ORDER — SEMAGLUTIDE 0.5 MG/.5ML
0.5 INJECTION, SOLUTION SUBCUTANEOUS WEEKLY
Qty: 2 ML | Refills: 0 | Status: SHIPPED | OUTPATIENT
Start: 2024-05-06

## 2024-05-17 ENCOUNTER — OFFICE VISIT (OUTPATIENT)
Dept: FAMILY MEDICINE CLINIC | Facility: CLINIC | Age: 49
End: 2024-05-17
Payer: COMMERCIAL

## 2024-05-17 VITALS
TEMPERATURE: 97.1 F | RESPIRATION RATE: 18 BRPM | HEART RATE: 60 BPM | SYSTOLIC BLOOD PRESSURE: 124 MMHG | BODY MASS INDEX: 31.09 KG/M2 | WEIGHT: 186.6 LBS | DIASTOLIC BLOOD PRESSURE: 76 MMHG | OXYGEN SATURATION: 98 % | HEIGHT: 65 IN

## 2024-05-17 DIAGNOSIS — E66.09 CLASS 1 OBESITY DUE TO EXCESS CALORIES WITH SERIOUS COMORBIDITY AND BODY MASS INDEX (BMI) OF 31.0 TO 31.9 IN ADULT: Primary | ICD-10-CM

## 2024-05-17 DIAGNOSIS — G43.009 MIGRAINE WITHOUT AURA AND WITHOUT STATUS MIGRAINOSUS, NOT INTRACTABLE: ICD-10-CM

## 2024-05-17 DIAGNOSIS — K57.92 DIVERTICULITIS: ICD-10-CM

## 2024-05-17 DIAGNOSIS — K58.1 IRRITABLE BOWEL SYNDROME WITH CONSTIPATION: ICD-10-CM

## 2024-05-17 PROCEDURE — 99213 OFFICE O/P EST LOW 20 MIN: CPT | Performed by: NURSE PRACTITIONER

## 2024-05-17 RX ORDER — SEMAGLUTIDE 1 MG/.5ML
1 INJECTION, SOLUTION SUBCUTANEOUS WEEKLY
Qty: 2 ML | Refills: 0 | Status: SHIPPED | OUTPATIENT
Start: 2024-05-17

## 2024-05-17 RX ORDER — LUBIPROSTONE 24 UG/1
24 CAPSULE ORAL 2 TIMES DAILY WITH MEALS
Qty: 180 CAPSULE | Refills: 1 | Status: SHIPPED | OUTPATIENT
Start: 2024-05-17

## 2024-05-17 NOTE — PROGRESS NOTES
Chief Complaint  Obesity and Headache    Subjective        Angelika Shukla presents to [facility]  History of Present Illness  Obesity:  has had wegovy sent in.    Headaches:  Fioricet works well.  Obesity  Associated symptoms include headaches. Pertinent negatives include no chest pain, coughing, fever, numbness or weakness.   Headache      The following portions of the patient's history were personally reviewed and updated as appropriate: allergies, current medications, past medical history, past surgical history, past family history, and past social history.     Body mass index is 31.05 kg/m².           Past History:    Medical History: has a past medical history of Allergies, Anxiety (2014), Asthma, Bowel disease, Broken bones, Crohn disease, Diverticulitis, Diverticulosis (2010), Inflammatory bowel disease (2010), Irritable bowel syndrome (2010), Knee pain, Labral tear of shoulder, left, initial encounter (10/19/2015), Medial meniscus tear (05/17/2016), Migraines, Other abnormal glucose (ulcer), Patellar instability of right knee (07/29/2016), Peptic ulceration (2005), Shoulder impingement (11/19/2014), Shoulder pain, left (09/16/2015), Shoulder tendonitis (09/24/2014), and Stomach disorder.     Surgical History: has a past surgical history that includes Colonoscopy (2012); Hysterectomy; Knee arthroscopy, medial patello femoral ligament repair; Breast reconstruction (Bilateral); and Shoulder AC Joint Repair.     Family History: family history includes Breast cancer in her maternal grandmother; COPD in her mother; Heart disease in her maternal grandmother; Stroke in her father, maternal grandfather, and maternal grandmother.     Social History: reports that she has never smoked. She has never used smokeless tobacco. She reports that she does not drink alcohol and does not use drugs.    Allergies: Hydrocodone, Cephalosporins, Erythromycin, Hydrocodone-acetaminophen,  Semaglutide(0.25 or 0.5mg-dos), and Sulfa antibiotics          Current Outpatient Medications:     Bacillus Coagulans-Inulin (Probiotic) 1-250 BILLION-MG capsule, Probiotic 100 billion cell oral capsule take 1 capsule by oral route daily for 30 days   Suspended, Disp: , Rfl:     busPIRone (BUSPAR) 30 MG tablet, TAKE 1 TABLET BY MOUTH TWICE A DAY AS DIRECTED, Disp: , Rfl:     butalbital-acetaminophen-caffeine (FIORICET, ESGIC) -40 MG per tablet, Take 1 tablet by mouth Every 6 (Six) Hours As Needed for Headache., Disp: 120 tablet, Rfl: 0    dicyclomine (BENTYL) 20 MG tablet, Take  by mouth., Disp: , Rfl:     eszopiclone (LUNESTA) 3 MG tablet, Take 1 tablet by mouth every night at bedtime., Disp: , Rfl:     hydrOXYzine (ATARAX) 50 MG tablet, TAKE ONE TABLET BY MOUTH THREE TIMES PER DAY AS NEEDED FOR ANXIETY, Disp: , Rfl:     Hyoscyamine Sulfate SL 0.125 MG sublingual tablet, Place 125 mcg under the tongue., Disp: , Rfl:     linaclotide (LINZESS) 290 MCG capsule capsule, Take 1 capsule by mouth Daily., Disp: , Rfl:     LORazepam (ATIVAN) 1 MG tablet, TAKE 1 TABLET BY MOUTH ONCE A DAY AS NEEDED FOR SEVERE ANXIETY, Disp: , Rfl:     lubiprostone (AMITIZA) 24 MCG capsule, Take 1 capsule by mouth 2 (Two) Times a Day With Meals., Disp: 180 capsule, Rfl: 1    nexIUM 40 MG capsule, Take 1 capsule by mouth Daily., Disp: 30 capsule, Rfl: 1    Nurtec 75 MG dispersible tablet, PLACE 1 TABLET ON TONGUE, ALLOW TO DISSOLVE THEN SWALLOW ONCE AS NEEDED FOR MIGRAINE; MAX 1 DOSE/24 HRS, Disp: 8 tablet, Rfl: 5    ondansetron ODT (ZOFRAN-ODT) 4 MG disintegrating tablet, TAKE 1 TABLET BY MOUTH EVERY 8 HOURS IF NEEDED FOR NAUSEA OR VOMITING FOR UP TO 7 DAYS., Disp: , Rfl:     PARoxetine (PAXIL) 30 MG tablet, TAKE TWO TABLETS EVERY NIGHT, Disp: , Rfl:     prazosin (MINIPRESS) 2 MG capsule, TAKE ONE CAPSULE BY MOUTH AT BEDTIME FOR NIGHTMARES, Disp: , Rfl:     propranolol (INDERAL) 10 MG tablet, Take 1 tablet by mouth 3 (Three) Times a Day  "As Needed. for anxiety, Disp: , Rfl:     topiramate (TOPAMAX) 50 MG tablet, Take 1 tablet by mouth 2 (Two) Times a Day., Disp: 180 tablet, Rfl: 1    traMADol (ULTRAM) 50 MG tablet, Take 1 tablet by mouth Every 6 (Six) Hours As Needed for Moderate Pain., Disp: 30 tablet, Rfl: 0    baclofen (LIORESAL) 10 MG tablet, Take 1 tablet by mouth 3 (Three) Times a Day. (Patient not taking: Reported on 5/17/2024), Disp: 40 tablet, Rfl: 1    Mirabegron ER (MYRBETRIQ) 50 MG tablet sustained-release 24 hour 24 hr tablet, Take 50 mg by mouth Daily. (Patient not taking: Reported on 5/17/2024), Disp: 90 tablet, Rfl: 1    Semaglutide-Weight Management (Wegovy) 1 MG/0.5ML solution auto-injector, Inject 0.5 mL under the skin into the appropriate area as directed 1 (One) Time Per Week., Disp: 2 mL, Rfl: 0    Medications Discontinued During This Encounter   Medication Reason    Semaglutide-Weight Management (Wegovy) 0.25 MG/0.5ML solution auto-injector *Therapy completed    Semaglutide-Weight Management (Wegovy) 0.5 MG/0.5ML solution auto-injector *Therapy completed    lubiprostone (AMITIZA) 24 MCG capsule Reorder         Review of Systems   Constitutional:  Negative for fever.   Respiratory:  Negative for cough and shortness of breath.    Cardiovascular:  Negative for chest pain, palpitations and leg swelling.   Neurological:  Negative for dizziness, weakness, numbness and headache.        Objective         Vitals:    05/17/24 1433   BP: 124/76   BP Location: Right arm   Patient Position: Sitting   Cuff Size: Adult   Pulse: 60   Resp: 18   Temp: 97.1 °F (36.2 °C)   TempSrc: Temporal   SpO2: 98%   Weight: 84.6 kg (186 lb 9.6 oz)   Height: 165.1 cm (65\")     Body mass index is 31.05 kg/m².         Physical Exam  Vitals reviewed.   Constitutional:       Appearance: Normal appearance. She is well-developed.   HENT:      Head: Normocephalic and atraumatic.      Mouth/Throat:      Pharynx: No oropharyngeal exudate.   Eyes:      " Conjunctiva/sclera: Conjunctivae normal.      Pupils: Pupils are equal, round, and reactive to light.   Cardiovascular:      Rate and Rhythm: Normal rate and regular rhythm.      Heart sounds: Normal heart sounds. No murmur heard.     No friction rub. No gallop.   Pulmonary:      Effort: Pulmonary effort is normal.      Breath sounds: Normal breath sounds. No wheezing or rhonchi.   Skin:     General: Skin is warm and dry.   Neurological:      Mental Status: She is alert and oriented to person, place, and time.   Psychiatric:         Mood and Affect: Mood and affect normal.         Behavior: Behavior normal.         Thought Content: Thought content normal.         Judgment: Judgment normal.             Result Review :               Assessment and Plan     Diagnoses and all orders for this visit:    1. Class 1 obesity due to excess calories with serious comorbidity and body mass index (BMI) of 31.0 to 31.9 in adult (Primary)  -     Semaglutide-Weight Management (Wegovy) 1 MG/0.5ML solution auto-injector; Inject 0.5 mL under the skin into the appropriate area as directed 1 (One) Time Per Week.  Dispense: 2 mL; Refill: 0    2. Migraine without aura and without status migrainosus, not intractable  Comments:  continue nurtec and will appeal to get for patient if able.    3. Irritable bowel syndrome with constipation  -     Semaglutide-Weight Management (Wegovy) 1 MG/0.5ML solution auto-injector; Inject 0.5 mL under the skin into the appropriate area as directed 1 (One) Time Per Week.  Dispense: 2 mL; Refill: 0    4. Diverticulitis  -     lubiprostone (AMITIZA) 24 MCG capsule; Take 1 capsule by mouth 2 (Two) Times a Day With Meals.  Dispense: 180 capsule; Refill: 1              Follow Up     Return in about 3 months (around 8/17/2024).    Patient was given instructions and counseling regarding her condition or for health maintenance advice. Please see specific information pulled into the AVS if appropriate.

## 2024-05-19 ENCOUNTER — PATIENT MESSAGE (OUTPATIENT)
Dept: FAMILY MEDICINE CLINIC | Facility: CLINIC | Age: 49
End: 2024-05-19
Payer: COMMERCIAL

## 2024-05-19 DIAGNOSIS — N32.81 OAB (OVERACTIVE BLADDER): Primary | ICD-10-CM

## 2024-05-20 NOTE — TELEPHONE ENCOUNTER
From: Angelika Shukla  To: Jesse Mukherjee  Sent: 5/19/2024 3:50 PM EDT  Subject: Medication issue    Jesse,  I got a call from [facility] in regards to the medication we sent in. They said the wegovy is not available in what you sent in nor did they think it would be available. They suggested another option. Saxenda or Olympic. Also, I talked with Jovita about the mybertic that still has not been approved, what do we need to do?

## 2024-05-24 DIAGNOSIS — G43.009 MIGRAINE WITHOUT AURA AND WITHOUT STATUS MIGRAINOSUS, NOT INTRACTABLE: ICD-10-CM

## 2024-05-28 RX ORDER — TOPIRAMATE 50 MG/1
TABLET, FILM COATED ORAL
Qty: 180 TABLET | Refills: 1 | Status: SHIPPED | OUTPATIENT
Start: 2024-05-28

## 2024-06-03 ENCOUNTER — TELEPHONE (OUTPATIENT)
Dept: FAMILY MEDICINE CLINIC | Facility: CLINIC | Age: 49
End: 2024-06-03
Payer: COMMERCIAL

## 2024-06-03 NOTE — TELEPHONE ENCOUNTER
[facility]    Patient is scheduled to visit with Jesse Mukherjee on 8/16/24. Provider will be out of the office during this time. Office would like to reschedule patient to later date.     Hub to share and relay

## 2024-06-24 DIAGNOSIS — K31.9 STOMACH DISORDER: ICD-10-CM

## 2024-08-14 ENCOUNTER — PATIENT MESSAGE (OUTPATIENT)
Dept: FAMILY MEDICINE CLINIC | Facility: CLINIC | Age: 49
End: 2024-08-14
Payer: COMMERCIAL

## 2024-08-14 NOTE — TELEPHONE ENCOUNTER
From: Angelika Shukla  To: Jesse Mukherjee  Sent: 8/14/2024 10:55 AM EDT  Subject: Shoulder injury    I've injured my shoulder. Dr jeffery seen me today. He is working on me but says that I need something for the pain. He said you can call him to verify. I've used tramodol, ibphorine, and meloicam with no relief. Please call me

## 2024-08-28 DIAGNOSIS — K31.9 STOMACH DISORDER: ICD-10-CM

## 2024-08-28 RX ORDER — ESOMEPRAZOLE MAGNESIUM 40 MG
40 CAPSULE,DELAYED RELEASE (ENTERIC COATED) ORAL DAILY
Qty: 30 CAPSULE | Refills: 1 | Status: SHIPPED | OUTPATIENT
Start: 2024-08-28

## 2024-09-12 ENCOUNTER — TELEPHONE (OUTPATIENT)
Dept: FAMILY MEDICINE CLINIC | Facility: CLINIC | Age: 49
End: 2024-09-12
Payer: COMMERCIAL

## 2024-09-13 ENCOUNTER — OFFICE VISIT (OUTPATIENT)
Dept: FAMILY MEDICINE CLINIC | Facility: CLINIC | Age: 49
End: 2024-09-13
Payer: COMMERCIAL

## 2024-09-13 VITALS
HEIGHT: 65 IN | TEMPERATURE: 97.9 F | HEART RATE: 68 BPM | WEIGHT: 179.4 LBS | OXYGEN SATURATION: 96 % | SYSTOLIC BLOOD PRESSURE: 130 MMHG | DIASTOLIC BLOOD PRESSURE: 80 MMHG | BODY MASS INDEX: 29.89 KG/M2

## 2024-09-13 DIAGNOSIS — Z51.81 MEDICATION MONITORING ENCOUNTER: ICD-10-CM

## 2024-09-13 DIAGNOSIS — G89.29 CHRONIC LOW BACK PAIN WITH SCIATICA, SCIATICA LATERALITY UNSPECIFIED, UNSPECIFIED BACK PAIN LATERALITY: ICD-10-CM

## 2024-09-13 DIAGNOSIS — M54.40 CHRONIC LOW BACK PAIN WITH SCIATICA, SCIATICA LATERALITY UNSPECIFIED, UNSPECIFIED BACK PAIN LATERALITY: ICD-10-CM

## 2024-09-13 DIAGNOSIS — N81.10 CYSTOCELE, UNSPECIFIED: Primary | ICD-10-CM

## 2024-09-13 DIAGNOSIS — K31.9 STOMACH DISORDER: ICD-10-CM

## 2024-09-13 DIAGNOSIS — G43.009 MIGRAINE WITHOUT AURA AND WITHOUT STATUS MIGRAINOSUS, NOT INTRACTABLE: ICD-10-CM

## 2024-09-13 LAB
AMPHET+METHAMPHET UR QL: NEGATIVE
AMPHETAMINE INTERNAL CONTROL: NORMAL
AMPHETAMINES UR QL: NEGATIVE
BARBITURATE INTERNAL CONTROL: NORMAL
BARBITURATES UR QL SCN: NEGATIVE
BENZODIAZ UR QL SCN: NEGATIVE
BENZODIAZEPINE INTERNAL CONTROL: NORMAL
BUPRENORPHINE INTERNAL CONTROL: NORMAL
BUPRENORPHINE SERPL-MCNC: NEGATIVE NG/ML
CANNABINOIDS SERPL QL: NEGATIVE
COCAINE INTERNAL CONTROL: NORMAL
COCAINE UR QL: NEGATIVE
EXPIRATION DATE: NORMAL
Lab: NORMAL
MDMA (ECSTASY) INTERNAL CONTROL: NORMAL
MDMA UR QL SCN: NEGATIVE
METHADONE INTERNAL CONTROL: NORMAL
METHADONE UR QL SCN: NEGATIVE
METHAMPHETAMINE INTERNAL CONTROL: NORMAL
MORPHINE INTERNAL CONTROL: NORMAL
MORPHINE/OPIATES SCREEN, URINE: NEGATIVE
OXYCODONE INTERNAL CONTROL: NORMAL
OXYCODONE UR QL SCN: NEGATIVE
PCP UR QL SCN: NEGATIVE
PHENCYCLIDINE INTERNAL CONTROL: NORMAL
THC INTERNAL CONTROL: NORMAL

## 2024-09-13 PROCEDURE — 99214 OFFICE O/P EST MOD 30 MIN: CPT | Performed by: NURSE PRACTITIONER

## 2024-09-13 RX ORDER — BUTALBITAL, ACETAMINOPHEN AND CAFFEINE 50; 325; 40 MG/1; MG/1; MG/1
1 TABLET ORAL EVERY 6 HOURS PRN
Qty: 120 TABLET | Refills: 0 | Status: SHIPPED | OUTPATIENT
Start: 2024-09-13

## 2024-09-13 RX ORDER — ESOMEPRAZOLE MAGNESIUM 40 MG
40 CAPSULE,DELAYED RELEASE (ENTERIC COATED) ORAL DAILY
Qty: 30 CAPSULE | Refills: 1 | Status: SHIPPED | OUTPATIENT
Start: 2024-09-13

## 2024-09-13 RX ORDER — ACETAMINOPHEN AND CODEINE PHOSPHATE 300; 30 MG/1; MG/1
1 TABLET ORAL EVERY 4 HOURS PRN
Qty: 12 TABLET | Refills: 0 | Status: SHIPPED | OUTPATIENT
Start: 2024-09-13

## 2024-09-13 RX ORDER — SEMAGLUTIDE 0.68 MG/ML
INJECTION, SOLUTION SUBCUTANEOUS
COMMUNITY
Start: 2024-07-08

## 2024-09-13 RX ORDER — SEMAGLUTIDE 1.34 MG/ML
INJECTION, SOLUTION SUBCUTANEOUS
COMMUNITY
Start: 2024-08-30

## 2024-09-13 NOTE — PROGRESS NOTES
Chief Complaint  Back Pain and med refills     Subjective        Angelika Shukla presents to [facility]  History of Present Illness  Back pain chronic and at times she has been in pain and can take tylenol #3.  Is going to chiropractor  Back Pain  Pertinent negatives include no chest pain, fever, numbness or weakness.       The following portions of the patient's history were personally reviewed and updated as appropriate: allergies, current medications, past medical history, past surgical history, past family history, and past social history.     Body mass index is 29.85 kg/m².           Past History:    Medical History: has a past medical history of Allergies, Anxiety (2014), Asthma, Bowel disease, Broken bones, Crohn disease, Diverticulitis, Diverticulosis (2010), Inflammatory bowel disease (2010), Irritable bowel syndrome (2010), Knee pain, Labral tear of shoulder, left, initial encounter (10/19/2015), Medial meniscus tear (05/17/2016), Migraines, Other abnormal glucose (ulcer), Patellar instability of right knee (07/29/2016), Peptic ulceration (2005), Shoulder impingement (11/19/2014), Shoulder pain, left (09/16/2015), Shoulder tendonitis (09/24/2014), and Stomach disorder.     Surgical History: has a past surgical history that includes Colonoscopy (2012); Hysterectomy; Knee arthroscopy, medial patello femoral ligament repair; Breast reconstruction (Bilateral); and Shoulder AC Joint Repair.     Family History: family history includes Breast cancer in her maternal grandmother; COPD in her mother; Heart disease in her maternal grandmother; Stroke in her father, maternal grandfather, and maternal grandmother.     Social History: reports that she has never smoked. She has never used smokeless tobacco. She reports that she does not drink alcohol and does not use drugs.    Allergies: Hydrocodone, Cephalosporins, Erythromycin, Hydrocodone-acetaminophen, Semaglutide(0.25 or  0.5mg-dos), and Sulfa antibiotics          Current Outpatient Medications:     busPIRone (BUSPAR) 30 MG tablet, TAKE 1 TABLET BY MOUTH TWICE A DAY AS DIRECTED, Disp: , Rfl:     butalbital-acetaminophen-caffeine (FIORICET, ESGIC) -40 MG per tablet, Take 1 tablet by mouth Every 6 (Six) Hours As Needed for Headache., Disp: 120 tablet, Rfl: 0    dicyclomine (BENTYL) 20 MG tablet, Take  by mouth., Disp: , Rfl:     eszopiclone (LUNESTA) 3 MG tablet, Take 1 tablet by mouth every night at bedtime., Disp: , Rfl:     hydrOXYzine (ATARAX) 50 MG tablet, TAKE ONE TABLET BY MOUTH THREE TIMES PER DAY AS NEEDED FOR ANXIETY, Disp: , Rfl:     Hyoscyamine Sulfate SL 0.125 MG sublingual tablet, Place 125 mcg under the tongue., Disp: , Rfl:     linaclotide (LINZESS) 290 MCG capsule capsule, Take 1 capsule by mouth Daily., Disp: , Rfl:     LORazepam (ATIVAN) 1 MG tablet, TAKE 1 TABLET BY MOUTH ONCE A DAY AS NEEDED FOR SEVERE ANXIETY, Disp: , Rfl:     lubiprostone (AMITIZA) 24 MCG capsule, Take 1 capsule by mouth 2 (Two) Times a Day With Meals., Disp: 180 capsule, Rfl: 1    nexIUM 40 MG capsule, TAKE 1 CAPSULE BY MOUTH ONCE DAILY, Disp: 30 capsule, Rfl: 1    ondansetron ODT (ZOFRAN-ODT) 4 MG disintegrating tablet, TAKE 1 TABLET BY MOUTH EVERY 8 HOURS IF NEEDED FOR NAUSEA OR VOMITING FOR UP TO 7 DAYS., Disp: , Rfl:     Ozempic, 0.25 or 0.5 MG/DOSE, 2 MG/3ML solution pen-injector, , Disp: , Rfl:     Ozempic, 1 MG/DOSE, 4 MG/3ML solution pen-injector, , Disp: , Rfl:     PARoxetine (PAXIL) 30 MG tablet, TAKE TWO TABLETS EVERY NIGHT, Disp: , Rfl:     prazosin (MINIPRESS) 2 MG capsule, TAKE ONE CAPSULE BY MOUTH AT BEDTIME FOR NIGHTMARES, Disp: , Rfl:     propranolol (INDERAL) 10 MG tablet, Take 1 tablet by mouth 3 (Three) Times a Day As Needed. for anxiety, Disp: , Rfl:     topiramate (TOPAMAX) 50 MG tablet, TAKE ONE TABLET BY MOUTH TWO TIMES PER DAY, Disp: 180 tablet, Rfl: 1    traMADol (ULTRAM) 50 MG tablet, Take 1 tablet by mouth Every  "6 (Six) Hours As Needed for Moderate Pain., Disp: 30 tablet, Rfl: 0    Bacillus Coagulans-Inulin (Probiotic) 1-250 BILLION-MG capsule, Probiotic 100 billion cell oral capsule take 1 capsule by oral route daily for 30 days   Suspended (Patient not taking: Reported on 9/13/2024), Disp: , Rfl:     Mirabegron ER (MYRBETRIQ) 50 MG tablet sustained-release 24 hour 24 hr tablet, Take 50 mg by mouth Daily. (Patient not taking: Reported on 5/17/2024), Disp: 90 tablet, Rfl: 1    Nurtec 75 MG dispersible tablet, PLACE 1 TABLET ON TONGUE, ALLOW TO DISSOLVE THEN SWALLOW ONCE AS NEEDED FOR MIGRAINE; MAX 1 DOSE/24 HRS (Patient not taking: Reported on 9/13/2024), Disp: 8 tablet, Rfl: 5    Medications Discontinued During This Encounter   Medication Reason    Semaglutide-Weight Management (Wegovy) 1 MG/0.5ML solution auto-injector *Therapy completed    baclofen (LIORESAL) 10 MG tablet *Therapy completed         Review of Systems   Constitutional:  Negative for fever.   Respiratory:  Negative for cough and shortness of breath.    Cardiovascular:  Negative for chest pain, palpitations and leg swelling.   Musculoskeletal:  Positive for back pain.   Neurological:  Negative for dizziness, weakness, numbness and headache.        Objective         Vitals:    09/13/24 1002   BP: 130/80   BP Location: Left arm   Patient Position: Sitting   Cuff Size: Adult   Pulse: 68   Temp: 97.9 °F (36.6 °C)   SpO2: 96%   Weight: 81.4 kg (179 lb 6.4 oz)   Height: 165.1 cm (65\")     Body mass index is 29.85 kg/m².         Physical Exam  Vitals reviewed.   Constitutional:       Appearance: Normal appearance. She is well-developed.   HENT:      Head: Normocephalic and atraumatic.      Mouth/Throat:      Pharynx: No oropharyngeal exudate.   Eyes:      Conjunctiva/sclera: Conjunctivae normal.      Pupils: Pupils are equal, round, and reactive to light.   Cardiovascular:      Rate and Rhythm: Normal rate and regular rhythm.      Heart sounds: Normal heart sounds. " No murmur heard.     No friction rub. No gallop.   Pulmonary:      Effort: Pulmonary effort is normal.      Breath sounds: Normal breath sounds. No wheezing or rhonchi.   Musculoskeletal:        Arms:       Comments: Tenderness to palpation.   Skin:     General: Skin is warm and dry.   Neurological:      Mental Status: She is alert and oriented to person, place, and time.   Psychiatric:         Mood and Affect: Mood and affect normal.         Behavior: Behavior normal.         Thought Content: Thought content normal.         Judgment: Judgment normal.             Result Review :               Assessment and Plan     Diagnoses and all orders for this visit:    1. Cystocele, unspecified (Primary)    2. Migraine without aura and without status migrainosus, not intractable  Comments:  continue nurtec and will appeal to get for patient if able.    3. Stomach disorder    4. Chronic low back pain with sciatica, sciatica laterality unspecified, unspecified back pain laterality              Follow Up     Return in about 6 months (around 3/13/2025).    Patient was given instructions and counseling regarding her condition or for health maintenance advice. Please see specific information pulled into the AVS if appropriate.

## 2024-10-29 ENCOUNTER — PATIENT MESSAGE (OUTPATIENT)
Dept: FAMILY MEDICINE CLINIC | Facility: CLINIC | Age: 49
End: 2024-10-29
Payer: COMMERCIAL

## 2024-10-29 DIAGNOSIS — M25.511 ACUTE PAIN OF RIGHT SHOULDER: Primary | ICD-10-CM

## 2024-11-04 ENCOUNTER — PATIENT MESSAGE (OUTPATIENT)
Dept: FAMILY MEDICINE CLINIC | Facility: CLINIC | Age: 49
End: 2024-11-04
Payer: COMMERCIAL

## 2024-11-05 DIAGNOSIS — M25.511 ACUTE PAIN OF RIGHT SHOULDER: ICD-10-CM

## 2025-01-06 DIAGNOSIS — K31.9 STOMACH DISORDER: ICD-10-CM

## 2025-01-06 RX ORDER — ESOMEPRAZOLE MAGNESIUM 40 MG/1
40 CAPSULE, DELAYED RELEASE ORAL DAILY
Qty: 30 CAPSULE | Refills: 1 | Status: SHIPPED | OUTPATIENT
Start: 2025-01-06

## 2025-01-06 RX ORDER — ESOMEPRAZOLE MAGNESIUM 40 MG/1
40 CAPSULE, DELAYED RELEASE ORAL DAILY
Qty: 30 CAPSULE | Refills: 1 | Status: SHIPPED | OUTPATIENT
Start: 2025-01-06 | End: 2025-01-06 | Stop reason: SDUPTHER

## 2025-03-14 ENCOUNTER — RESULTS FOLLOW-UP (OUTPATIENT)
Dept: FAMILY MEDICINE CLINIC | Facility: CLINIC | Age: 50
End: 2025-03-14

## 2025-03-14 ENCOUNTER — OFFICE VISIT (OUTPATIENT)
Dept: FAMILY MEDICINE CLINIC | Facility: CLINIC | Age: 50
End: 2025-03-14
Payer: COMMERCIAL

## 2025-03-14 VITALS
RESPIRATION RATE: 14 BRPM | BODY MASS INDEX: 28.32 KG/M2 | HEART RATE: 66 BPM | HEIGHT: 65 IN | DIASTOLIC BLOOD PRESSURE: 88 MMHG | TEMPERATURE: 96.6 F | SYSTOLIC BLOOD PRESSURE: 118 MMHG | WEIGHT: 170 LBS

## 2025-03-14 DIAGNOSIS — K57.92 DIVERTICULITIS: ICD-10-CM

## 2025-03-14 DIAGNOSIS — M54.50 ACUTE LOW BACK PAIN WITHOUT SCIATICA, UNSPECIFIED BACK PAIN LATERALITY: ICD-10-CM

## 2025-03-14 DIAGNOSIS — Z00.00 ANNUAL PHYSICAL EXAM: Primary | ICD-10-CM

## 2025-03-14 DIAGNOSIS — M62.838 MUSCLE SPASM: ICD-10-CM

## 2025-03-14 DIAGNOSIS — N32.81 OAB (OVERACTIVE BLADDER): ICD-10-CM

## 2025-03-14 DIAGNOSIS — M54.40 CHRONIC LOW BACK PAIN WITH SCIATICA, SCIATICA LATERALITY UNSPECIFIED, UNSPECIFIED BACK PAIN LATERALITY: ICD-10-CM

## 2025-03-14 DIAGNOSIS — K31.9 STOMACH DISORDER: ICD-10-CM

## 2025-03-14 DIAGNOSIS — G89.29 CHRONIC LOW BACK PAIN WITH SCIATICA, SCIATICA LATERALITY UNSPECIFIED, UNSPECIFIED BACK PAIN LATERALITY: ICD-10-CM

## 2025-03-14 DIAGNOSIS — G43.009 MIGRAINE WITHOUT AURA AND WITHOUT STATUS MIGRAINOSUS, NOT INTRACTABLE: ICD-10-CM

## 2025-03-14 RX ORDER — BUTALBITAL, ACETAMINOPHEN AND CAFFEINE 50; 325; 40 MG/1; MG/1; MG/1
1 TABLET ORAL EVERY 6 HOURS PRN
Qty: 120 TABLET | Refills: 0 | Status: SHIPPED | OUTPATIENT
Start: 2025-03-14

## 2025-03-14 RX ORDER — LUBIPROSTONE 24 UG/1
24 CAPSULE ORAL 2 TIMES DAILY WITH MEALS
Qty: 180 CAPSULE | Refills: 1 | Status: CANCELLED | OUTPATIENT
Start: 2025-03-14

## 2025-03-14 RX ORDER — SEMAGLUTIDE 2.68 MG/ML
INJECTION, SOLUTION SUBCUTANEOUS
COMMUNITY
Start: 2025-02-10

## 2025-03-14 RX ORDER — ESOMEPRAZOLE MAGNESIUM 40 MG/1
40 CAPSULE, DELAYED RELEASE ORAL DAILY
Qty: 30 CAPSULE | Refills: 5 | Status: SHIPPED | OUTPATIENT
Start: 2025-03-14

## 2025-03-14 RX ORDER — ACETAMINOPHEN AND CODEINE PHOSPHATE 300; 30 MG/1; MG/1
1 TABLET ORAL EVERY 4 HOURS PRN
Qty: 12 TABLET | Refills: 0 | Status: CANCELLED | OUTPATIENT
Start: 2025-03-14

## 2025-03-14 RX ORDER — TRAMADOL HYDROCHLORIDE 50 MG/1
50 TABLET ORAL EVERY 6 HOURS PRN
Qty: 30 TABLET | Refills: 0 | Status: SHIPPED | OUTPATIENT
Start: 2025-03-14

## 2025-03-14 RX ORDER — METHOCARBAMOL 750 MG/1
750 TABLET, FILM COATED ORAL 4 TIMES DAILY PRN
Qty: 90 TABLET | Refills: 1 | Status: SHIPPED | OUTPATIENT
Start: 2025-03-14

## 2025-03-14 RX ORDER — LORAZEPAM 1 MG/1
TABLET ORAL
Status: CANCELLED | OUTPATIENT
Start: 2025-03-14

## 2025-03-14 NOTE — PROGRESS NOTES
Chief Complaint  Med Refill (Follow up on medicine in general )    Subjective        Angelika Shukla presents to [facility]  History of Present Illness  Other provider has patient on Ozempic because Wegovy wasn't approved.     Headaches:  Fioricet works well.  Had weaned off topamax and did okay but having to use migraine meds more.  Obesity  Associated symptoms include headaches. Pertinent negatives include no chest pain, coughing, fever, numbness or weakness.   Headache      The following portions of the patient's history were personally reviewed and updated as appropriate: allergies, current medications, past medical history, past surgical history, past family history, and past social history.     Body mass index is 28.29 kg/m².           Past History:    Medical History: has a past medical history of Allergies, Anxiety (2014), Asthma, Bowel disease, Broken bones, Crohn disease, Diverticulitis, Diverticulosis (2010), Inflammatory bowel disease (2010), Irritable bowel syndrome (2010), Knee pain, Labral tear of shoulder, left, initial encounter (10/19/2015), Medial meniscus tear (05/17/2016), Migraines, Other abnormal glucose (ulcer), Patellar instability of right knee (07/29/2016), Peptic ulceration (2005), Shoulder impingement (11/19/2014), Shoulder pain, left (09/16/2015), Shoulder tendonitis (09/24/2014), and Stomach disorder.     Surgical History: has a past surgical history that includes Colonoscopy (2012); Hysterectomy; Knee arthroscopy, medial patello femoral ligament repair; Breast reconstruction (Bilateral); and Shoulder AC Joint Repair.     Family History: family history includes Breast cancer in her maternal grandmother; COPD in her mother; Heart disease in her maternal grandmother; Stroke in her father, maternal grandfather, and maternal grandmother.     Social History: reports that she has never smoked. She has never used smokeless tobacco. She reports that she does  not drink alcohol and does not use drugs.    Allergies: Hydrocodone, Cephalosporins, Erythromycin, Hydrocodone-acetaminophen, Semaglutide(0.25 or 0.5mg-dos), and Sulfa antibiotics          Current Outpatient Medications:     busPIRone (BUSPAR) 30 MG tablet, TAKE 1 TABLET BY MOUTH TWICE A DAY AS DIRECTED, Disp: , Rfl:     butalbital-acetaminophen-caffeine (FIORICET, ESGIC) -40 MG per tablet, Take 1 tablet by mouth Every 6 (Six) Hours As Needed for Headache., Disp: 120 tablet, Rfl: 0    eszopiclone (LUNESTA) 3 MG tablet, Take 1 tablet by mouth every night at bedtime., Disp: , Rfl:     hydrOXYzine (ATARAX) 50 MG tablet, TAKE ONE TABLET BY MOUTH THREE TIMES PER DAY AS NEEDED FOR ANXIETY, Disp: , Rfl:     Hyoscyamine Sulfate SL 0.125 MG sublingual tablet, Place 125 mcg under the tongue., Disp: , Rfl:     linaclotide (LINZESS) 290 MCG capsule capsule, Take 1 capsule by mouth Daily., Disp: , Rfl:     LORazepam (ATIVAN) 1 MG tablet, TAKE 1 TABLET BY MOUTH ONCE A DAY AS NEEDED FOR SEVERE ANXIETY, Disp: , Rfl:     nexIUM 40 MG capsule, Take 1 capsule by mouth Daily., Disp: 30 capsule, Rfl: 5    ondansetron ODT (ZOFRAN-ODT) 4 MG disintegrating tablet, TAKE 1 TABLET BY MOUTH EVERY 8 HOURS IF NEEDED FOR NAUSEA OR VOMITING FOR UP TO 7 DAYS., Disp: , Rfl:     Ozempic, 2 MG/DOSE, 8 MG/3ML solution pen-injector, INJECT 2MG SUBCUTANEOUSLY  ONCE WEEKLY (EVERY 7 DAYS), Disp: , Rfl:     PARoxetine (PAXIL) 30 MG tablet, TAKE TWO TABLETS EVERY NIGHT, Disp: , Rfl:     prazosin (MINIPRESS) 2 MG capsule, TAKE ONE CAPSULE BY MOUTH AT BEDTIME FOR NIGHTMARES, Disp: , Rfl:     propranolol (INDERAL) 10 MG tablet, Take 1 tablet by mouth 3 (Three) Times a Day As Needed. for anxiety, Disp: , Rfl:     topiramate (TOPAMAX) 50 MG tablet, TAKE ONE TABLET BY MOUTH TWO TIMES PER DAY, Disp: 180 tablet, Rfl: 1    traMADol (ULTRAM) 50 MG tablet, Take 1 tablet by mouth Every 6 (Six) Hours As Needed for Moderate Pain., Disp: 30 tablet, Rfl: 0     "acetaminophen-codeine (TYLENOL with CODEINE #3) 300-30 MG per tablet, Take 1 tablet by mouth Every 4 (Four) Hours As Needed for Moderate Pain or Severe Pain. (Patient not taking: Reported on 3/14/2025), Disp: 12 tablet, Rfl: 0    Bacillus Coagulans-Inulin (Probiotic) 1-250 BILLION-MG capsule, Probiotic 100 billion cell oral capsule take 1 capsule by oral route daily for 30 days   Suspended (Patient not taking: Reported on 9/13/2024), Disp: , Rfl:     methocarbamol (ROBAXIN) 750 MG tablet, Take 1 tablet by mouth 4 (Four) Times a Day As Needed for Muscle Spasms., Disp: 90 tablet, Rfl: 1    Mirabegron ER (MYRBETRIQ) 50 MG tablet sustained-release 24 hour 24 hr tablet, Take 50 mg by mouth Daily. (Patient not taking: Reported on 5/17/2024), Disp: 90 tablet, Rfl: 1    Nurtec 75 MG dispersible tablet, PLACE 1 TABLET ON TONGUE, ALLOW TO DISSOLVE THEN SWALLOW ONCE AS NEEDED FOR MIGRAINE; MAX 1 DOSE/24 HRS (Patient not taking: Reported on 9/13/2024), Disp: 8 tablet, Rfl: 5    Medications Discontinued During This Encounter   Medication Reason    Ozempic, 0.25 or 0.5 MG/DOSE, 2 MG/3ML solution pen-injector Dose adjustment    Ozempic, 1 MG/DOSE, 4 MG/3ML solution pen-injector Dose adjustment    lubiprostone (AMITIZA) 24 MCG capsule *Therapy completed    traMADol (ULTRAM) 50 MG tablet Reorder    butalbital-acetaminophen-caffeine (FIORICET, ESGIC) -40 MG per tablet Reorder    nexIUM 40 MG capsule Reorder    dicyclomine (BENTYL) 20 MG tablet *Therapy completed         Review of Systems   Constitutional:  Negative for fever.   Respiratory:  Negative for cough.    Cardiovascular:  Negative for chest pain.   Neurological:  Negative for weakness and numbness.        Objective         Vitals:    03/14/25 1015   BP: 118/88   BP Location: Right arm   Patient Position: Sitting   Cuff Size: Adult   Pulse: 66   Resp: 14   Temp: 96.6 °F (35.9 °C)   TempSrc: Temporal   Weight: 77.1 kg (170 lb)   Height: 165.1 cm (65\")     Body mass index " is 28.29 kg/m².         Physical Exam  Vitals reviewed.   Constitutional:       Appearance: Normal appearance. She is well-developed.   HENT:      Head: Normocephalic and atraumatic.      Mouth/Throat:      Pharynx: No oropharyngeal exudate.   Eyes:      Conjunctiva/sclera: Conjunctivae normal.      Pupils: Pupils are equal, round, and reactive to light.   Cardiovascular:      Rate and Rhythm: Normal rate and regular rhythm.      Heart sounds: Normal heart sounds. No murmur heard.     No friction rub. No gallop.   Pulmonary:      Effort: Pulmonary effort is normal.      Breath sounds: Normal breath sounds. No wheezing or rhonchi.   Abdominal:      General: Bowel sounds are normal.      Palpations: Abdomen is soft.      Tenderness: There is no abdominal tenderness. There is no right CVA tenderness or left CVA tenderness.   Skin:     General: Skin is warm and dry.   Neurological:      Mental Status: She is alert and oriented to person, place, and time.   Psychiatric:         Mood and Affect: Mood and affect normal.         Behavior: Behavior normal.         Thought Content: Thought content normal.         Judgment: Judgment normal.             Result Review :               Assessment and Plan     Diagnoses and all orders for this visit:    1. Annual physical exam (Primary)  Comments:  discussed diet and exercise.    2. Chronic low back pain with sciatica, sciatica laterality unspecified, unspecified back pain laterality    3. Migraine without aura and without status migrainosus, not intractable  Comments:  continue nurtec and will appeal to get for patient if able.  Orders:  -     butalbital-acetaminophen-caffeine (FIORICET, ESGIC) -40 MG per tablet; Take 1 tablet by mouth Every 6 (Six) Hours As Needed for Headache.  Dispense: 120 tablet; Refill: 0    4. Diverticulitis    5. OAB (overactive bladder)    6. Stomach disorder  -     nexIUM 40 MG capsule; Take 1 capsule by mouth Daily.  Dispense: 30 capsule; Refill:  5    7. Muscle spasm  -     methocarbamol (ROBAXIN) 750 MG tablet; Take 1 tablet by mouth 4 (Four) Times a Day As Needed for Muscle Spasms.  Dispense: 90 tablet; Refill: 1    8. Acute low back pain without sciatica, unspecified back pain laterality  -     traMADol (ULTRAM) 50 MG tablet; Take 1 tablet by mouth Every 6 (Six) Hours As Needed for Moderate Pain.  Dispense: 30 tablet; Refill: 0              Follow Up     Return in about 6 months (around 9/14/2025).    Patient was given instructions and counseling regarding her condition or for health maintenance advice. Please see specific information pulled into the AVS if appropriate.

## 2025-03-19 NOTE — TELEPHONE ENCOUNTER
Shannon called back, relayed message on chart. Patient verbalized understanding and has no questions at this time. Thanks.

## 2025-05-06 ENCOUNTER — OFFICE VISIT (OUTPATIENT)
Dept: ORTHOPEDIC SURGERY | Facility: CLINIC | Age: 50
End: 2025-05-06
Payer: COMMERCIAL

## 2025-05-06 VITALS — BODY MASS INDEX: 28.49 KG/M2 | WEIGHT: 171 LBS | HEIGHT: 65 IN

## 2025-05-06 DIAGNOSIS — M25.511 RIGHT SHOULDER PAIN, UNSPECIFIED CHRONICITY: Primary | ICD-10-CM

## 2025-05-06 DIAGNOSIS — M75.111 INCOMPLETE TEAR OF RIGHT ROTATOR CUFF, UNSPECIFIED WHETHER TRAUMATIC: ICD-10-CM

## 2025-05-06 RX ORDER — TRIAMCINOLONE ACETONIDE 40 MG/ML
40 INJECTION, SUSPENSION INTRA-ARTICULAR; INTRAMUSCULAR
Status: COMPLETED | OUTPATIENT
Start: 2025-05-06 | End: 2025-05-06

## 2025-05-06 RX ORDER — LIDOCAINE HYDROCHLORIDE 10 MG/ML
5 INJECTION, SOLUTION INFILTRATION; PERINEURAL
Status: COMPLETED | OUTPATIENT
Start: 2025-05-06 | End: 2025-05-06

## 2025-05-06 RX ADMIN — TRIAMCINOLONE ACETONIDE 40 MG: 40 INJECTION, SUSPENSION INTRA-ARTICULAR; INTRAMUSCULAR at 11:13

## 2025-05-06 RX ADMIN — LIDOCAINE HYDROCHLORIDE 5 ML: 10 INJECTION, SOLUTION INFILTRATION; PERINEURAL at 11:13

## 2025-05-06 NOTE — PROGRESS NOTES
"Chief Complaint  Initial Evaluation of the Right Shoulder     Subjective      Angelika Shukla presents to [facility] for initial evaluation of the right shoulder.  She is having pain in the right shoulder She has had pain off and on since August.  She had a MRI back in November showing a tear in the right shoulder.  She has taking it easy on the right shoulder.  She has pain on the lateral aspect of the shoulder down the arm to the elbow.  She was just put on Tramadol and Robaxin.  She takes care of her grand babies and she cannot move her arm well.  She went to the chiropractor from August to December.      Allergies   Allergen Reactions    Hydrocodone Swelling     itching    Cephalosporins Unknown - Low Severity    Erythromycin Hives     As a child      Hydrocodone-Acetaminophen Hives    Semaglutide(0.25 Or 0.5mg-Dos) GI Intolerance     wegovy    Sulfa Antibiotics Unknown - Low Severity        Social History     Socioeconomic History    Marital status:    Tobacco Use    Smoking status: Never    Smokeless tobacco: Never   Vaping Use    Vaping status: Never Used   Substance and Sexual Activity    Alcohol use: Never    Drug use: Never    Sexual activity: Yes     Partners: Male     Birth control/protection: None, Hysterectomy        I reviewed the patient's chief complaint, history of present illness, review of systems, past medical history, surgical history, family history, social history, medications, and allergy list.     Review of Systems     Constitutional: Denies fevers, chills, weight loss  Cardiovascular: Denies chest pain, shortness of breath  Skin: Denies rashes, acute skin changes  Neurologic: Denies headache, loss of consciousness        Vital Signs:   Ht 165.1 cm (65\")   Wt 77.6 kg (171 lb)   BMI 28.46 kg/m²          Physical Exam  General: Alert. No acute distress    Ortho Exam        RIGHT SHOULDER Forward flexion 90 with pain . Abduction 80. External rotation " 45. Internal rotation SI joint. Positive Cross body adduction. Supraspinatus strength 4/5. Infraspinatus Strength 4+/5. Infrared subscap 4+/5. Positive Gilliland. Positive Neer. Negative Apprehension. Negative Lift off. (Negative Obriens. Sensation intact to light touch, median, radial, ulnar nerve. Positive AIN, PIN, ulnar nerve motor. Positive pulses. Positive Impingement signs. Good strength in triceps, biceps, deltoid, wrist extensors and wrist flexors. Tender to palpation to the anterior  aspect of the shoulder to the elbow.         Large Joint Arthrocentesis: R subacromial bursa  Date/Time: 5/6/2025 11:13 AM  Consent given by: patient  Site marked: site marked  Timeout: Immediately prior to procedure a time out was called to verify the correct patient, procedure, equipment, support staff and site/side marked as required   Supporting Documentation  Indications: pain   Procedure Details  Location: shoulder - R subacromial bursa  Needle gauge: 21 G.  Medications administered: 5 mL lidocaine 1 %; 40 mg triamcinolone acetonide 40 MG/ML  Patient tolerance: patient tolerated the procedure well with no immediate complications      This injection documentation was Scribed for Donell Lopez MD by Jamal Levine.  05/06/25   11:13 EDT      Imaging Results (Most Recent)       None             Result Review :     She has a MRI that is scanned elig[employer] media and is here to review.       Assessment and Plan     Diagnoses and all orders for this visit:    1. Right shoulder pain, unspecified chronicity (Primary)    2. Incomplete tear of right rotator cuff, unspecified whether traumatic        Discussed the treatment plan with the patient. I reviewed the MRI results with the patient.     Discussed the risks and benefits of conservative measures. The patient expressed understanding and wished to proceed with a right shoulder steroid injection.  She tolerated the injection well.     Discussed with the patient that due to the steroid  injection given today in the office they may see an increase in blood sugar for a few days. Advised patient to monitor sugar after receiving the injection.     Discussed possibility of a reaction from the injection.  Discussed the possibility that the injection may not completely improve or remove the pain.  Discussed the risk of infection.    HEP exercises given for rotator cuff tear.     Call or return if worsening symptoms.    Follow Up     4-6 weeks to assess pain and ROM in the right shoulder.        Patient was given instructions and counseling regarding her condition or for health maintenance advice. Please see specific information pulled into the AVS if appropriate.     Scribed for Donell Lopez MD by Yenifer Loya MA.  05/06/25   10:41 EDT    I have personally performed the services described in this document as scribed by the above individual and it is both accurate and complete. Donell Lopez MD 05/06/25